# Patient Record
Sex: FEMALE | Race: WHITE | NOT HISPANIC OR LATINO | Employment: PART TIME | URBAN - METROPOLITAN AREA
[De-identification: names, ages, dates, MRNs, and addresses within clinical notes are randomized per-mention and may not be internally consistent; named-entity substitution may affect disease eponyms.]

---

## 2019-11-20 ENCOUNTER — OFFICE VISIT (OUTPATIENT)
Dept: FAMILY MEDICINE CLINIC | Facility: CLINIC | Age: 39
End: 2019-11-20

## 2019-11-20 VITALS
HEIGHT: 66 IN | WEIGHT: 145 LBS | DIASTOLIC BLOOD PRESSURE: 82 MMHG | SYSTOLIC BLOOD PRESSURE: 122 MMHG | BODY MASS INDEX: 23.3 KG/M2 | HEART RATE: 88 BPM | OXYGEN SATURATION: 98 % | TEMPERATURE: 98.1 F

## 2019-11-20 DIAGNOSIS — Z87.19 H/O DIVERTICULITIS OF COLON: ICD-10-CM

## 2019-11-20 DIAGNOSIS — Z79.899 HIGH RISK MEDICATION USE: ICD-10-CM

## 2019-11-20 DIAGNOSIS — F33.42 RECURRENT MAJOR DEPRESSIVE DISORDER, IN FULL REMISSION (HCC): Primary | ICD-10-CM

## 2019-11-20 DIAGNOSIS — S46.911A STRAIN OF RIGHT SHOULDER, INITIAL ENCOUNTER: ICD-10-CM

## 2019-11-20 DIAGNOSIS — K21.9 GASTROESOPHAGEAL REFLUX DISEASE WITHOUT ESOPHAGITIS: ICD-10-CM

## 2019-11-20 DIAGNOSIS — Z23 NEED FOR IMMUNIZATION AGAINST INFLUENZA: ICD-10-CM

## 2019-11-20 DIAGNOSIS — E78.5 DYSLIPIDEMIA: ICD-10-CM

## 2019-11-20 PROBLEM — F32.A DEPRESSION: Status: ACTIVE | Noted: 2019-11-20

## 2019-11-20 LAB
CHOLEST SERPL-MCNC: 216 MG/DL (ref 0–200)
HDLC SERPL-MCNC: 59 MG/DL (ref 40–60)
LDLC SERPL CALC-MCNC: 134 MG/DL (ref 0–100)
TRIGL SERPL-MCNC: 117 MG/DL (ref 0–150)
VIT B12 SERPL-MCNC: 567 PG/ML (ref 211–946)
VLDLC SERPL CALC-MCNC: 23.4 MG/DL

## 2019-11-20 PROCEDURE — 90471 IMMUNIZATION ADMIN: CPT | Performed by: FAMILY MEDICINE

## 2019-11-20 PROCEDURE — 99214 OFFICE O/P EST MOD 30 MIN: CPT | Performed by: FAMILY MEDICINE

## 2019-11-20 PROCEDURE — 90686 IIV4 VACC NO PRSV 0.5 ML IM: CPT | Performed by: FAMILY MEDICINE

## 2019-11-20 RX ORDER — CYCLOBENZAPRINE HCL 10 MG
10 TABLET ORAL 3 TIMES DAILY PRN
Qty: 90 TABLET | Refills: 1 | Status: SHIPPED | OUTPATIENT
Start: 2019-11-20 | End: 2020-03-23 | Stop reason: SDUPTHER

## 2019-11-20 RX ORDER — ESCITALOPRAM OXALATE 20 MG/1
20 TABLET ORAL DAILY
Qty: 90 TABLET | Refills: 3 | Status: SHIPPED | OUTPATIENT
Start: 2019-11-20 | End: 2021-01-08 | Stop reason: SDUPTHER

## 2019-11-20 RX ORDER — HYOSCYAMINE SULFATE 0.125 MG
TABLET,DISINTEGRATING ORAL 3 TIMES DAILY PRN
COMMUNITY
End: 2020-08-16

## 2019-11-20 RX ORDER — BUPROPION HYDROCHLORIDE 300 MG/1
300 TABLET ORAL EVERY MORNING
Qty: 90 TABLET | Refills: 3 | Status: SHIPPED | OUTPATIENT
Start: 2019-11-20 | End: 2020-11-09 | Stop reason: SDUPTHER

## 2019-11-20 RX ORDER — NAPROXEN 500 MG/1
500 TABLET ORAL
COMMUNITY
End: 2020-08-16

## 2019-11-20 NOTE — PROGRESS NOTES
Subjective   Marleny Palma is a 39 y.o. female.     Chief Complaint   Patient presents with   • Pain     back pain and right shoulder pain        Patient here to establish in the new office.  Her old records are not available today.  She is doing well on her Lexapro and Wellbutrin for her depression.  She was initially started on that during the postpartum period.  She tried to go off it at one time and did not do well.  She prefers to stay on it.  She denies any suicidal homicidal ideation.  She also has long-standing reflux that requires daily PPI usage.  She has had a couple episodes of diverticulitis that has not been further evaluated either.  We discussed her seeing a gastrologist for appropriate work-up.  She has a new complaint today of some right medial shoulder pain.  She does a lot of lifting at work and thinks she is overused it.  She has full range of motion.  The pain does wax and wane and naproxen helps a little bit.           The following portions of the patient's history were reviewed and updated as appropriate: allergies, current medications, past family history, past medical history, past social history, past surgical history and problem list.    Past Medical History:   Diagnosis Date   • Abnormal menstrual cycle    • Acid reflux    • Allergic rhinitis    • Depression    • Fatigue    • Headache    • Hiatal hernia    • Hidradenitis axillaris        Past Surgical History:   Procedure Laterality Date   • AXILLARY HIDRADENITIS EXCISION  11/16/2011   • HYSTERECTOMY     • INCISION AND DRAINAGE ABSCESS  09/04/2013    axilla   • TONSILLECTOMY         Family History   Problem Relation Age of Onset   • Colon polyps Father    • Hypertension Father    • Heart defect Other    • Stroke Other    • Diabetes Other    • Hypertension Other        Social History     Socioeconomic History   • Marital status:      Spouse name: Not on file   • Number of children: Not on file   • Years of education: Not on file   •  "Highest education level: Not on file   Tobacco Use   • Smoking status: Never Smoker   • Smokeless tobacco: Never Used   Substance and Sexual Activity   • Alcohol use: No         Current Outpatient Medications:   •  acetaminophen (TYLENOL) 325 MG tablet, Take 650 mg by mouth., Disp: , Rfl:   •  buPROPion XL (WELLBUTRIN XL) 300 MG 24 hr tablet, Take 1 tablet by mouth Every Morning., Disp: 90 tablet, Rfl: 3  •  cyclobenzaprine (FLEXERIL) 10 MG tablet, Take 1 tablet by mouth 3 (Three) Times a Day As Needed for Muscle Spasms., Disp: 90 tablet, Rfl: 1  •  escitalopram (LEXAPRO) 20 MG tablet, Take 1 tablet by mouth Daily., Disp: 90 tablet, Rfl: 3  •  hyoscyamine sulfate (ANASPAZ) 0.125 MG tablet dispersible disintegrating tablet, Take  by mouth 3 (Three) Times a Day As Needed., Disp: , Rfl:   •  naproxen (NAPROSYN) 500 MG tablet, Take 500 mg by mouth., Disp: , Rfl:   •  pantoprazole (PROTONIX) 40 MG EC tablet, Take 40 mg by mouth daily., Disp: , Rfl:     Review of Systems   Constitutional: Negative for chills, fatigue and fever.   HENT: Negative for congestion, rhinorrhea and sore throat.    Respiratory: Negative for cough and shortness of breath.    Cardiovascular: Negative for chest pain and leg swelling.   Gastrointestinal: Negative for abdominal pain.   Endocrine: Negative for polydipsia and polyuria.   Genitourinary: Negative for dysuria.   Musculoskeletal: Positive for arthralgias. Negative for myalgias.   Skin: Negative for rash.   Neurological: Negative for dizziness and numbness.   Hematological: Does not bruise/bleed easily.   Psychiatric/Behavioral: Negative for sleep disturbance.       Objective   Vitals:    11/20/19 0840   BP: 122/82   Pulse: 88   Temp: 98.1 °F (36.7 °C)   SpO2: 98%   Weight: 65.8 kg (145 lb)   Height: 167.6 cm (66\")     Body mass index is 23.4 kg/m².  Physical Exam   Constitutional: She is oriented to person, place, and time. She appears well-developed and well-nourished.   HENT:   Head: " Normocephalic and atraumatic.   Eyes: Conjunctivae and EOM are normal. Pupils are equal, round, and reactive to light.   Neck: Neck supple. No thyromegaly present.   Cardiovascular: Normal rate and regular rhythm.   No murmur heard.  Pulmonary/Chest: Effort normal and breath sounds normal. She has no wheezes.   Abdominal: Soft.   Musculoskeletal: Normal range of motion.   Tender to palpation medial right trapezius muscle down into the latissimus muscle.  There with good strength and normal range of motion.   Neurological: She is alert and oriented to person, place, and time. No cranial nerve deficit.   Skin: Skin is warm and dry.   Psychiatric: She has a normal mood and affect.         Assessment/Plan   Marleny was seen today for pain.    Diagnoses and all orders for this visit:    Recurrent major depressive disorder, in full remission (CMS/[facility])  -     buPROPion XL (WELLBUTRIN XL) 300 MG 24 hr tablet; Take 1 tablet by mouth Every Morning.  -     escitalopram (LEXAPRO) 20 MG tablet; Take 1 tablet by mouth Daily.    Gastroesophageal reflux disease without esophagitis  -     Ambulatory Referral to Gastroenterology    H/O diverticulitis of colon  -     Ambulatory Referral to Gastroenterology    Strain of right shoulder, initial encounter  -     cyclobenzaprine (FLEXERIL) 10 MG tablet; Take 1 tablet by mouth 3 (Three) Times a Day As Needed for Muscle Spasms.    Dyslipidemia  -     Lipid Panel    High risk medication use  -     Vitamin B12    Need for immunization against influenza  -     Fluarix/Fluzone/Afluria Quad>6 Months               Patient Instructions   Try some topicals to right shoulder.  Let me know if not better soon and can get you set up with physical therapy.       Shoulder Sprain    A shoulder sprain is a partial or complete tear in one of the tough, fiber-like tissues (ligaments) in the shoulder. The ligaments in the shoulder help to hold the shoulder in place.  What are the causes?  This condition may be  caused by:  · A fall.  · A hit to the shoulder.  · A twist of the arm.  What increases the risk?  You are more likely to develop this condition if you:  · Play sports.  · Have problems with balance or coordination.  What are the signs or symptoms?  Symptoms of this condition include:  · Pain when moving the shoulder.  · Limited ability to move the shoulder.  · Swelling and tenderness on top of the shoulder.  · Warmth in the shoulder.  · A change in the shape of the shoulder.  · Redness or bruising on the shoulder.  How is this diagnosed?  This condition is diagnosed with:  · A physical exam. During the exam, you may be asked to do simple exercises with your shoulder.  · Imaging tests such as X-rays, MRI, or a CT scan. These tests can show how severe the sprain is.  How is this treated?  This condition may be treated with:  · Rest.  · Pain medicine.  · Ice.  · A sling or brace. This is used to keep the arm still while the shoulder is healing.  · Physical therapy or rehabilitation exercises. These help to improve the range of motion and strength of the shoulder.  · Surgery (rare). Surgery may be needed if the sprain caused a joint to become unstable. Surgery may also be needed to reduce pain.  Some people may develop ongoing shoulder pain or lose some range of motion in the shoulder. However, most people do not develop long-term problems.  Follow these instructions at home:    If you have a sling or brace:  · Wear the sling or brace as told by your health care provider. Remove it only as told by your health care provider.  · Loosen the sling or brace if your fingers tingle, become numb, or turn cold and blue.  · Keep the sling or brace clean.  · If the sling or brace is not waterproof:  ? Do not let it get wet.  ? Cover it with a watertight covering when you take a bath or shower.  Activity  · Rest your shoulder.  · Move your arm only as much as told by your health care provider, but move your hand and fingers often  to prevent stiffness and swelling.  · Return to your normal activities as told by your health care provider. Ask your health care provider what activities are safe for you.  · Ask your health care provider when it is safe for you to drive if you have a sling or brace on your shoulder.  · If you were shown how to do any exercises, do them as told by your health care provider.  General instructions  · If directed, put ice on the affected area.  ? Put ice in a plastic bag.  ? Place a towel between your skin and the bag.  ? Leave the ice on for 20 minutes, 2-3 times a day.  · Take over-the-counter and prescription medicines only as told by your health care provider.  · Do not use any products that contain nicotine or tobacco, such as cigarettes, e-cigarettes, and chewing tobacco. These can delay healing. If you need help quitting, ask your health care provider.  · Keep all follow-up visits as told by your health care provider. This is important.  Contact a health care provider if:  · Your pain gets worse.  · Your pain is not relieved with medicines.  · You have increased redness or swelling.  Get help right away if:  · You have a fever.  · You cannot move your arm or shoulder.  · You develop severe numbness or tingling in your arm, hand, or fingers.  · Your arm, hand, or fingers feel cold and turn blue, white, or gray.  Summary  · A shoulder sprain is a partial or complete tear in one of the tough, fiber-like tissues (ligaments) in the shoulder.  · This condition may be caused by a fall, a hit to the shoulder, or a twist of the arm.  · Treatment usually includes rest, ice, and pain medicine as needed.  · If you have a sling or brace, wear it as told by your health care provider. Remove it only as told by your health care provider.  This information is not intended to replace advice given to you by your health care provider. Make sure you discuss any questions you have with your health care provider.  Document Released:  05/06/2010 Document Revised: 05/24/2019 Document Reviewed: 05/24/2019  [employer] Patient Education © 2019 [employer]      Shoulder Exercises  Ask your health care provider which exercises are safe for you. Do exercises exactly as told by your health care provider and adjust them as directed. It is normal to feel mild stretching, pulling, tightness, or discomfort as you do these exercises, but you should stop right away if you feel sudden pain or your pain gets worse. Do not begin these exercises until told by your health care provider.  Range of Motion Exercises              These exercises warm up your muscles and joints and improve the movement and flexibility of your shoulder. These exercises also help to relieve pain, numbness, and tingling. These exercises involve stretching your injured shoulder directly.  Exercise A: Pendulum  1. Stand near a wall or a surface that you can hold onto for balance.  2. Bend at the waist and let your left / right arm hang straight down. Use your other arm to support you. Keep your back straight and do not lock your knees.  3. Relax your left / right arm and shoulder muscles, and move your hips and your trunk so your left / right arm swings freely. Your arm should swing because of the motion of your body, not because you are using your arm or shoulder muscles.  4. Keep moving your body so your arm swings in the following directions, as told by your health care provider:  ? Side to side.  ? Forward and backward.  ? In clockwise and counterclockwise circles.  5. Continue each motion for __________ seconds, or for as long as told by your health care provider.  6. Slowly return to the starting position.  Repeat __________ times. Complete this exercise __________ times a day.  Exercise B: Flexion, Standing  1. Stand and hold a broomstick, a cane, or a similar object. Place your hands a little more than shoulder-width apart on the object. Your left / right hand should be  palm-up, and your other hand should be palm-down.  2. Keep your elbow straight and keep your shoulder muscles relaxed. Push the stick down with your healthy arm to raise your left / right arm in front of your body, and then over your head until you feel a stretch in your shoulder.  ? Avoid shrugging your shoulder while you raise your arm. Keep your shoulder blade tucked down toward the middle of your back.  3. Hold for __________ seconds.  4. Slowly return to the starting position.  Repeat __________ times. Complete this exercise __________ times a day.  Exercise C: Abduction, Standing  1. Stand and hold a broomstick, a cane, or a similar object. Place your hands a little more than shoulder-width apart on the object. Your left / right hand should be palm-up, and your other hand should be palm-down.  2. While keeping your elbow straight and your shoulder muscles relaxed, push the stick across your body toward your left / right side. Raise your left / right arm to the side of your body and then over your head until you feel a stretch in your shoulder.  ? Do not raise your arm above shoulder height, unless your health care provider tells you to do that.  ? Avoid shrugging your shoulder while you raise your arm. Keep your shoulder blade tucked down toward the middle of your back.  3. Hold for __________ seconds.  4. Slowly return to the starting position.  Repeat __________ times. Complete this exercise __________ times a day.  Exercise D: Internal Rotation  1. Place your left / right hand behind your back, palm-up.  2. Use your other hand to dangle an exercise band, a towel, or a similar object over your shoulder. Grasp the band with your left / right hand so you are holding onto both ends.  3. Gently pull up on the band until you feel a stretch in the front of your left / right shoulder.  ? Avoid shrugging your shoulder while you raise your arm. Keep your shoulder blade tucked down toward the middle of your  back.  4. Hold for __________ seconds.  5. Release the stretch by letting go of the band and lowering your hands.  Repeat __________ times. Complete this exercise __________ times a day.  Stretching Exercises    These exercises warm up your muscles and joints and improve the movement and flexibility of your shoulder. These exercises also help to relieve pain, numbness, and tingling. These exercises are done using your healthy shoulder to help stretch the muscles of your injured shoulder.  Exercise E: Corner Stretch (External Rotation and Abduction)  1.  a doorway with one of your feet slightly in front of the other. This is called a staggered stance. If you cannot reach your forearms to the door frame, stand facing a corner of a room.  2. Choose one of the following positions as told by your health care provider:  ? Place your hands and forearms on the door frame above your head.  ? Place your hands and forearms on the door frame at the height of your head.  ? Place your hands on the door frame at the height of your elbows.  3. Slowly move your weight onto your front foot until you feel a stretch across your chest and in the front of your shoulders. Keep your head and chest upright and keep your abdominal muscles tight.  4. Hold for __________ seconds.  5. To release the stretch, shift your weight to your back foot.  Repeat __________ times. Complete this stretch __________ times a day.  Exercise F: Extension, Standing  1. Stand and hold a broomstick, a cane, or a similar object behind your back.  ? Your hands should be a little wider than shoulder-width apart.  ? Your palms should face away from your back.  2. Keeping your elbows straight and keeping your shoulder muscles relaxed, move the stick away from your body until you feel a stretch in your shoulder.  ? Avoid shrugging your shoulders while you move the stick. Keep your shoulder blade tucked down toward the middle of your back.  3. Hold for __________  seconds.  4. Slowly return to the starting position.  Repeat __________ times. Complete this exercise __________ times a day.  Strengthening Exercises                   These exercises build strength and endurance in your shoulder. Endurance is the ability to use your muscles for a long time, even after they get tired.  Exercise G: External Rotation  1. Sit in a stable chair without armrests.  2. Secure an exercise band at elbow height on your left / right side.  3. Place a soft object, such as a folded towel or a small pillow, between your left / right upper arm and your body to move your elbow a few inches away (about 10 cm) from your side.  4. Hold the end of the band so it is tight and there is no slack.  5. Keeping your elbow pressed against the soft object, move your left / right forearm out, away from your abdomen. Keep your body steady so only your forearm moves.  6. Hold for __________ seconds.  7. Slowly return to the starting position.  Repeat __________ times. Complete this exercise __________ times a day.  Exercise H: Shoulder Abduction  1. Sit in a stable chair without armrests, or stand.  2. Hold a __________ weight in your left / right hand, or hold an exercise band with both hands.  3. Start with your arms straight down and your left / right palm facing in, toward your body.  4. Slowly lift your left / right hand out to your side. Do not lift your hand above shoulder height unless your health care provider tells you that this is safe.  ? Keep your arms straight.  ? Avoid shrugging your shoulder while you do this movement. Keep your shoulder blade tucked down toward the middle of your back.  5. Hold for __________ seconds.  6. Slowly lower your arm, and return to the starting position.  Repeat __________ times. Complete this exercise __________ times a day.  Exercise I: Shoulder Extension  1. Sit in a stable chair without armrests, or stand.  2. Secure an exercise band to a stable object in front of  "you where it is at shoulder height.  3. Hold one end of the exercise band in each hand. Your palms should face each other.  4. Straighten your elbows and lift your hands up to shoulder height.  5. Step back, away from the secured end of the exercise band, until the band is tight and there is no slack.  6. Squeeze your shoulder blades together as you pull your hands down to the sides of your thighs. Stop when your hands are straight down by your sides. Do not let your hands go behind your body.  7. Hold for __________ seconds.  8. Slowly return to the starting position.  Repeat __________ times. Complete this exercise __________ times a day.  Exercise J: Standing Shoulder Row  1. Sit in a stable chair without armrests, or stand.  2. Secure an exercise band to a stable object in front of you so it is at waist height.  3. Hold one end of the exercise band in each hand. Your palms should be in a thumbs-up position.  4. Bend each of your elbows to an \"L\" shape (about 90 degrees) and keep your upper arms at your sides.  5. Step back until the band is tight and there is no slack.  6. Slowly pull your elbows back behind you.  7. Hold for __________ seconds.  8. Slowly return to the starting position.  Repeat __________ times. Complete this exercise __________ times a day.  Exercise K: Shoulder Press-Ups  1. Sit in a stable chair that has armrests. Sit upright, with your feet flat on the floor.  2. Put your hands on the armrests so your elbows are bent and your fingers are pointing forward. Your hands should be about even with the sides of your body.  3. Push down on the armrests and use your arms to lift yourself off of the chair. Straighten your elbows and lift yourself up as much as you comfortably can.  ? Move your shoulder blades down, and avoid letting your shoulders move up toward your ears.  ? Keep your feet on the ground. As you get stronger, your feet should support less of your body weight as you lift yourself " up.  4. Hold for __________ seconds.  5. Slowly lower yourself back into the chair.  Repeat __________ times. Complete this exercise __________ times a day.  Exercise L: Wall Push-Ups  1. Stand so you are facing a stable wall. Your feet should be about one arm-length away from the wall.  2. Lean forward and place your palms on the wall at shoulder height.  3. Keep your feet flat on the floor as you bend your elbows and lean forward toward the wall.  4. Hold for __________ seconds.  5. Straighten your elbows to push yourself back to the starting position.  Repeat __________ times. Complete this exercise __________ times a day.  This information is not intended to replace advice given to you by your health care provider. Make sure you discuss any questions you have with your health care provider.  Document Released: 11/01/2006 Document Revised: 04/23/2019 Document Reviewed: 08/28/2016  [employer] Interactive Patient Education © 2019 [employer]

## 2019-11-20 NOTE — PATIENT INSTRUCTIONS
Try some topicals to right shoulder.  Let me know if not better soon and can get you set up with physical therapy.       Shoulder Sprain    A shoulder sprain is a partial or complete tear in one of the tough, fiber-like tissues (ligaments) in the shoulder. The ligaments in the shoulder help to hold the shoulder in place.  What are the causes?  This condition may be caused by:  · A fall.  · A hit to the shoulder.  · A twist of the arm.  What increases the risk?  You are more likely to develop this condition if you:  · Play sports.  · Have problems with balance or coordination.  What are the signs or symptoms?  Symptoms of this condition include:  · Pain when moving the shoulder.  · Limited ability to move the shoulder.  · Swelling and tenderness on top of the shoulder.  · Warmth in the shoulder.  · A change in the shape of the shoulder.  · Redness or bruising on the shoulder.  How is this diagnosed?  This condition is diagnosed with:  · A physical exam. During the exam, you may be asked to do simple exercises with your shoulder.  · Imaging tests such as X-rays, MRI, or a CT scan. These tests can show how severe the sprain is.  How is this treated?  This condition may be treated with:  · Rest.  · Pain medicine.  · Ice.  · A sling or brace. This is used to keep the arm still while the shoulder is healing.  · Physical therapy or rehabilitation exercises. These help to improve the range of motion and strength of the shoulder.  · Surgery (rare). Surgery may be needed if the sprain caused a joint to become unstable. Surgery may also be needed to reduce pain.  Some people may develop ongoing shoulder pain or lose some range of motion in the shoulder. However, most people do not develop long-term problems.  Follow these instructions at home:    If you have a sling or brace:  · Wear the sling or brace as told by your health care provider. Remove it only as told by your health care provider.  · Loosen the sling or brace if  your fingers tingle, become numb, or turn cold and blue.  · Keep the sling or brace clean.  · If the sling or brace is not waterproof:  ? Do not let it get wet.  ? Cover it with a watertight covering when you take a bath or shower.  Activity  · Rest your shoulder.  · Move your arm only as much as told by your health care provider, but move your hand and fingers often to prevent stiffness and swelling.  · Return to your normal activities as told by your health care provider. Ask your health care provider what activities are safe for you.  · Ask your health care provider when it is safe for you to drive if you have a sling or brace on your shoulder.  · If you were shown how to do any exercises, do them as told by your health care provider.  General instructions  · If directed, put ice on the affected area.  ? Put ice in a plastic bag.  ? Place a towel between your skin and the bag.  ? Leave the ice on for 20 minutes, 2-3 times a day.  · Take over-the-counter and prescription medicines only as told by your health care provider.  · Do not use any products that contain nicotine or tobacco, such as cigarettes, e-cigarettes, and chewing tobacco. These can delay healing. If you need help quitting, ask your health care provider.  · Keep all follow-up visits as told by your health care provider. This is important.  Contact a health care provider if:  · Your pain gets worse.  · Your pain is not relieved with medicines.  · You have increased redness or swelling.  Get help right away if:  · You have a fever.  · You cannot move your arm or shoulder.  · You develop severe numbness or tingling in your arm, hand, or fingers.  · Your arm, hand, or fingers feel cold and turn blue, white, or gray.  Summary  · A shoulder sprain is a partial or complete tear in one of the tough, fiber-like tissues (ligaments) in the shoulder.  · This condition may be caused by a fall, a hit to the shoulder, or a twist of the arm.  · Treatment usually  includes rest, ice, and pain medicine as needed.  · If you have a sling or brace, wear it as told by your health care provider. Remove it only as told by your health care provider.  This information is not intended to replace advice given to you by your health care provider. Make sure you discuss any questions you have with your health care provider.  Document Released: 05/06/2010 Document Revised: 05/24/2019 Document Reviewed: 05/24/2019  "[employer]" Interactive Patient Education © 2019 "[employer] Inc[employer]      Shoulder Exercises  Ask your health care provider which exercises are safe for you. Do exercises exactly as told by your health care provider and adjust them as directed. It is normal to feel mild stretching, pulling, tightness, or discomfort as you do these exercises, but you should stop right away if you feel sudden pain or your pain gets worse. Do not begin these exercises until told by your health care provider.  Range of Motion Exercises              These exercises warm up your muscles and joints and improve the movement and flexibility of your shoulder. These exercises also help to relieve pain, numbness, and tingling. These exercises involve stretching your injured shoulder directly.  Exercise A: Pendulum  1. Stand near a wall or a surface that you can hold onto for balance.  2. Bend at the waist and let your left / right arm hang straight down. Use your other arm to support you. Keep your back straight and do not lock your knees.  3. Relax your left / right arm and shoulder muscles, and move your hips and your trunk so your left / right arm swings freely. Your arm should swing because of the motion of your body, not because you are using your arm or shoulder muscles.  4. Keep moving your body so your arm swings in the following directions, as told by your health care provider:  ? Side to side.  ? Forward and backward.  ? In clockwise and counterclockwise circles.  5. Continue each motion for __________  seconds, or for as long as told by your health care provider.  6. Slowly return to the starting position.  Repeat __________ times. Complete this exercise __________ times a day.  Exercise B: Flexion, Standing  1. Stand and hold a broomstick, a cane, or a similar object. Place your hands a little more than shoulder-width apart on the object. Your left / right hand should be palm-up, and your other hand should be palm-down.  2. Keep your elbow straight and keep your shoulder muscles relaxed. Push the stick down with your healthy arm to raise your left / right arm in front of your body, and then over your head until you feel a stretch in your shoulder.  ? Avoid shrugging your shoulder while you raise your arm. Keep your shoulder blade tucked down toward the middle of your back.  3. Hold for __________ seconds.  4. Slowly return to the starting position.  Repeat __________ times. Complete this exercise __________ times a day.  Exercise C: Abduction, Standing  1. Stand and hold a broomstick, a cane, or a similar object. Place your hands a little more than shoulder-width apart on the object. Your left / right hand should be palm-up, and your other hand should be palm-down.  2. While keeping your elbow straight and your shoulder muscles relaxed, push the stick across your body toward your left / right side. Raise your left / right arm to the side of your body and then over your head until you feel a stretch in your shoulder.  ? Do not raise your arm above shoulder height, unless your health care provider tells you to do that.  ? Avoid shrugging your shoulder while you raise your arm. Keep your shoulder blade tucked down toward the middle of your back.  3. Hold for __________ seconds.  4. Slowly return to the starting position.  Repeat __________ times. Complete this exercise __________ times a day.  Exercise D: Internal Rotation  1. Place your left / right hand behind your back, palm-up.  2. Use your other hand to dangle  an exercise band, a towel, or a similar object over your shoulder. Grasp the band with your left / right hand so you are holding onto both ends.  3. Gently pull up on the band until you feel a stretch in the front of your left / right shoulder.  ? Avoid shrugging your shoulder while you raise your arm. Keep your shoulder blade tucked down toward the middle of your back.  4. Hold for __________ seconds.  5. Release the stretch by letting go of the band and lowering your hands.  Repeat __________ times. Complete this exercise __________ times a day.  Stretching Exercises    These exercises warm up your muscles and joints and improve the movement and flexibility of your shoulder. These exercises also help to relieve pain, numbness, and tingling. These exercises are done using your healthy shoulder to help stretch the muscles of your injured shoulder.  Exercise E: Corner Stretch (External Rotation and Abduction)  1.  a doorway with one of your feet slightly in front of the other. This is called a staggered stance. If you cannot reach your forearms to the door frame, stand facing a corner of a room.  2. Choose one of the following positions as told by your health care provider:  ? Place your hands and forearms on the door frame above your head.  ? Place your hands and forearms on the door frame at the height of your head.  ? Place your hands on the door frame at the height of your elbows.  3. Slowly move your weight onto your front foot until you feel a stretch across your chest and in the front of your shoulders. Keep your head and chest upright and keep your abdominal muscles tight.  4. Hold for __________ seconds.  5. To release the stretch, shift your weight to your back foot.  Repeat __________ times. Complete this stretch __________ times a day.  Exercise F: Extension, Standing  1. Stand and hold a broomstick, a cane, or a similar object behind your back.  ? Your hands should be a little wider than  shoulder-width apart.  ? Your palms should face away from your back.  2. Keeping your elbows straight and keeping your shoulder muscles relaxed, move the stick away from your body until you feel a stretch in your shoulder.  ? Avoid shrugging your shoulders while you move the stick. Keep your shoulder blade tucked down toward the middle of your back.  3. Hold for __________ seconds.  4. Slowly return to the starting position.  Repeat __________ times. Complete this exercise __________ times a day.  Strengthening Exercises                   These exercises build strength and endurance in your shoulder. Endurance is the ability to use your muscles for a long time, even after they get tired.  Exercise G: External Rotation  1. Sit in a stable chair without armrests.  2. Secure an exercise band at elbow height on your left / right side.  3. Place a soft object, such as a folded towel or a small pillow, between your left / right upper arm and your body to move your elbow a few inches away (about 10 cm) from your side.  4. Hold the end of the band so it is tight and there is no slack.  5. Keeping your elbow pressed against the soft object, move your left / right forearm out, away from your abdomen. Keep your body steady so only your forearm moves.  6. Hold for __________ seconds.  7. Slowly return to the starting position.  Repeat __________ times. Complete this exercise __________ times a day.  Exercise H: Shoulder Abduction  1. Sit in a stable chair without armrests, or stand.  2. Hold a __________ weight in your left / right hand, or hold an exercise band with both hands.  3. Start with your arms straight down and your left / right palm facing in, toward your body.  4. Slowly lift your left / right hand out to your side. Do not lift your hand above shoulder height unless your health care provider tells you that this is safe.  ? Keep your arms straight.  ? Avoid shrugging your shoulder while you do this movement. Keep  "your shoulder blade tucked down toward the middle of your back.  5. Hold for __________ seconds.  6. Slowly lower your arm, and return to the starting position.  Repeat __________ times. Complete this exercise __________ times a day.  Exercise I: Shoulder Extension  1. Sit in a stable chair without armrests, or stand.  2. Secure an exercise band to a stable object in front of you where it is at shoulder height.  3. Hold one end of the exercise band in each hand. Your palms should face each other.  4. Straighten your elbows and lift your hands up to shoulder height.  5. Step back, away from the secured end of the exercise band, until the band is tight and there is no slack.  6. Squeeze your shoulder blades together as you pull your hands down to the sides of your thighs. Stop when your hands are straight down by your sides. Do not let your hands go behind your body.  7. Hold for __________ seconds.  8. Slowly return to the starting position.  Repeat __________ times. Complete this exercise __________ times a day.  Exercise J: Standing Shoulder Row  1. Sit in a stable chair without armrests, or stand.  2. Secure an exercise band to a stable object in front of you so it is at waist height.  3. Hold one end of the exercise band in each hand. Your palms should be in a thumbs-up position.  4. Bend each of your elbows to an \"L\" shape (about 90 degrees) and keep your upper arms at your sides.  5. Step back until the band is tight and there is no slack.  6. Slowly pull your elbows back behind you.  7. Hold for __________ seconds.  8. Slowly return to the starting position.  Repeat __________ times. Complete this exercise __________ times a day.  Exercise K: Shoulder Press-Ups  1. Sit in a stable chair that has armrests. Sit upright, with your feet flat on the floor.  2. Put your hands on the armrests so your elbows are bent and your fingers are pointing forward. Your hands should be about even with the sides of your " body.  3. Push down on the armrests and use your arms to lift yourself off of the chair. Straighten your elbows and lift yourself up as much as you comfortably can.  ? Move your shoulder blades down, and avoid letting your shoulders move up toward your ears.  ? Keep your feet on the ground. As you get stronger, your feet should support less of your body weight as you lift yourself up.  4. Hold for __________ seconds.  5. Slowly lower yourself back into the chair.  Repeat __________ times. Complete this exercise __________ times a day.  Exercise L: Wall Push-Ups  1. Stand so you are facing a stable wall. Your feet should be about one arm-length away from the wall.  2. Lean forward and place your palms on the wall at shoulder height.  3. Keep your feet flat on the floor as you bend your elbows and lean forward toward the wall.  4. Hold for __________ seconds.  5. Straighten your elbows to push yourself back to the starting position.  Repeat __________ times. Complete this exercise __________ times a day.  This information is not intended to replace advice given to you by your health care provider. Make sure you discuss any questions you have with your health care provider.  Document Released: 11/01/2006 Document Revised: 04/23/2019 Document Reviewed: 08/28/2016  [employer] Interactive Patient Education © 2019 [employer]

## 2020-01-06 RX ORDER — PANTOPRAZOLE SODIUM 40 MG/1
40 TABLET, DELAYED RELEASE ORAL DAILY
Qty: 90 TABLET | Refills: 0 | Status: SHIPPED | OUTPATIENT
Start: 2020-01-06 | End: 2020-05-05

## 2020-03-23 DIAGNOSIS — S46.911A STRAIN OF RIGHT SHOULDER, INITIAL ENCOUNTER: ICD-10-CM

## 2020-03-23 RX ORDER — CYCLOBENZAPRINE HCL 10 MG
10 TABLET ORAL 3 TIMES DAILY PRN
Qty: 90 TABLET | Refills: 2 | Status: SHIPPED | OUTPATIENT
Start: 2020-03-23 | End: 2020-09-22 | Stop reason: SDUPTHER

## 2020-05-05 RX ORDER — PANTOPRAZOLE SODIUM 40 MG/1
40 TABLET, DELAYED RELEASE ORAL DAILY
Qty: 90 TABLET | Refills: 0 | Status: SHIPPED | OUTPATIENT
Start: 2020-05-05 | End: 2020-08-17

## 2020-08-17 RX ORDER — PANTOPRAZOLE SODIUM 40 MG/1
40 TABLET, DELAYED RELEASE ORAL DAILY
Qty: 90 TABLET | Refills: 0 | Status: SHIPPED | OUTPATIENT
Start: 2020-08-17 | End: 2020-11-09

## 2020-09-22 DIAGNOSIS — S46.911A STRAIN OF RIGHT SHOULDER, INITIAL ENCOUNTER: ICD-10-CM

## 2020-09-23 RX ORDER — CYCLOBENZAPRINE HCL 10 MG
TABLET ORAL
Qty: 90 TABLET | Refills: 2 | OUTPATIENT
Start: 2020-09-23

## 2020-09-23 RX ORDER — CYCLOBENZAPRINE HCL 10 MG
10 TABLET ORAL 3 TIMES DAILY PRN
Qty: 90 TABLET | Refills: 2 | Status: SHIPPED | OUTPATIENT
Start: 2020-09-23 | End: 2021-03-09

## 2020-11-08 DIAGNOSIS — F33.42 RECURRENT MAJOR DEPRESSIVE DISORDER, IN FULL REMISSION (HCC): ICD-10-CM

## 2020-11-09 DIAGNOSIS — F33.42 RECURRENT MAJOR DEPRESSIVE DISORDER, IN FULL REMISSION (HCC): ICD-10-CM

## 2020-11-09 RX ORDER — PANTOPRAZOLE SODIUM 40 MG/1
40 TABLET, DELAYED RELEASE ORAL DAILY
Qty: 90 TABLET | Refills: 0 | Status: SHIPPED | OUTPATIENT
Start: 2020-11-09 | End: 2021-02-03

## 2020-11-09 RX ORDER — BUPROPION HYDROCHLORIDE 300 MG/1
300 TABLET ORAL EVERY MORNING
Qty: 30 TABLET | Refills: 0 | Status: SHIPPED | OUTPATIENT
Start: 2020-11-09 | End: 2020-12-10

## 2020-11-09 RX ORDER — BUPROPION HYDROCHLORIDE 300 MG/1
300 TABLET ORAL EVERY MORNING
Qty: 90 TABLET | Refills: 3 | OUTPATIENT
Start: 2020-11-09

## 2020-11-09 RX ORDER — PANTOPRAZOLE SODIUM 40 MG/1
40 TABLET, DELAYED RELEASE ORAL DAILY
Qty: 90 TABLET | Refills: 0 | OUTPATIENT
Start: 2020-11-09

## 2020-12-10 DIAGNOSIS — F33.42 RECURRENT MAJOR DEPRESSIVE DISORDER, IN FULL REMISSION (HCC): ICD-10-CM

## 2020-12-10 RX ORDER — BUPROPION HYDROCHLORIDE 300 MG/1
300 TABLET ORAL EVERY MORNING
Qty: 90 TABLET | Refills: 0 | Status: SHIPPED | OUTPATIENT
Start: 2020-12-10 | End: 2021-01-08 | Stop reason: SDUPTHER

## 2021-01-07 DIAGNOSIS — F33.42 RECURRENT MAJOR DEPRESSIVE DISORDER, IN FULL REMISSION (HCC): ICD-10-CM

## 2021-01-07 RX ORDER — ESCITALOPRAM OXALATE 20 MG/1
20 TABLET ORAL DAILY
Qty: 90 TABLET | Refills: 3 | OUTPATIENT
Start: 2021-01-07

## 2021-01-08 DIAGNOSIS — F33.42 RECURRENT MAJOR DEPRESSIVE DISORDER, IN FULL REMISSION (HCC): ICD-10-CM

## 2021-01-08 RX ORDER — BUPROPION HYDROCHLORIDE 300 MG/1
300 TABLET ORAL EVERY MORNING
Qty: 90 TABLET | Refills: 0 | OUTPATIENT
Start: 2021-01-08

## 2021-01-08 RX ORDER — ESCITALOPRAM OXALATE 20 MG/1
20 TABLET ORAL DAILY
Qty: 90 TABLET | Refills: 3 | OUTPATIENT
Start: 2021-01-08

## 2021-01-08 RX ORDER — ESCITALOPRAM OXALATE 20 MG/1
20 TABLET ORAL DAILY
Qty: 30 TABLET | Refills: 0 | OUTPATIENT
Start: 2021-01-08

## 2021-01-08 RX ORDER — BUPROPION HYDROCHLORIDE 300 MG/1
300 TABLET ORAL EVERY MORNING
Qty: 30 TABLET | Refills: 0 | Status: SHIPPED | OUTPATIENT
Start: 2021-01-08 | End: 2021-02-03 | Stop reason: SDUPTHER

## 2021-01-08 RX ORDER — ESCITALOPRAM OXALATE 20 MG/1
20 TABLET ORAL DAILY
Qty: 30 TABLET | Refills: 0 | Status: SHIPPED | OUTPATIENT
Start: 2021-01-08 | End: 2021-02-03 | Stop reason: SDUPTHER

## 2021-01-08 NOTE — TELEPHONE ENCOUNTER
Caller: Louie Marleny    Relationship: Self    Best call back number:     Medication needed:   Requested Prescriptions     Pending Prescriptions Disp Refills   • buPROPion XL (WELLBUTRIN XL) 300 MG 24 hr tablet 90 tablet 0     Sig: Take 1 tablet by mouth Every Morning.   • escitalopram (LEXAPRO) 20 MG tablet 90 tablet 3     Sig: Take 1 tablet by mouth Daily.       What details did the patient provide when requesting the medication: PT IS SCHEDULED 02/03/2020, SHE IS REQUESTING FOR THESE REFILLS TO HOLD HER OVER TILL THEN. SHE WILL BE OUT SOON.   Does the patient have less than a 3 day supply:  [x] Yes  [] No    What is the patient's preferred pharmacy: [id number] - [facility] [address] - [phone]  - [phone] FX

## 2021-02-03 ENCOUNTER — OFFICE VISIT (OUTPATIENT)
Dept: FAMILY MEDICINE CLINIC | Facility: CLINIC | Age: 41
End: 2021-02-03

## 2021-02-03 VITALS
HEIGHT: 66 IN | BODY MASS INDEX: 23.88 KG/M2 | OXYGEN SATURATION: 99 % | DIASTOLIC BLOOD PRESSURE: 68 MMHG | HEART RATE: 98 BPM | WEIGHT: 148.6 LBS | SYSTOLIC BLOOD PRESSURE: 126 MMHG | TEMPERATURE: 97.7 F

## 2021-02-03 DIAGNOSIS — F33.42 RECURRENT MAJOR DEPRESSIVE DISORDER, IN FULL REMISSION (HCC): Primary | Chronic | ICD-10-CM

## 2021-02-03 DIAGNOSIS — K21.9 GASTROESOPHAGEAL REFLUX DISEASE WITHOUT ESOPHAGITIS: Chronic | ICD-10-CM

## 2021-02-03 DIAGNOSIS — K57.92 DIVERTICULITIS: Chronic | ICD-10-CM

## 2021-02-03 DIAGNOSIS — E78.5 HYPERLIPIDEMIA, UNSPECIFIED HYPERLIPIDEMIA TYPE: ICD-10-CM

## 2021-02-03 PROCEDURE — 99213 OFFICE O/P EST LOW 20 MIN: CPT | Performed by: FAMILY MEDICINE

## 2021-02-03 RX ORDER — ESCITALOPRAM OXALATE 20 MG/1
20 TABLET ORAL DAILY
Qty: 90 TABLET | Refills: 1 | Status: SHIPPED | OUTPATIENT
Start: 2021-02-03 | End: 2021-08-11

## 2021-02-03 RX ORDER — PANTOPRAZOLE SODIUM 40 MG/1
40 TABLET, DELAYED RELEASE ORAL DAILY
Qty: 90 TABLET | Refills: 0 | Status: SHIPPED | OUTPATIENT
Start: 2021-02-03 | End: 2021-11-11

## 2021-02-03 RX ORDER — SACCHAROMYCES BOULARDII 250 MG
250 CAPSULE ORAL 2 TIMES DAILY
COMMUNITY
End: 2021-11-19

## 2021-02-03 RX ORDER — BUPROPION HYDROCHLORIDE 300 MG/1
300 TABLET ORAL EVERY MORNING
Qty: 90 TABLET | Refills: 1 | Status: SHIPPED | OUTPATIENT
Start: 2021-02-03 | End: 2021-08-11 | Stop reason: DRUGHIGH

## 2021-02-03 NOTE — PROGRESS NOTES
"Chief Complaint  Med Refill    Subjective          Marleny Palma presents to [facility] for   Patient presents to follow-up on her depression.  Is been over a year since have seen her.  She still been taking her Lexapro and Wellbutrin every day and feels her depression is very well controlled.  She denies any hopelessness.  She denies any suicidal or homicidal ideation.  She has been compliant with her medication.  She has had another episode of diverticulitis since I last saw her with abscess.  She has a scope with Dr. Chau in March.  She was admitted to [facility] and we did not get the records.  A surgeon saw her while she was there.  She is currently on a PPI as well as MiraLAX.  They want to rule out inflammatory bowel disease because she has had recurrent diverticulitis.  Her father had a partial sigmoidectomy.  She feels pretty good right now without abdominal pain.      Objective   Vital Signs:   /68   Pulse 98   Temp 97.7 °F (36.5 °C)   Ht 167.6 cm (66\")   Wt 67.4 kg (148 lb 9.6 oz)   SpO2 99%   BMI 23.98 kg/m²     Physical Exam  Constitutional:       General: She is not in acute distress.     Appearance: She is well-developed.   HENT:      Head: Normocephalic and atraumatic.   Eyes:      Conjunctiva/sclera: Conjunctivae normal.      Pupils: Pupils are equal, round, and reactive to light.   Pulmonary:      Effort: Pulmonary effort is normal. No respiratory distress.   Neurological:      Mental Status: She is alert and oriented to person, place, and time.        Result Review :                 Assessment and Plan    Problem List Items Addressed This Visit        Gastrointestinal Abdominal     Acid reflux       Mental Health    Depression - Primary    Relevant Medications    escitalopram (LEXAPRO) 20 MG tablet    buPROPion XL (WELLBUTRIN XL) 300 MG 24 hr tablet      Other Visit Diagnoses     Diverticulitis  (Chronic)       Hyperlipidemia, " unspecified hyperlipidemia type              Follow Up   Return in about 6 months (around 8/3/2021) for Annual physical.  Patient was given instructions and counseling regarding her condition or for health maintenance advice. Please see specific information pulled into the AVS if appropriate.

## 2021-03-08 DIAGNOSIS — S46.911A STRAIN OF RIGHT SHOULDER, INITIAL ENCOUNTER: ICD-10-CM

## 2021-03-09 RX ORDER — CYCLOBENZAPRINE HCL 10 MG
TABLET ORAL
Qty: 90 TABLET | Refills: 2 | Status: SHIPPED | OUTPATIENT
Start: 2021-03-09 | End: 2021-08-25

## 2021-04-02 ENCOUNTER — BULK ORDERING (OUTPATIENT)
Dept: CASE MANAGEMENT | Facility: OTHER | Age: 41
End: 2021-04-02

## 2021-04-02 DIAGNOSIS — Z23 IMMUNIZATION DUE: ICD-10-CM

## 2021-04-12 ENCOUNTER — OFFICE VISIT (OUTPATIENT)
Dept: FAMILY MEDICINE CLINIC | Facility: CLINIC | Age: 41
End: 2021-04-12

## 2021-04-12 VITALS
HEART RATE: 105 BPM | BODY MASS INDEX: 23.18 KG/M2 | HEIGHT: 66 IN | OXYGEN SATURATION: 98 % | WEIGHT: 144.2 LBS | DIASTOLIC BLOOD PRESSURE: 72 MMHG | TEMPERATURE: 97.8 F | SYSTOLIC BLOOD PRESSURE: 110 MMHG

## 2021-04-12 DIAGNOSIS — R39.198 DIFFICULTY URINATING: Primary | ICD-10-CM

## 2021-04-12 DIAGNOSIS — N30.90 CYSTITIS: ICD-10-CM

## 2021-04-12 LAB
BILIRUB BLD-MCNC: ABNORMAL MG/DL
CLARITY, POC: CLEAR
COLOR UR: ABNORMAL
GLUCOSE UR STRIP-MCNC: NEGATIVE MG/DL
KETONES UR QL: NEGATIVE
LEUKOCYTE EST, POC: ABNORMAL
NITRITE UR-MCNC: POSITIVE MG/ML
PH UR: 6 [PH] (ref 5–8)
PROT UR STRIP-MCNC: NEGATIVE MG/DL
RBC # UR STRIP: ABNORMAL /UL
SP GR UR: 1.02 (ref 1–1.03)
UROBILINOGEN UR QL: ABNORMAL

## 2021-04-12 PROCEDURE — 99213 OFFICE O/P EST LOW 20 MIN: CPT | Performed by: FAMILY MEDICINE

## 2021-04-12 RX ORDER — SULFAMETHOXAZOLE AND TRIMETHOPRIM 800; 160 MG/1; MG/1
1 TABLET ORAL 2 TIMES DAILY
Qty: 10 TABLET | Refills: 0 | Status: SHIPPED | OUTPATIENT
Start: 2021-04-12 | End: 2021-04-17

## 2021-04-12 NOTE — PROGRESS NOTES
"Chief Complaint  Difficulty Urinating (burning when urinating)    Subjective          Marleny Palma presents to [facility]  Has been having bladder spasms over the weekend.  Has had them off and on since her hysterectomy o=in 2014.  Went ti urgent care in February for bladder spasms and had a negative culture.  Took pyridium untilt it was better.  Has been passing some small blood clots.  Pain is worse at the beginnig of micturation.  No pain in back or sides.  No nausea or vomiting.       Objective   Vital Signs:   /72   Pulse 105   Temp 97.8 °F (36.6 °C)   Ht 167.6 cm (66\")   Wt 65.4 kg (144 lb 3.2 oz)   SpO2 98%   BMI 23.27 kg/m²     Physical Exam  Vitals and nursing note reviewed.   Constitutional:       General: She is not in acute distress.  HENT:      Head: Normocephalic and atraumatic.      Right Ear: External ear normal.      Left Ear: External ear normal.   Eyes:      Conjunctiva/sclera: Conjunctivae normal.      Pupils: Pupils are equal, round, and reactive to light.   Pulmonary:      Effort: No respiratory distress.   Abdominal:      General: There is distension.      Tenderness: There is abdominal tenderness in the suprapubic area. There is no right CVA tenderness, left CVA tenderness, guarding or rebound.   Neurological:      Mental Status: She is alert and oriented to person, place, and time.   Psychiatric:         Mood and Affect: Mood normal.         Behavior: Behavior normal.        Result Review :   The following data was reviewed by: Meredith Lea Kehrer, MD on 04/12/2021:  UA    Urinalysis 4/12/21   Ketones, UA Negative   Leukocytes, UA Moderate (2+) (A)   (A) Abnormal value                      Assessment and Plan    Diagnoses and all orders for this visit:    1. Difficulty urinating (Primary)  -     POC Urinalysis Dipstick, Automated    2. Cystitis  -     sulfamethoxazole-trimethoprim (Bactrim DS) 800-160 MG per tablet; Take 1 tablet by mouth 2 (Two) " Times a Day for 5 days.  Dispense: 10 tablet; Refill: 0  -     Urine Culture - Urine, Urine, Clean Catch    Cystitis-we will do Bactrim await for urine culture since we are unable to totally see UA on dip.    Follow Up   No follow-ups on file.  Patient was given instructions and counseling regarding her condition or for health maintenance advice. Please see specific information pulled into the AVS if appropriate.

## 2021-04-14 LAB
BACTERIA UR CULT: NORMAL
BACTERIA UR CULT: NORMAL

## 2021-08-11 ENCOUNTER — OFFICE VISIT (OUTPATIENT)
Dept: FAMILY MEDICINE CLINIC | Facility: CLINIC | Age: 41
End: 2021-08-11

## 2021-08-11 VITALS
HEART RATE: 76 BPM | BODY MASS INDEX: 23.43 KG/M2 | SYSTOLIC BLOOD PRESSURE: 116 MMHG | HEIGHT: 66 IN | TEMPERATURE: 97.7 F | DIASTOLIC BLOOD PRESSURE: 76 MMHG | OXYGEN SATURATION: 98 % | WEIGHT: 145.8 LBS

## 2021-08-11 DIAGNOSIS — Z00.00 ROUTINE HEALTH MAINTENANCE: Primary | ICD-10-CM

## 2021-08-11 DIAGNOSIS — Z11.59 NEED FOR HEPATITIS C SCREENING TEST: ICD-10-CM

## 2021-08-11 DIAGNOSIS — F33.42 RECURRENT MAJOR DEPRESSIVE DISORDER, IN FULL REMISSION (HCC): ICD-10-CM

## 2021-08-11 DIAGNOSIS — R53.83 OTHER FATIGUE: ICD-10-CM

## 2021-08-11 PROCEDURE — 99396 PREV VISIT EST AGE 40-64: CPT | Performed by: FAMILY MEDICINE

## 2021-08-11 RX ORDER — DESVENLAFAXINE SUCCINATE 50 MG/1
50 TABLET, EXTENDED RELEASE ORAL DAILY
Qty: 30 TABLET | Refills: 1 | Status: SHIPPED | OUTPATIENT
Start: 2021-08-11 | End: 2021-10-01

## 2021-08-11 RX ORDER — POLYETHYLENE GLYCOL 3350 17 G/17G
POWDER, FOR SOLUTION ORAL
COMMUNITY
Start: 2021-07-07 | End: 2022-03-03 | Stop reason: HOSPADM

## 2021-08-11 RX ORDER — BUPROPION HYDROCHLORIDE 150 MG/1
150 TABLET ORAL DAILY
Qty: 30 TABLET | Refills: 1 | Status: SHIPPED | OUTPATIENT
Start: 2021-08-11 | End: 2021-10-20

## 2021-08-11 NOTE — PROGRESS NOTES
Subjective   Marleny Palma is a 41 y.o. female who presents for annual female wellness exam.  Chief Complaint   Patient presents with   • Constipation   • Anxiety   • Med Refill   • Depression   Has been very tired.  Working 13 days straight at the [employer].   Mood OK but is a little more anxious.  Has been on the same medication for 12-13 years.  She is not made any more depressed.  She denies any SI or HI.  She denies any hopelessness.       Menstrual History: s/p hyst  Pregnancy History: G#  Sexual History: with spouse   Contraception: na  Hormone Replacement Therapy: na  Diet: standard  Exercise: some    Mammogram: defers  Pap Smear: na  Bone Density: na  Colon Cancer Screening: na    Immunization History   Administered Date(s) Administered   • Flu Vaccine Intradermal Quad 18-64YR 10/11/2016   • Flulaval/Fluarix/Fluzone Quad 11/20/2019   • Tdap 10/11/2016       The following portions of the patient's history were reviewed and updated as appropriate: allergies, current medications, past family history, past medical history, past social history, past surgical history and problem list.    Past Medical History:   Diagnosis Date   • Abnormal menstrual cycle    • Acid reflux    • Allergic rhinitis    • Depression    • Diverticulosis    • Fatigue    • Headache    • Hiatal hernia    • Hidradenitis axillaris        Past Surgical History:   Procedure Laterality Date   • AXILLARY HIDRADENITIS EXCISION  11/16/2011   • HYSTERECTOMY     • INCISION AND DRAINAGE ABSCESS  09/04/2013    axilla   • TONSILLECTOMY         Family History   Problem Relation Age of Onset   • Colon polyps Father    • Hypertension Father    • Heart defect Other    • Stroke Other    • Diabetes Other    • Hypertension Other        Social History     Socioeconomic History   • Marital status:      Spouse name: Not on file   • Number of children: Not on file   • Years of education: Not on file   • Highest education level: Not on file   Tobacco Use   •  Smoking status: Never Smoker   • Smokeless tobacco: Never Used   Substance and Sexual Activity   • Alcohol use: No   • Drug use: Defer   • Sexual activity: Defer         Current Outpatient Medications:   •  acetaminophen (TYLENOL) 325 MG tablet, Take 650 mg by mouth., Disp: , Rfl:   •  cyclobenzaprine (FLEXERIL) 10 MG tablet, TAKE 1 TABLET BY MOUTH THREE TIMES DAILY AS NEEDED FOR MUSCLE SPASMS, Disp: 90 tablet, Rfl: 2  •  methylcellulose oral powder, Take  by mouth Daily., Disp: , Rfl:   •  pantoprazole (PROTONIX) 40 MG EC tablet, TAKE 1 TABLET BY MOUTH DAILY, Disp: 90 tablet, Rfl: 0  •  polyethylene glycol (MIRALAX) 17 GM/SCOOP powder, MIX 1 CAPFULL OF POWDER IN 8 OZ OF LIQUID AND DRINK 1 TO 2 TIMES DAILY, Disp: , Rfl:   •  buPROPion XL (Wellbutrin XL) 150 MG 24 hr tablet, Take 1 tablet by mouth Daily., Disp: 30 tablet, Rfl: 1  •  desvenlafaxine (Pristiq) 50 MG 24 hr tablet, Take 1 tablet by mouth Daily., Disp: 30 tablet, Rfl: 1  •  saccharomyces boulardii (FLORASTOR) 250 MG capsule, Take 250 mg by mouth 2 (Two) Times a Day., Disp: , Rfl:     Review of Systems   Constitutional: Negative for chills, fatigue and fever.   HENT: Negative for congestion, ear pain, rhinorrhea and sore throat.    Eyes: Negative for pain and redness.   Respiratory: Negative for cough, chest tightness and wheezing.    Cardiovascular: Negative for chest pain and leg swelling.   Gastrointestinal: Negative for abdominal pain, constipation, diarrhea, nausea and vomiting.   Endocrine: Negative for polydipsia and polyphagia.   Genitourinary: Negative for dysuria and hematuria.   Musculoskeletal: Negative for arthralgias and myalgias.   Skin: Negative for color change and rash.   Allergic/Immunologic: Negative.    Neurological: Negative for dizziness, weakness, light-headedness and headaches.   Hematological: Negative.    Psychiatric/Behavioral: Positive for dysphoric mood. Negative for confusion, self-injury, sleep disturbance and suicidal ideas.  "The patient is nervous/anxious.        Objective   Vitals:    08/11/21 1513   BP: 116/76   Pulse: 76   Temp: 97.7 °F (36.5 °C)   SpO2: 98%   Weight: 66.1 kg (145 lb 12.8 oz)   Height: 167.6 cm (66\")     Body mass index is 23.53 kg/m².  Physical Exam      Assessment/Plan   Diagnoses and all orders for this visit:    1. Routine health maintenance (Primary)  -     Lipid Panel    2. Recurrent major depressive disorder, in full remission (CMS/HCC)  -     desvenlafaxine (Pristiq) 50 MG 24 hr tablet; Take 1 tablet by mouth Daily.  Dispense: 30 tablet; Refill: 1  -     buPROPion XL (Wellbutrin XL) 150 MG 24 hr tablet; Take 1 tablet by mouth Daily.  Dispense: 30 tablet; Refill: 1    3. Other fatigue  -     CBC & Differential  -     Comprehensive Metabolic Panel  -     TSH    4. Need for hepatitis C screening test  -     Hepatitis C Antibody      Current depression-not in remission any longer, worsening anxiety, will do a trial of changing her medication with close follow-up, patient contracts for safety  Excessive fatigue-we will get screening labs today, follow-up pending results.         Discussed the importance of maintaining a healthy weight and getting regular exercise.  Educated patient on the benefits of healthy diet.  Advise follow-up annually for wellness exams.    Patient Instructions   Let me know if you have any side effects of new medication.        "

## 2021-08-12 DIAGNOSIS — F33.42 RECURRENT MAJOR DEPRESSIVE DISORDER, IN FULL REMISSION (HCC): Chronic | ICD-10-CM

## 2021-08-12 LAB
ALBUMIN SERPL-MCNC: 4.7 G/DL (ref 3.8–4.8)
ALBUMIN/GLOB SERPL: 2.1 {RATIO} (ref 1.2–2.2)
ALP SERPL-CCNC: 80 IU/L (ref 48–121)
ALT SERPL-CCNC: 8 IU/L (ref 0–32)
AST SERPL-CCNC: 14 IU/L (ref 0–40)
BASOPHILS # BLD AUTO: 0 X10E3/UL (ref 0–0.2)
BASOPHILS NFR BLD AUTO: 1 %
BILIRUB SERPL-MCNC: 0.3 MG/DL (ref 0–1.2)
BUN SERPL-MCNC: 12 MG/DL (ref 6–24)
BUN/CREAT SERPL: 16 (ref 9–23)
CALCIUM SERPL-MCNC: 10 MG/DL (ref 8.7–10.2)
CHLORIDE SERPL-SCNC: 103 MMOL/L (ref 96–106)
CHOLEST SERPL-MCNC: 243 MG/DL (ref 100–199)
CO2 SERPL-SCNC: 24 MMOL/L (ref 20–29)
CREAT SERPL-MCNC: 0.76 MG/DL (ref 0.57–1)
EOSINOPHIL # BLD AUTO: 0.1 X10E3/UL (ref 0–0.4)
EOSINOPHIL NFR BLD AUTO: 1 %
ERYTHROCYTE [DISTWIDTH] IN BLOOD BY AUTOMATED COUNT: 12.5 % (ref 11.7–15.4)
GLOBULIN SER CALC-MCNC: 2.2 G/DL (ref 1.5–4.5)
GLUCOSE SERPL-MCNC: 93 MG/DL (ref 65–99)
HCT VFR BLD AUTO: 37.4 % (ref 34–46.6)
HCV AB S/CO SERPL IA: <0.1 S/CO RATIO (ref 0–0.9)
HDLC SERPL-MCNC: 49 MG/DL
HGB BLD-MCNC: 12.2 G/DL (ref 11.1–15.9)
IMM GRANULOCYTES # BLD AUTO: 0 X10E3/UL (ref 0–0.1)
IMM GRANULOCYTES NFR BLD AUTO: 0 %
LDLC SERPL CALC-MCNC: 143 MG/DL (ref 0–99)
LYMPHOCYTES # BLD AUTO: 1.6 X10E3/UL (ref 0.7–3.1)
LYMPHOCYTES NFR BLD AUTO: 32 %
MCH RBC QN AUTO: 30.3 PG (ref 26.6–33)
MCHC RBC AUTO-ENTMCNC: 32.6 G/DL (ref 31.5–35.7)
MCV RBC AUTO: 93 FL (ref 79–97)
MONOCYTES # BLD AUTO: 0.4 X10E3/UL (ref 0.1–0.9)
MONOCYTES NFR BLD AUTO: 8 %
NEUTROPHILS # BLD AUTO: 2.9 X10E3/UL (ref 1.4–7)
NEUTROPHILS NFR BLD AUTO: 58 %
PLATELET # BLD AUTO: 401 X10E3/UL (ref 150–450)
POTASSIUM SERPL-SCNC: 4.6 MMOL/L (ref 3.5–5.2)
PROT SERPL-MCNC: 6.9 G/DL (ref 6–8.5)
RBC # BLD AUTO: 4.02 X10E6/UL (ref 3.77–5.28)
SODIUM SERPL-SCNC: 141 MMOL/L (ref 134–144)
TRIGL SERPL-MCNC: 280 MG/DL (ref 0–149)
TSH SERPL DL<=0.005 MIU/L-ACNC: 0.73 UIU/ML (ref 0.45–4.5)
VLDLC SERPL CALC-MCNC: 51 MG/DL (ref 5–40)
WBC # BLD AUTO: 5 X10E3/UL (ref 3.4–10.8)

## 2021-08-12 RX ORDER — BUPROPION HYDROCHLORIDE 150 MG/1
150 TABLET ORAL DAILY
Qty: 30 TABLET | Refills: 1 | OUTPATIENT
Start: 2021-08-12

## 2021-08-12 RX ORDER — ESCITALOPRAM OXALATE 20 MG/1
20 TABLET ORAL DAILY
Qty: 90 TABLET | Refills: 1 | OUTPATIENT
Start: 2021-08-12

## 2021-08-12 RX ORDER — BUPROPION HYDROCHLORIDE 300 MG/1
300 TABLET ORAL EVERY MORNING
Qty: 90 TABLET | Refills: 1 | OUTPATIENT
Start: 2021-08-12

## 2021-08-12 NOTE — TELEPHONE ENCOUNTER
Escitalopram was discontinued yesterday and her Wellbutrin was already refilled at the lower dose of 150.

## 2021-08-25 DIAGNOSIS — S46.911A STRAIN OF RIGHT SHOULDER, INITIAL ENCOUNTER: ICD-10-CM

## 2021-08-25 RX ORDER — CYCLOBENZAPRINE HCL 10 MG
TABLET ORAL
Qty: 90 TABLET | Refills: 2 | Status: SHIPPED | OUTPATIENT
Start: 2021-08-25 | End: 2022-01-30 | Stop reason: SDUPTHER

## 2021-10-01 DIAGNOSIS — F33.42 RECURRENT MAJOR DEPRESSIVE DISORDER, IN FULL REMISSION (HCC): ICD-10-CM

## 2021-10-01 RX ORDER — DESVENLAFAXINE SUCCINATE 50 MG/1
50 TABLET, EXTENDED RELEASE ORAL DAILY
Qty: 30 TABLET | Refills: 1 | Status: SHIPPED | OUTPATIENT
Start: 2021-10-01 | End: 2021-10-20

## 2021-10-20 ENCOUNTER — OFFICE VISIT (OUTPATIENT)
Dept: FAMILY MEDICINE CLINIC | Facility: CLINIC | Age: 41
End: 2021-10-20

## 2021-10-20 VITALS
BODY MASS INDEX: 23.33 KG/M2 | DIASTOLIC BLOOD PRESSURE: 76 MMHG | WEIGHT: 145.2 LBS | SYSTOLIC BLOOD PRESSURE: 122 MMHG | TEMPERATURE: 98.2 F | HEART RATE: 78 BPM | OXYGEN SATURATION: 98 % | HEIGHT: 66 IN

## 2021-10-20 DIAGNOSIS — J30.1 SEASONAL ALLERGIC RHINITIS DUE TO POLLEN: ICD-10-CM

## 2021-10-20 DIAGNOSIS — F33.41 RECURRENT MAJOR DEPRESSIVE DISORDER, IN PARTIAL REMISSION (HCC): Primary | ICD-10-CM

## 2021-10-20 PROCEDURE — 99213 OFFICE O/P EST LOW 20 MIN: CPT | Performed by: FAMILY MEDICINE

## 2021-10-20 RX ORDER — CHLORCYCLIZINE HYDROCHLORIDE AND PSEUDOEPHEDRINE HYDROCHLORIDE 25; 60 MG/1; MG/1
1 TABLET ORAL DAILY
Qty: 90 TABLET | Refills: 0 | Status: SHIPPED | OUTPATIENT
Start: 2021-10-20 | End: 2022-01-03 | Stop reason: SDUPTHER

## 2021-10-20 RX ORDER — DESVENLAFAXINE 100 MG/1
100 TABLET, EXTENDED RELEASE ORAL DAILY
Qty: 90 TABLET | Refills: 1 | Status: SHIPPED | OUTPATIENT
Start: 2021-10-20 | End: 2022-04-11

## 2021-10-20 NOTE — PROGRESS NOTES
"Answers for HPI/ROS submitted by the patient on 10/20/2021  Please describe your symptoms.: 2 month Follow up to evaluate depression/anxiety after med change  Have you had these symptoms before?: Yes  How long have you been having these symptoms?: Greater than 2 weeks  Please list any medications you are currently taking for this condition.: Pristiq 50mg daily  Please describe any probable cause for these symptoms. : History depression/anxiety  What is the primary reason for your visit?: Other    Chief Complaint  Depression    Subjective          Marleny Palma presents to [facility]  Patient presents to follow-up after changing her medication.  She thinks the Pristiq is really help with anxiety component but she has since stopped her Wellbutrin.  She does not think it added anything to the Pristiq.  She is doing much better than she was previously and would like to try the higher dose.  She denies any SI or HI.  She denies any hopelessness or panic attacks.        Objective   Vital Signs:   /76   Pulse 78   Temp 98.2 °F (36.8 °C)   Ht 167.6 cm (66\")   Wt 65.9 kg (145 lb 3.2 oz)   SpO2 98%   BMI 23.44 kg/m²     Physical Exam  Constitutional:       General: She is not in acute distress.     Appearance: Normal appearance. She is well-developed.   HENT:      Head: Normocephalic and atraumatic.      Right Ear: External ear normal.      Left Ear: External ear normal.   Eyes:      Conjunctiva/sclera: Conjunctivae normal.      Pupils: Pupils are equal, round, and reactive to light.   Neck:      Thyroid: No thyromegaly.   Pulmonary:      Effort: Pulmonary effort is normal.   Neurological:      Mental Status: She is alert and oriented to person, place, and time.   Psychiatric:         Mood and Affect: Mood normal.         Behavior: Behavior normal.        Result Review :                 Assessment and Plan    Diagnoses and all orders for this visit:    1. Recurrent major depressive " disorder, in partial remission (HCC) (Primary)    2. Seasonal allergic rhinitis due to pollen  -     Chlorcyclizine-Pseudoephed (Stahist AD) 25-60 MG tablet; Take 1 tablet by mouth Daily.  Dispense: 90 tablet; Refill: 0  -     desvenlafaxine (Pristiq) 100 MG 24 hr tablet; Take 1 tablet by mouth Daily.  Dispense: 90 tablet; Refill: 1    Recurrent depression-improved, will increase Pristiq and follow-up in a few months    Follow Up   Return in about 3 months (around 1/20/2022) for Recheck mood.  Patient was given instructions and counseling regarding her condition or for health maintenance advice. Please see specific information pulled into the AVS if appropriate.

## 2021-11-11 ENCOUNTER — OFFICE VISIT (OUTPATIENT)
Dept: FAMILY MEDICINE CLINIC | Facility: CLINIC | Age: 41
End: 2021-11-11

## 2021-11-11 VITALS
TEMPERATURE: 97.5 F | HEART RATE: 116 BPM | WEIGHT: 140.2 LBS | BODY MASS INDEX: 22.53 KG/M2 | DIASTOLIC BLOOD PRESSURE: 62 MMHG | SYSTOLIC BLOOD PRESSURE: 122 MMHG | OXYGEN SATURATION: 99 % | HEIGHT: 66 IN

## 2021-11-11 DIAGNOSIS — D64.9 ANEMIA, UNSPECIFIED TYPE: ICD-10-CM

## 2021-11-11 DIAGNOSIS — K57.92 DIVERTICULITIS: Primary | ICD-10-CM

## 2021-11-11 PROCEDURE — 99214 OFFICE O/P EST MOD 30 MIN: CPT | Performed by: FAMILY MEDICINE

## 2021-11-11 RX ORDER — HYDROCODONE BITARTRATE AND ACETAMINOPHEN 5; 325 MG/1; MG/1
1 TABLET ORAL EVERY 6 HOURS PRN
Qty: 15 TABLET | Refills: 0 | Status: SHIPPED | OUTPATIENT
Start: 2021-11-11 | End: 2021-11-19

## 2021-11-11 RX ORDER — ONDANSETRON 4 MG/1
4 TABLET, FILM COATED ORAL EVERY 6 HOURS PRN
COMMUNITY
Start: 2021-11-04 | End: 2022-02-12

## 2021-11-11 RX ORDER — FAMOTIDINE 20 MG/1
20 TABLET, FILM COATED ORAL 2 TIMES DAILY
COMMUNITY
End: 2021-11-19

## 2021-11-11 NOTE — PATIENT INSTRUCTIONS
Diverticulitis    Diverticulitis is infection or inflammation of small pouches (diverticula) in the colon that form due to a condition called diverticulosis. Diverticula can trap stool (feces) and bacteria, causing infection and inflammation.  Diverticulitis may cause severe stomach pain and diarrhea. It may lead to tissue damage in the colon that causes bleeding or blockage. The diverticula may also burst (rupture) and cause infected stool to enter other areas of the abdomen.  What are the causes?  This condition is caused by stool becoming trapped in the diverticula, which allows bacteria to grow in the diverticula. This leads to inflammation and infection.  What increases the risk?  You are more likely to develop this condition if you have diverticulosis. The risk increases if you:  · Are overweight or obese.  · Do not get enough exercise.  · Drink alcohol.  · Use tobacco products.  · Eat a diet that has a lot of red meat such as beef, pork, or lamb.  · Eat a diet that does not include enough fiber. High-fiber foods include fruits, vegetables, beans, nuts, and whole grains.  · Are over 40 years of age.  What are the signs or symptoms?  Symptoms of this condition may include:  · Pain and tenderness in the abdomen. The pain is normally located on the left side of the abdomen, but it may occur in other areas.  · Fever and chills.  · Nausea.  · Vomiting.  · Cramping.  · Bloating.  · Changes in bowel routines.  · Blood in your stool.  How is this diagnosed?  This condition is diagnosed based on:  · Your medical history.  · A physical exam.  · Tests to make sure there is nothing else causing your condition. These tests may include:  ? Blood tests.  ? Urine tests.  ? CT scan of the abdomen.  How is this treated?  Most cases of this condition are mild and can be treated at home. Treatment may include:  · Taking over-the-counter pain medicines.  · Following a clear liquid diet.  · Taking antibiotic medicines by  mouth.  · Resting.  More severe cases may need to be treated at a hospital. Treatment may include:  · Not eating or drinking.  · Taking prescription pain medicine.  · Receiving antibiotic medicines through an IV.  · Receiving fluids and nutrition through an IV.  · Surgery.  When your condition is under control, your health care provider may recommend that you have a colonoscopy. This is an exam to look at the entire large intestine. During the exam, a lubricated, bendable tube is inserted into the anus and then passed into the rectum, colon, and other parts of the large intestine. A colonoscopy can show how severe your diverticula are and whether something else may be causing your symptoms.  Follow these instructions at home:  Medicines  · Take over-the-counter and prescription medicines only as told by your health care provider. These include fiber supplements, probiotics, and stool softeners.  · If you were prescribed an antibiotic medicine, take it as told by your health care provider. Do not stop taking the antibiotic even if you start to feel better.  · Ask your health care provider if the medicine prescribed to you requires you to avoid driving or using machinery.  Eating and drinking    · Follow a full liquid diet or another diet as directed by your health care provider.  · After your symptoms improve, your health care provider may tell you to change your diet. He or she may recommend that you eat a diet that contains at least 25 grams (25 g) of fiber daily. Fiber makes it easier to pass stool. Healthy sources of fiber include:  ? Berries. One cup contains 4-8 grams of fiber.  ? Beans or lentils. One-half cup contains 5-8 grams of fiber.  ? Green vegetables. One cup contains 4 grams of fiber.  · Avoid eating red meat.    General instructions  · Do not use any products that contain nicotine or tobacco, such as cigarettes, e-cigarettes, and chewing tobacco. If you need help quitting, ask your health care  provider.  · Exercise for at least 30 minutes, 3 times each week. You should exercise hard enough to raise your heart rate and break a sweat.  · Keep all follow-up visits as told by your health care provider. This is important. You may need to have a colonoscopy.  Contact a health care provider if:  · Your pain does not improve.  · Your bowel movements do not return to normal.  Get help right away if:  · Your pain gets worse.  · Your symptoms do not get better with treatment.  · Your symptoms suddenly get worse.  · You have a fever.  · You vomit more than one time.  · You have stools that are bloody, black, or tarry.  Summary  · Diverticulitis is infection or inflammation of small pouches (diverticula) in the colon that form due to a condition called diverticulosis. Diverticula can trap stool (feces) and bacteria, causing infection and inflammation.  · You are at higher risk for this condition if you have diverticulosis and you eat a diet that does not include enough fiber.  · Most cases of this condition are mild and can be treated at home. More severe cases may need to be treated at a hospital.  · When your condition is under control, your health care provider may recommend that you have an exam called a colonoscopy. This exam can show how severe your diverticula are and whether something else may be causing your symptoms.  · Keep all follow-up visits as told by your health care provider. This is important.  This information is not intended to replace advice given to you by your health care provider. Make sure you discuss any questions you have with your health care provider.  Document Revised: 09/28/2020 Document Reviewed: 09/28/2020  [employer] Patient Education © 2021 [employer]

## 2021-11-11 NOTE — PROGRESS NOTES
"Chief Complaint  Hospital Follow Up Visit (diverticulits [facility], 11/1/21-11/4/21)    Subjective          Marleny Palma presents to [facility]  Started with LLQ pain on 10/22.  Three days later went to the ER in [place].  Most of large intestine was inflamed.  Was given Cipro and flagyl.  Sees Dr. Moreno for GI.  Went back to ER in 11/1 with severe abdominal pain.  WBC had gone up to 13K.  Admitted and put on IVF and IV zosyn.  Discharged on 11/4.  Still in some pain.  Discharged on augmentin and hydrocodone.   Has not been back to work.  Sitting around the house.  Worried about going through surgery.  Really wants another opinion.  Has been getting episodes every 3 to 4 months now.  Last Augmentin was last night.  No fevers.  Has been taking some ibuprofen.  Is out of hydrocodone.  Feels like she could use a few more.  She still feels like she might need them to get through the days.  She wants to try to go back to work next week.  Was also told she was anemic in the hospital but was not discharged on anything forward nor was told what it was caused by.  She is not had any black or tarry stools or blood in her stool.      Objective   Vital Signs:   /62   Pulse 116   Temp 97.5 °F (36.4 °C)   Ht 167.6 cm (66\")   Wt 63.6 kg (140 lb 3.2 oz)   SpO2 99%   BMI 22.63 kg/m²     Physical Exam  Constitutional:       General: She is not in acute distress.     Appearance: Normal appearance. She is well-developed.   HENT:      Head: Normocephalic and atraumatic.      Right Ear: Tympanic membrane, ear canal and external ear normal.      Left Ear: Tympanic membrane, ear canal and external ear normal.      Mouth/Throat:      Mouth: Mucous membranes are moist.      Pharynx: Oropharynx is clear.   Eyes:      Conjunctiva/sclera: Conjunctivae normal.      Pupils: Pupils are equal, round, and reactive to light.   Neck:      Thyroid: No thyromegaly.   Cardiovascular:      Rate and " Rhythm: Normal rate and regular rhythm.      Heart sounds: No murmur heard.      Pulmonary:      Effort: Pulmonary effort is normal.      Breath sounds: Normal breath sounds. No wheezing.   Abdominal:      General: Bowel sounds are normal.      Palpations: Abdomen is soft.      Tenderness: There is abdominal tenderness in the left lower quadrant. There is no guarding or rebound.   Musculoskeletal:         General: Normal range of motion.      Cervical back: Neck supple.   Lymphadenopathy:      Cervical: No cervical adenopathy.   Skin:     General: Skin is warm and dry.   Neurological:      Mental Status: She is alert and oriented to person, place, and time.   Psychiatric:         Mood and Affect: Mood normal.         Behavior: Behavior normal.        Result Review :                 Assessment and Plan    Diagnoses and all orders for this visit:    1. Diverticulitis (Primary)  -     Ambulatory Referral to Colorectal Surgery  -     CBC & Differential  -     Comprehensive Metabolic Panel  -     HYDROcodone-acetaminophen (NORCO) 5-325 MG per tablet; Take 1 tablet by mouth Every 6 (Six) Hours As Needed for Moderate Pain  or Severe Pain .  Dispense: 15 tablet; Refill: 0    2. Anemia, unspecified type  -     CBC & Differential  -     Vitamin B12  -     Folate  -     Iron and TIBC  -     Ferritin    Diverticulitis which has been recurrent-we will get her in to see colorectal surgery, recheck labs today and refill a few hydrocodone, given note to get back to work next week  Anemia-we will check labs for etiology today    Follow Up   No follow-ups on file.  Patient was given instructions and counseling regarding her condition or for health maintenance advice. Please see specific information pulled into the AVS if appropriate.

## 2021-11-12 LAB
ALBUMIN SERPL-MCNC: 5.1 G/DL (ref 3.8–4.8)
ALBUMIN/GLOB SERPL: 2 {RATIO} (ref 1.2–2.2)
ALP SERPL-CCNC: 84 IU/L (ref 44–121)
ALT SERPL-CCNC: 15 IU/L (ref 0–32)
AST SERPL-CCNC: 14 IU/L (ref 0–40)
BASOPHILS # BLD AUTO: 0.1 X10E3/UL (ref 0–0.2)
BASOPHILS NFR BLD AUTO: 1 %
BILIRUB SERPL-MCNC: 0.3 MG/DL (ref 0–1.2)
BUN SERPL-MCNC: 17 MG/DL (ref 6–24)
BUN/CREAT SERPL: 20 (ref 9–23)
CALCIUM SERPL-MCNC: 10.4 MG/DL (ref 8.7–10.2)
CHLORIDE SERPL-SCNC: 98 MMOL/L (ref 96–106)
CO2 SERPL-SCNC: 21 MMOL/L (ref 20–29)
CREAT SERPL-MCNC: 0.83 MG/DL (ref 0.57–1)
EOSINOPHIL # BLD AUTO: 0.1 X10E3/UL (ref 0–0.4)
EOSINOPHIL NFR BLD AUTO: 2 %
ERYTHROCYTE [DISTWIDTH] IN BLOOD BY AUTOMATED COUNT: 12.4 % (ref 11.7–15.4)
FERRITIN SERPL-MCNC: 45 NG/ML (ref 15–150)
FOLATE SERPL-MCNC: 10.3 NG/ML
GLOBULIN SER CALC-MCNC: 2.6 G/DL (ref 1.5–4.5)
GLUCOSE SERPL-MCNC: 107 MG/DL (ref 65–99)
HCT VFR BLD AUTO: 38.8 % (ref 34–46.6)
HGB BLD-MCNC: 13.2 G/DL (ref 11.1–15.9)
IMM GRANULOCYTES # BLD AUTO: 0 X10E3/UL (ref 0–0.1)
IMM GRANULOCYTES NFR BLD AUTO: 0 %
IRON SATN MFR SERPL: 27 % (ref 15–55)
IRON SERPL-MCNC: 103 UG/DL (ref 27–159)
LYMPHOCYTES # BLD AUTO: 1.4 X10E3/UL (ref 0.7–3.1)
LYMPHOCYTES NFR BLD AUTO: 30 %
MCH RBC QN AUTO: 30.7 PG (ref 26.6–33)
MCHC RBC AUTO-ENTMCNC: 34 G/DL (ref 31.5–35.7)
MCV RBC AUTO: 90 FL (ref 79–97)
MONOCYTES # BLD AUTO: 0.4 X10E3/UL (ref 0.1–0.9)
MONOCYTES NFR BLD AUTO: 8 %
NEUTROPHILS # BLD AUTO: 2.7 X10E3/UL (ref 1.4–7)
NEUTROPHILS NFR BLD AUTO: 59 %
PLATELET # BLD AUTO: 487 X10E3/UL (ref 150–450)
POTASSIUM SERPL-SCNC: 4.2 MMOL/L (ref 3.5–5.2)
PROT SERPL-MCNC: 7.7 G/DL (ref 6–8.5)
RBC # BLD AUTO: 4.3 X10E6/UL (ref 3.77–5.28)
SODIUM SERPL-SCNC: 144 MMOL/L (ref 134–144)
TIBC SERPL-MCNC: 380 UG/DL (ref 250–450)
UIBC SERPL-MCNC: 277 UG/DL (ref 131–425)
VIT B12 SERPL-MCNC: 679 PG/ML (ref 232–1245)
WBC # BLD AUTO: 4.6 X10E3/UL (ref 3.4–10.8)

## 2021-11-18 ENCOUNTER — TELEPHONE (OUTPATIENT)
Dept: SURGERY | Facility: CLINIC | Age: 41
End: 2021-11-18

## 2021-11-18 NOTE — TELEPHONE ENCOUNTER
KEISHAI-  Per LISA Gasca asked that I call patient and ask for the following per Dr. Alejandro Johnston's request the CT report & CD of CT scan ER in [place].  She said she is picking those up today.    Also wants CT & records from when she had an abscess.  Patient said she can request those today and will bring in tomorrow.    Patient asked if she should also bring in her recent scope?  I told her yes, anything that pertains to tomorrow's appt will help greatly.    Patient voiced understanding.    Thank you.

## 2021-11-19 ENCOUNTER — OFFICE VISIT (OUTPATIENT)
Dept: SURGERY | Facility: CLINIC | Age: 41
End: 2021-11-19

## 2021-11-19 VITALS
SYSTOLIC BLOOD PRESSURE: 120 MMHG | TEMPERATURE: 97.8 F | BODY MASS INDEX: 22.63 KG/M2 | HEART RATE: 100 BPM | WEIGHT: 140.8 LBS | OXYGEN SATURATION: 99 % | HEIGHT: 66 IN | DIASTOLIC BLOOD PRESSURE: 80 MMHG

## 2021-11-19 DIAGNOSIS — K57.20 DIVERTICULITIS OF LARGE INTESTINE WITH ABSCESS WITHOUT BLEEDING: Primary | ICD-10-CM

## 2021-11-19 PROBLEM — K57.92 DIVERTICULITIS: Status: ACTIVE | Noted: 2021-11-19

## 2021-11-19 PROCEDURE — 99244 OFF/OP CNSLTJ NEW/EST MOD 40: CPT | Performed by: COLON & RECTAL SURGERY

## 2021-11-19 RX ORDER — GABAPENTIN 300 MG/1
600 CAPSULE ORAL ONCE
Status: CANCELLED | OUTPATIENT
Start: 2022-01-18 | End: 2021-11-19

## 2021-11-19 RX ORDER — ALVIMOPAN 12 MG/1
12 CAPSULE ORAL ONCE
Status: CANCELLED | OUTPATIENT
Start: 2022-01-18 | End: 2021-11-19

## 2021-11-19 RX ORDER — CELECOXIB 100 MG/1
200 CAPSULE ORAL ONCE
Status: CANCELLED | OUTPATIENT
Start: 2022-01-18 | End: 2021-11-19

## 2021-11-19 RX ORDER — GABAPENTIN 100 MG/1
600 CAPSULE ORAL ONCE
Status: CANCELLED | OUTPATIENT
Start: 2022-01-18 | End: 2021-11-19

## 2021-11-19 RX ORDER — CLINDAMYCIN PHOSPHATE 900 MG/50ML
900 INJECTION INTRAVENOUS
Status: CANCELLED | OUTPATIENT
Start: 2022-01-18

## 2021-11-19 RX ORDER — SCOLOPAMINE TRANSDERMAL SYSTEM 1 MG/1
1 PATCH, EXTENDED RELEASE TRANSDERMAL CONTINUOUS
Status: CANCELLED | OUTPATIENT
Start: 2022-01-18 | End: 2022-01-21

## 2021-11-19 RX ORDER — ACETAMINOPHEN 500 MG
1000 TABLET ORAL ONCE
Status: CANCELLED | OUTPATIENT
Start: 2022-01-18 | End: 2021-11-19

## 2021-11-19 RX ORDER — CHLORHEXIDINE GLUCONATE 4 G/100ML
SOLUTION TOPICAL 2 TIMES DAILY
Qty: 236 ML | Refills: 0 | Status: SHIPPED | OUTPATIENT
Start: 2021-11-19 | End: 2022-02-17

## 2021-11-19 RX ORDER — PANTOPRAZOLE SODIUM 40 MG/1
40 TABLET, DELAYED RELEASE ORAL DAILY
COMMUNITY

## 2021-11-19 RX ORDER — NEOMYCIN SULFATE 500 MG/1
1000 TABLET ORAL TAKE AS DIRECTED
Qty: 3 TABLET | Refills: 0 | Status: ON HOLD | OUTPATIENT
Start: 2021-11-19 | End: 2022-03-01

## 2021-11-19 RX ORDER — METRONIDAZOLE 500 MG/1
500 TABLET ORAL SEE ADMIN INSTRUCTIONS
Qty: 3 TABLET | Refills: 0 | Status: ON HOLD | OUTPATIENT
Start: 2021-11-19 | End: 2022-03-01

## 2021-11-19 NOTE — PROGRESS NOTES
Marleny Palma is a 41 y.o. female who is seen as a consult at the request of Meredith Lea Kehrer, MD for Consult, Diverticulitis, Abdominal Pain, and Constipation.      HPI:  Complains of diverticulitis flares every 3 to 6 months since 2019  Has been hospitalized twice  Has had abscess once, in March 2020, underwent CT-guided drainage  Underwent colonoscopy in March 2021 which showed diverticulitis but no evidence of inflammatory bowel disease  During her last hospitalization, a surgeon saw her and offered to operate, but she is here for a second opinion  Her symptoms are unchanged at this time since the colonoscopy in March 2021  When she is having a flare, her abdominal pain is exacerbated with Valsalva  Reports reduced appetite over the last 6 months  Reports that her father had a ruptured diverticulum in his 30s or 40s  Complains of constipation since 2019 when these flares started, has worsened over time  takes miralax daily, which sometimes helps.  Often has to take correctol  in addition.  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   • Colitis 10/25/2021    SEEN AT [facility] ER   • Cystitis 04/2021   • Depression    • Diverticulitis 11/01/2021    ADMITTED TO [facility]   • Diverticulosis    • Dyspareunia in female 07/2014   • Dysuria 02/2021   • Elevated blood pressure reading without diagnosis of hypertension 06/2019   • Environmental allergies    • Fatigue 08/2021   • Geographic tongue    • Hemorrhagic cyst of ovary 10/2016   • Hiatal hernia 03/2021   • Hidradenitis axillaris    • Hyperlipidemia    •  Irritable bowel syndrome    • Levator spasm 10/2015   • LLQ pain 08/2014   • Mallet deformity of right middle finger 05/08/2016    SEEN AT    • Migraines    • MRSA infection 2011    BILATERAL AXILLA AND GROIN   • Muscle spasm of back 06/2019   • Nasal fracture 2009   • Nasal turbinate hypertrophy    • Outlet dysfunction constipation    • Panic attacks    • Perineocele 10/2015   • Rectocele 07/2014   • Scalp abscess 05/07/2016    SEEN AT    • Scoliosis    • Seasonal allergies    • Sigmoid diverticulitis 01/11/2021    SEEN AT [facility] ER   • Strain of shoulder, right 09/2020   • Stress incontinence of urine 07/2014   • Uterovaginal prolapse 08/2014       Past Surgical History:   Procedure Laterality Date   • ANTERIOR AND POSTERIOR VAGINAL REPAIR W/ SACROSPINOUS LIGAMENT SUSPENSION N/A 08/06/2014    DR. ROSELYN COLVIN AT [place]   • AXILLARY HIDRADENITIS EXCISION Bilateral 11/16/2011    DR. MARBIN CARY AT [facility]   • ENDOSCOPY AND COLONOSCOPY N/A 03/03/2021    3 CM HIATAL HERNIA, SCATTERED DIVERTICULA IN ENTIRE COLON, SMALL INTERNAL HEMORRHOIDS, DR. YASHIRA SHRESTHA AT [facility]   • HYSTERECTOMY N/A 08/06/2014    WITH RECTOCELE REPAIR, BSO, DR. ROSELYN COLVIN AT [place]   • INCISION AND DRAINAGE ABSCESS  09/04/2013    [facility]   • TONSILLECTOMY Bilateral 08/10/2015    DR. LIBRA MANZANO AT [facility]   • TRANSVAGINAL TAPING SUSPENSION N/A 08/06/2014    SLING, DR. ROSELYN COLVIN AT [place]   • VAGINAL DELIVERY N/A 02/03/2003    DR. ASHLEY PRICE AT [facility]   • VAGINAL DELIVERY N/A 05/13/2005    DR. AVRLI VOGEL AT [facility]   • VAGINAL DELIVERY N/A 09/28/2007    DR. ASHLEY PRICE AT [facility]   • WISDOM TOOTH EXTRACTION Bilateral        Social History:   reports that she has never smoked. She has never used smokeless tobacco. She reports current alcohol use. She reports that she does not use drugs.      Marriage status:     Family History   Problem Relation Age of Onset   • Colon polyps Father    • Hypertension Father    •  Arthritis Father    • Hyperlipidemia Father    • Arthritis Sister    • Depression Sister    • Hyperlipidemia Sister    • Hypertension Sister    • Depression Maternal Grandmother    • Hypertension Maternal Grandmother    • Kidney disease Maternal Grandmother    • Heart attack Maternal Grandmother    • Hypertension Maternal Grandfather    • Cancer Maternal Grandfather    • Skin cancer Maternal Grandfather    • Prostate cancer Maternal Grandfather    • Hypertension Paternal Grandmother    • Scoliosis Paternal Grandmother    • Thyroid disease Paternal Grandmother    • Hypertension Paternal Grandfather    • Stroke Paternal Grandfather    • Diabetes Paternal Grandfather    • Heart attack Paternal Grandfather          Current Outpatient Medications:   •  acetaminophen (TYLENOL) 325 MG tablet, Take 650 mg by mouth., Disp: , Rfl:   •  Chlorcyclizine-Pseudoephed (Stahist AD) 25-60 MG tablet, Take 1 tablet by mouth Daily., Disp: 90 tablet, Rfl: 0  •  chlorhexidine (HIBICLENS) 4 % external liquid, Apply  topically to the appropriate area as directed 2 (Two) Times a Day. Shower With Hibiclens Solution Twice The Day Before Surgery, Disp: 236 mL, Rfl: 0  •  cyclobenzaprine (FLEXERIL) 10 MG tablet, TAKE 1 TABLET BY MOUTH THREE TIMES DAILY AS NEEDED FOR MUSCLE SPASMS, Disp: 90 tablet, Rfl: 2  •  desvenlafaxine (Pristiq) 100 MG 24 hr tablet, Take 1 tablet by mouth Daily., Disp: 90 tablet, Rfl: 1  •  methylcellulose oral powder, Take  by mouth Daily., Disp: , Rfl:   •  ondansetron (ZOFRAN) 4 MG tablet, Take 4 mg by mouth Every 6 (Six) Hours As Needed., Disp: , Rfl:   •  pantoprazole (PROTONIX) 40 MG EC tablet, Take 40 mg by mouth Daily., Disp: , Rfl:   •  polyethylene glycol (MIRALAX) 17 GM/SCOOP powder, MIX 1 CAPFULL OF POWDER IN 8 OZ OF LIQUID AND DRINK 1 TO 2 TIMES DAILY, Disp: , Rfl:   •  metroNIDAZOLE (Flagyl) 500 MG tablet, Take 1 tablet by mouth See Admin Instructions for 3 doses. Take one tablet at 2 PM, one tablet at 3 PM,  and one tablet at 10 PM the day prior to surgery., Disp: 3 tablet, Rfl: 0  •  neomycin (MYCIFRADIN) 500 MG tablet, Take 2 tablets by mouth Take As Directed for 3 doses. Take two tablets at 2 PM, two tablets at 3 PM, and two tablets at 10 PM the day prior to surgery., Disp: 3 tablet, Rfl: 0    Allergy  Ceftin [cefuroxime axetil], Vancomycin, Oxycodone-acetaminophen, and Ultane [sevoflurane]    Review of Systems   Constitutional: Positive for decreased appetite. Negative for weight gain.   HENT: Negative for congestion, hearing loss and hoarse voice.    Eyes: Negative for blurred vision, discharge and visual disturbance.   Cardiovascular: Negative for chest pain, cyanosis and leg swelling.   Respiratory: Negative for cough, shortness of breath, sleep disturbances due to breathing and snoring.    Endocrine: Negative for cold intolerance and heat intolerance.   Hematologic/Lymphatic: Does not bruise/bleed easily.   Skin: Negative for itching, poor wound healing and skin cancer.   Musculoskeletal: Negative for arthritis, back pain, joint pain and joint swelling.   Gastrointestinal: Positive for abdominal pain and constipation. Negative for change in bowel habit and bowel incontinence.   Genitourinary: Negative for bladder incontinence, dysuria and hematuria.   Neurological: Negative for brief paralysis, excessive daytime sleepiness, dizziness, focal weakness, headaches, light-headedness and weakness.   Psychiatric/Behavioral: Negative for altered mental status and hallucinations. The patient does not have insomnia.    Allergic/Immunologic: Negative for HIV exposure and persistent infections.   All other systems reviewed and are negative.      Vitals:    11/19/21 0945   BP: 120/80   Pulse: 100   Temp: 97.8 °F (36.6 °C)   SpO2: 99%     Body mass index is 22.73 kg/m².    Physical Exam  Exam conducted with a chaperone present.   Constitutional:       General: She is not in acute distress.     Appearance: She is  well-developed.   HENT:      Head: Normocephalic and atraumatic.      Nose: Nose normal.   Eyes:      Conjunctiva/sclera: Conjunctivae normal.      Pupils: Pupils are equal, round, and reactive to light.   Neck:      Trachea: No tracheal deviation.   Pulmonary:      Effort: Pulmonary effort is normal. No respiratory distress.      Breath sounds: Normal breath sounds.   Abdominal:      General: Bowel sounds are normal. There is no distension.      Palpations: Abdomen is soft.   Musculoskeletal:         General: No deformity. Normal range of motion.      Cervical back: Normal range of motion.   Skin:     General: Skin is warm and dry.   Neurological:      Mental Status: She is alert and oriented to person, place, and time.      Cranial Nerves: No cranial nerve deficit.      Coordination: Coordination normal.      Gait: Gait normal.   Psychiatric:         Behavior: Behavior normal.         Judgment: Judgment normal.         Review of Medical Record:  I reviewed multiple CT abd/ pelvis reports and images from [facility], showing variations on inflammation of sigmoid and descending colon from November 1, 2021 July 27, 2021 April 19, 2020 April 3, 2020    Assessment:  1. Diverticulitis of large intestine with abscess without bleeding        Plan: Discussed with patient that she has had a complicated case and I would recommend a left and sigmoid colectomy.  Depending on blood supply it could end up being a total abdominal colectomy with ileorectal anastomosis.  I discussed this could be done laparoscopically and possible robot-assisted.  I described with patient typical surgical time, postop recovery including pain management, and restrictions. I discussed with patient risks, benefits, and alternatives.  The patient had opportunity to ask questions.  I answered all questions.  Patient understands and wishes to proceed with procedure.    Call if symptoms recur before scheduled procedure.  Recommend  high fiber diet  Schwab prep ordered      Scribed for Alejandro Johnston MD by Rosa Maria Samaniego PA-C. 11/28/2021  14:04 EST  This patient was evaluated by me, recommendations made, documentation reviewed, edited, and revised by me, Alejandro Johnston MD

## 2022-01-03 ENCOUNTER — TELEPHONE (OUTPATIENT)
Dept: SURGERY | Facility: CLINIC | Age: 42
End: 2022-01-03

## 2022-01-03 DIAGNOSIS — J30.1 SEASONAL ALLERGIC RHINITIS DUE TO POLLEN: ICD-10-CM

## 2022-01-03 NOTE — TELEPHONE ENCOUNTER
Patient called to reschedule her surgery from January 18th to March 1st.(Laparoscopic left and sigmoid colon resection).  She said she does not get paid while she is off and needs to save up some more money before she can take off to have it done.

## 2022-01-04 RX ORDER — CHLORCYCLIZINE HYDROCHLORIDE AND PSEUDOEPHEDRINE HYDROCHLORIDE 25; 60 MG/1; MG/1
1 TABLET ORAL DAILY
Qty: 90 TABLET | Refills: 0 | Status: SHIPPED | OUTPATIENT
Start: 2022-01-04 | End: 2022-03-23

## 2022-01-14 ENCOUNTER — APPOINTMENT (OUTPATIENT)
Dept: PREADMISSION TESTING | Facility: HOSPITAL | Age: 42
End: 2022-01-14

## 2022-01-30 DIAGNOSIS — S46.911A STRAIN OF RIGHT SHOULDER, INITIAL ENCOUNTER: ICD-10-CM

## 2022-01-31 RX ORDER — CYCLOBENZAPRINE HCL 10 MG
10 TABLET ORAL 3 TIMES DAILY PRN
Qty: 90 TABLET | Refills: 0 | Status: SHIPPED | OUTPATIENT
Start: 2022-01-31 | End: 2022-03-18 | Stop reason: SDUPTHER

## 2022-02-01 ENCOUNTER — OFFICE VISIT (OUTPATIENT)
Dept: FAMILY MEDICINE CLINIC | Facility: CLINIC | Age: 42
End: 2022-02-01

## 2022-02-01 VITALS
DIASTOLIC BLOOD PRESSURE: 80 MMHG | TEMPERATURE: 97.3 F | HEART RATE: 96 BPM | WEIGHT: 139.2 LBS | BODY MASS INDEX: 22.37 KG/M2 | SYSTOLIC BLOOD PRESSURE: 124 MMHG | HEIGHT: 66 IN | OXYGEN SATURATION: 100 %

## 2022-02-01 DIAGNOSIS — K52.9 GASTROENTERITIS: ICD-10-CM

## 2022-02-01 DIAGNOSIS — Z20.822 EXPOSURE TO COVID-19 VIRUS: Primary | ICD-10-CM

## 2022-02-01 PROCEDURE — 99213 OFFICE O/P EST LOW 20 MIN: CPT | Performed by: FAMILY MEDICINE

## 2022-02-01 RX ORDER — ONDANSETRON 4 MG/1
4 TABLET, ORALLY DISINTEGRATING ORAL EVERY 8 HOURS PRN
Qty: 15 TABLET | Refills: 0 | Status: SHIPPED | OUTPATIENT
Start: 2022-02-01 | End: 2022-02-12

## 2022-02-01 NOTE — PROGRESS NOTES
"Chief Complaint  exposed to covid, Fever, Cough, Diarrhea, and Vomiting    Subjective          Marleny Palma presents to [facility]  History of Present Illness     Patient verbalized consent to the visit recording.    The patient presents today after exposure to COVID. She developed symptoms of myalgias, headache, extreme fatigue, and vomiting since Sunday (1/30/2022), noting her son recent had COVID on 1/21/2022. He presented with a sore throat and headache, therefore she used a home COVID test, which was negative. This morning, she was able to tolerate 2 pieces of toast and has attempted to keep up with her fluid intake. Denies cough or decrease in urine output. Denies abdominal pain.     The patient was evaluated by Dr. Johnston, GI surgeon, and will undergo a lap sigmoidectomy.     Objective    Review of Systems  A review of systems was performed, and pertinent negatives are noted in the HPI.     Vital Signs:   /80   Pulse 96   Temp 97.3 °F (36.3 °C)   Ht 167.6 cm (66\")   Wt 63.1 kg (139 lb 3.2 oz)   SpO2 100%   BMI 22.47 kg/m²     Physical Exam  Constitutional:       General: She is not in acute distress.     Appearance: Normal appearance. She is well-developed.   HENT:      Head: Normocephalic and atraumatic.      Right Ear: Tympanic membrane, ear canal and external ear normal.      Left Ear: Tympanic membrane, ear canal and external ear normal.      Mouth/Throat:      Mouth: Mucous membranes are moist.      Pharynx: Oropharynx is clear.      Comments: Mild erythema of the posterior pharynx.  Eyes:      Conjunctiva/sclera: Conjunctivae normal.      Pupils: Pupils are equal, round, and reactive to light.   Neck:      Thyroid: No thyromegaly.   Cardiovascular:      Rate and Rhythm: Normal rate and regular rhythm.      Heart sounds: No murmur heard.      Pulmonary:      Effort: Pulmonary effort is normal.      Breath sounds: Normal breath sounds. No wheezing.   Abdominal:     "  General: Bowel sounds are normal.      Palpations: Abdomen is soft.      Tenderness: There is no abdominal tenderness.   Musculoskeletal:         General: Normal range of motion.      Cervical back: Neck supple.   Lymphadenopathy:      Cervical: No cervical adenopathy.   Skin:     General: Skin is warm and dry.      Comments: Capillary refill is brisk.   Neurological:      Mental Status: She is alert and oriented to person, place, and time.   Psychiatric:         Mood and Affect: Mood normal.         Behavior: Behavior normal.        Result Review :                 Assessment and Plan    Diagnoses and all orders for this visit:    1. Exposure to COVID-19 virus (Primary)        -     The patient was tested for COVID today and the results will be disclosed as soon as they are made available.   -     COVID-19,[facility] ROUTINE, NP/OP SWAB IN TRANSPORT MEDIA OR ESWAB 72 HR TAT - Swab, Nasopharynx; Future  -     COVID-19,[facility] ROUTINE, NP/OP SWAB IN TRANSPORT MEDIA OR ESWAB 72 HR TAT - Swab, Nasopharynx    2. Gastroenteritis        -     Recommend the patient push fluids and rest as much as possible.         -     Advised patient to take Ambien as needed to sleep.   -     ondansetron ODT (ZOFRAN-ODT) 4 MG disintegrating tablet; Place 1 tablet on the tongue Every 8 (Eight) Hours As Needed for Nausea or Vomiting.  Dispense: 15 tablet; Refill: 0        Follow Up   No follow-ups on file.  Patient was given instructions and counseling regarding her condition or for health maintenance advice. Please see specific information pulled into the AVS if appropriate.     Transcribed from ambient dictation for Meredith Lea Kehrer, MD by Jaime Jones.  02/01/22   19:28 EST

## 2022-02-02 LAB
LABCORP SARS-COV-2, NAA 2 DAY TAT: NORMAL
SARS-COV-2 RNA RESP QL NAA+PROBE: NOT DETECTED

## 2022-02-03 ENCOUNTER — DOCUMENTATION (OUTPATIENT)
Dept: FAMILY MEDICINE CLINIC | Facility: CLINIC | Age: 42
End: 2022-02-03

## 2022-02-12 ENCOUNTER — HOSPITAL ENCOUNTER (EMERGENCY)
Facility: HOSPITAL | Age: 42
Discharge: HOME | End: 2022-02-12
Attending: EMERGENCY MEDICINE | Admitting: EMERGENCY MEDICINE

## 2022-02-12 ENCOUNTER — APPOINTMENT (OUTPATIENT)
Dept: CT IMAGING | Facility: HOSPITAL | Age: 42
End: 2022-02-12

## 2022-02-12 VITALS
OXYGEN SATURATION: 97 % | HEART RATE: 86 BPM | DIASTOLIC BLOOD PRESSURE: 94 MMHG | RESPIRATION RATE: 16 BRPM | SYSTOLIC BLOOD PRESSURE: 141 MMHG | TEMPERATURE: 97.9 F

## 2022-02-12 DIAGNOSIS — K57.92 DIVERTICULITIS: Primary | ICD-10-CM

## 2022-02-12 LAB
ALBUMIN SERPL-MCNC: 4.6 G/DL (ref 3.5–5.2)
ALBUMIN/GLOB SERPL: 1.6 G/DL
ALP SERPL-CCNC: 93 U/L (ref 39–117)
ALT SERPL W P-5'-P-CCNC: 15 U/L (ref 1–33)
ANION GAP SERPL CALCULATED.3IONS-SCNC: 11 MMOL/L (ref 5–15)
AST SERPL-CCNC: 17 U/L (ref 1–32)
BACTERIA UR QL AUTO: NORMAL /HPF
BASOPHILS # BLD AUTO: 0.03 10*3/MM3 (ref 0–0.2)
BASOPHILS NFR BLD AUTO: 0.4 % (ref 0–1.5)
BILIRUB SERPL-MCNC: 0.2 MG/DL (ref 0–1.2)
BILIRUB UR QL STRIP: NEGATIVE
BUN SERPL-MCNC: 7 MG/DL (ref 6–20)
BUN/CREAT SERPL: 10.6 (ref 7–25)
CALCIUM SPEC-SCNC: 8.9 MG/DL (ref 8.6–10.5)
CHLORIDE SERPL-SCNC: 104 MMOL/L (ref 98–107)
CLARITY UR: CLEAR
CO2 SERPL-SCNC: 23 MMOL/L (ref 22–29)
COLOR UR: YELLOW
CREAT SERPL-MCNC: 0.66 MG/DL (ref 0.57–1)
DEPRECATED RDW RBC AUTO: 40.2 FL (ref 37–54)
EOSINOPHIL # BLD AUTO: 0.12 10*3/MM3 (ref 0–0.4)
EOSINOPHIL NFR BLD AUTO: 1.6 % (ref 0.3–6.2)
ERYTHROCYTE [DISTWIDTH] IN BLOOD BY AUTOMATED COUNT: 12.2 % (ref 12.3–15.4)
GFR SERPL CREATININE-BSD FRML MDRD: 99 ML/MIN/1.73
GLOBULIN UR ELPH-MCNC: 2.8 GM/DL
GLUCOSE SERPL-MCNC: 103 MG/DL (ref 65–99)
GLUCOSE UR STRIP-MCNC: NEGATIVE MG/DL
HCT VFR BLD AUTO: 35.2 % (ref 34–46.6)
HGB BLD-MCNC: 12 G/DL (ref 12–15.9)
HGB UR QL STRIP.AUTO: NEGATIVE
HYALINE CASTS UR QL AUTO: NORMAL /LPF
IMM GRANULOCYTES # BLD AUTO: 0.03 10*3/MM3 (ref 0–0.05)
IMM GRANULOCYTES NFR BLD AUTO: 0.4 % (ref 0–0.5)
KETONES UR QL STRIP: NEGATIVE
LEUKOCYTE ESTERASE UR QL STRIP.AUTO: ABNORMAL
LIPASE SERPL-CCNC: 29 U/L (ref 13–60)
LYMPHOCYTES # BLD AUTO: 1.28 10*3/MM3 (ref 0.7–3.1)
LYMPHOCYTES NFR BLD AUTO: 16.7 % (ref 19.6–45.3)
MCH RBC QN AUTO: 31 PG (ref 26.6–33)
MCHC RBC AUTO-ENTMCNC: 34.1 G/DL (ref 31.5–35.7)
MCV RBC AUTO: 91 FL (ref 79–97)
MONOCYTES # BLD AUTO: 0.53 10*3/MM3 (ref 0.1–0.9)
MONOCYTES NFR BLD AUTO: 6.9 % (ref 5–12)
NEUTROPHILS NFR BLD AUTO: 5.67 10*3/MM3 (ref 1.7–7)
NEUTROPHILS NFR BLD AUTO: 74 % (ref 42.7–76)
NITRITE UR QL STRIP: NEGATIVE
NRBC BLD AUTO-RTO: 0 /100 WBC (ref 0–0.2)
PH UR STRIP.AUTO: 6.5 [PH] (ref 5–8)
PLATELET # BLD AUTO: 351 10*3/MM3 (ref 140–450)
PMV BLD AUTO: 10.4 FL (ref 6–12)
POTASSIUM SERPL-SCNC: 3.2 MMOL/L (ref 3.5–5.2)
PROT SERPL-MCNC: 7.4 G/DL (ref 6–8.5)
PROT UR QL STRIP: NEGATIVE
RBC # BLD AUTO: 3.87 10*6/MM3 (ref 3.77–5.28)
RBC # UR STRIP: NORMAL /HPF
REF LAB TEST METHOD: NORMAL
SODIUM SERPL-SCNC: 138 MMOL/L (ref 136–145)
SP GR UR STRIP: <1.005 (ref 1–1.03)
SQUAMOUS #/AREA URNS HPF: NORMAL /HPF
UROBILINOGEN UR QL STRIP: ABNORMAL
WBC # UR STRIP: NORMAL /HPF
WBC NRBC COR # BLD: 7.66 10*3/MM3 (ref 3.4–10.8)

## 2022-02-12 PROCEDURE — 83690 ASSAY OF LIPASE: CPT | Performed by: EMERGENCY MEDICINE

## 2022-02-12 PROCEDURE — 25010000002 HYDROMORPHONE PER 4 MG: Performed by: EMERGENCY MEDICINE

## 2022-02-12 PROCEDURE — 25010000002 ONDANSETRON PER 1 MG: Performed by: EMERGENCY MEDICINE

## 2022-02-12 PROCEDURE — 81001 URINALYSIS AUTO W/SCOPE: CPT | Performed by: EMERGENCY MEDICINE

## 2022-02-12 PROCEDURE — 96375 TX/PRO/DX INJ NEW DRUG ADDON: CPT

## 2022-02-12 PROCEDURE — 96374 THER/PROPH/DIAG INJ IV PUSH: CPT

## 2022-02-12 PROCEDURE — 99284 EMERGENCY DEPT VISIT MOD MDM: CPT

## 2022-02-12 PROCEDURE — 80053 COMPREHEN METABOLIC PANEL: CPT | Performed by: EMERGENCY MEDICINE

## 2022-02-12 PROCEDURE — 74177 CT ABD & PELVIS W/CONTRAST: CPT

## 2022-02-12 PROCEDURE — 25010000002 IOPAMIDOL 61 % SOLUTION: Performed by: EMERGENCY MEDICINE

## 2022-02-12 PROCEDURE — 85025 COMPLETE CBC W/AUTO DIFF WBC: CPT | Performed by: EMERGENCY MEDICINE

## 2022-02-12 RX ORDER — HYDROCODONE BITARTRATE AND ACETAMINOPHEN 5; 325 MG/1; MG/1
1 TABLET ORAL EVERY 6 HOURS PRN
Qty: 12 TABLET | Refills: 0 | Status: SHIPPED | OUTPATIENT
Start: 2022-02-12 | End: 2022-02-17 | Stop reason: SDUPTHER

## 2022-02-12 RX ORDER — SODIUM CHLORIDE 0.9 % (FLUSH) 0.9 %
10 SYRINGE (ML) INJECTION AS NEEDED
Status: DISCONTINUED | OUTPATIENT
Start: 2022-02-12 | End: 2022-02-12 | Stop reason: HOSPADM

## 2022-02-12 RX ORDER — HYDROCODONE BITARTRATE AND ACETAMINOPHEN 5; 325 MG/1; MG/1
1 TABLET ORAL ONCE
Status: COMPLETED | OUTPATIENT
Start: 2022-02-12 | End: 2022-02-12

## 2022-02-12 RX ORDER — AMOXICILLIN AND CLAVULANATE POTASSIUM 875; 125 MG/1; MG/1
1 TABLET, FILM COATED ORAL ONCE
Status: COMPLETED | OUTPATIENT
Start: 2022-02-12 | End: 2022-02-12

## 2022-02-12 RX ORDER — ONDANSETRON 8 MG/1
8 TABLET, ORALLY DISINTEGRATING ORAL EVERY 8 HOURS PRN
Qty: 15 TABLET | Refills: 0 | Status: SHIPPED | OUTPATIENT
Start: 2022-02-12 | End: 2022-03-21

## 2022-02-12 RX ORDER — HYDROMORPHONE HYDROCHLORIDE 1 MG/ML
0.5 INJECTION, SOLUTION INTRAMUSCULAR; INTRAVENOUS; SUBCUTANEOUS ONCE
Status: COMPLETED | OUTPATIENT
Start: 2022-02-12 | End: 2022-02-12

## 2022-02-12 RX ORDER — AMOXICILLIN AND CLAVULANATE POTASSIUM 875; 125 MG/1; MG/1
1 TABLET, FILM COATED ORAL 2 TIMES DAILY
Qty: 20 TABLET | Refills: 0 | Status: SHIPPED | OUTPATIENT
Start: 2022-02-12 | End: 2022-02-22

## 2022-02-12 RX ORDER — ONDANSETRON 2 MG/ML
4 INJECTION INTRAMUSCULAR; INTRAVENOUS ONCE
Status: COMPLETED | OUTPATIENT
Start: 2022-02-12 | End: 2022-02-12

## 2022-02-12 RX ADMIN — ONDANSETRON HYDROCHLORIDE 4 MG: 2 SOLUTION INTRAMUSCULAR; INTRAVENOUS at 17:53

## 2022-02-12 RX ADMIN — IOPAMIDOL 85 ML: 612 INJECTION, SOLUTION INTRAVENOUS at 19:33

## 2022-02-12 RX ADMIN — AMOXICILLIN AND CLAVULANATE POTASSIUM 1 TABLET: 875; 125 TABLET, FILM COATED ORAL at 21:06

## 2022-02-12 RX ADMIN — SODIUM CHLORIDE 1000 ML: 9 INJECTION, SOLUTION INTRAVENOUS at 19:10

## 2022-02-12 RX ADMIN — HYDROMORPHONE HYDROCHLORIDE 0.5 MG: 1 INJECTION, SOLUTION INTRAMUSCULAR; INTRAVENOUS; SUBCUTANEOUS at 17:54

## 2022-02-12 RX ADMIN — HYDROCODONE BITARTRATE AND ACETAMINOPHEN 1 TABLET: 5; 325 TABLET ORAL at 21:06

## 2022-02-12 NOTE — ED NOTES
Pt to ED from home with c/o LLQ pain x 3 days.  Pt reports having surgery scheduled 3/1 for colon resection r/t diverticulitis and advised to come to ED by MD Johnston. Pt also c/o nausea, denies v/d.    Pt wearing mask, staff wearing appropriate PPE.     Yuridia Rucker, RN  02/12/22 [id number]

## 2022-02-12 NOTE — ED PROVIDER NOTES
EMERGENCY DEPARTMENT ENCOUNTER  I wore full protective equipment throughout this patient encounter including a N95 mask, eye shield, gown and gloves. Hand hygiene was performed before donning protective equipment and after removal when leaving the room.    Room Number:  19/19  Date of encounter:  2/13/2022  PCP: Kehrer, Meredith Lea, MD    HPI:  Context: Marleny Palma is a 41 y.o. female who presents to the ED c/o chief complaint of left lower quadrant abdominal pain since Wednesday.  Patient states she has a history of recurrent diverticulitis and this feels similar.  Over the past 1 to 2 days, it has become worse with movement and walking.  She admits to history of tenesmus during this episode.  Her bowel movements have changed in which she is now starting to pass some mucus and had some blood-tinged stool yesterday.  She states she has been seen by Dr. Johnston and is scheduled for a left hemicolectomy on March 1, 2022.  She denies gas with urination or stool in her urine or vagina.  She states she is status post hysterectomy.    MEDICAL HISTORY REVIEW  Reviewed in [facility].  Patient was seen by Dr. Johnston on November 19, 2021.  She has a history of recurrent diverticulitis every 3 to 6 months since 2019.  She is also had a history of a diverticular abscess, status post CT-guided drainage in March 2020.    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   • Colitis 10/25/2021   •  Cystitis 04/2021   • Diverticulosis    • Dyspareunia in female 07/2014   • Dysuria 02/2021   • Elevated blood pressure reading without diagnosis of hypertension 06/2019   • Environmental allergies    • Fatigue 08/2021   • Geographic tongue    • Hemorrhagic cyst of ovary 10/2016   • Hiatal hernia 03/2021   • Hidradenitis axillaris    • Hyperlipidemia    • Irritable bowel syndrome    • Levator spasm 10/2015   • LLQ pain 08/2014   • Mallet deformity of right middle finger 05/08/2016   • Migraines    • MRSA infection 2011   • Muscle spasm of back 06/2019   • Nasal fracture 2009   • Nasal turbinate hypertrophy    • Outlet dysfunction constipation    • Panic attacks    • Perineocele 10/2015   • Rectocele 07/2014   • Scalp abscess 05/07/2016   • Scoliosis    • Seasonal allergies    • Sigmoid diverticulitis 01/11/2021   • Strain of shoulder, right 09/2020   • Stress incontinence of urine 07/2014   • Uterovaginal prolapse 08/2014       PAST SURGICAL HISTORY  Past Surgical History:   Procedure Laterality Date   • ANTERIOR AND POSTERIOR VAGINAL REPAIR W/ SACROSPINOUS LIGAMENT SUSPENSION N/A 08/06/2014    DR. ROSELYN COLVIN AT [place]   • AXILLARY HIDRADENITIS EXCISION Bilateral 11/16/2011    DR. MARBIN CARY AT [facility]   • ENDOSCOPY AND COLONOSCOPY N/A 03/03/2021    3 CM HIATAL HERNIA, SCATTERED DIVERTICULA IN ENTIRE COLON, SMALL INTERNAL HEMORRHOIDS, DR. YASHIRA SHRESTHA AT [facility]   • HYSTERECTOMY N/A 08/06/2014    WITH RECTOCELE REPAIR, BSO, DR. ROSELYN COLVIN AT [place]   • INCISION AND DRAINAGE ABSCESS  09/04/2013    [facility]   • TONSILLECTOMY Bilateral 08/10/2015    DR. LIBRA MANZANO AT [facility]   • TRANSVAGINAL TAPING SUSPENSION N/A 08/06/2014    SLING, DR. ROSELYN COLVIN AT [place]   • VAGINAL DELIVERY N/A 02/03/2003    DR. ASHLEY PRICE AT [facility]   • VAGINAL DELIVERY N/A 05/13/2005    DR. AVRIL VOGEL AT [facility]   • VAGINAL DELIVERY N/A 09/28/2007    DR. ASHLEY PRICE AT [facility]   • WISDOM TOOTH EXTRACTION Bilateral        FAMILY  HISTORY  Family History   Problem Relation Age of Onset   • Colon polyps Father    • Hypertension Father    • Arthritis Father    • Hyperlipidemia Father    • Arthritis Sister    • Depression Sister    • Hyperlipidemia Sister    • Hypertension Sister    • Depression Maternal Grandmother    • Hypertension Maternal Grandmother    • Kidney disease Maternal Grandmother    • Heart attack Maternal Grandmother    • Hypertension Maternal Grandfather    • Cancer Maternal Grandfather    • Skin cancer Maternal Grandfather    • Prostate cancer Maternal Grandfather    • Hypertension Paternal Grandmother    • Scoliosis Paternal Grandmother    • Thyroid disease Paternal Grandmother    • Hypertension Paternal Grandfather    • Stroke Paternal Grandfather    • Diabetes Paternal Grandfather    • Heart attack Paternal Grandfather        SOCIAL HISTORY  Social History     Socioeconomic History   • Marital status:    Tobacco Use   • Smoking status: Never Smoker   • Smokeless tobacco: Never Used   Vaping Use   • Vaping Use: Never used   Substance and Sexual Activity   • Alcohol use: Yes     Comment: Rarely.   • Drug use: Never   • Sexual activity: Yes     Partners: Male     Birth control/protection: Surgical     Comment: .       ALLERGIES  Ceftin [cefuroxime axetil], Vancomycin, Oxycodone-acetaminophen, and Ultane [sevoflurane]    The patient's allergies have been reviewed    REVIEW OF SYSTEMS  All systems reviewed and negative except for those discussed in HPI.     PHYSICAL EXAM  I have reviewed the triage vital signs and nursing notes.  ED Triage Vitals [02/12/22 1722]   Temp Heart Rate Resp BP SpO2   97.9 °F (36.6 °C) 102 16 -- 96 %      Temp src Heart Rate Source Patient Position BP Location FiO2 (%)   Tympanic -- -- -- --       General: Mild distress.  HENT: NCAT, PERRL, Nares patent.  Eyes: no scleral icterus.  Neck: trachea midline, no ROM limitations.  CV: regular rhythm, regular rate.  Respiratory: normal effort,  CTAB.  Abdomen: NABS, soft, mild left lower quadrant abdominal tenderness without rebound or CVA tenderness.  Musculoskeletal: no deformity.  Neuro: alert, moves all extremities, follows commands.  Skin: warm, dry.    LAB RESULTS  Recent Results (from the past 24 hour(s))   Urinalysis With Microscopic If Indicated (No Culture) - Urine, Clean Catch    Collection Time: 02/12/22  5:44 PM    Specimen: Urine, Clean Catch   Result Value Ref Range    Color, UA Yellow Yellow, Straw    Appearance, UA Clear Clear    pH, UA 6.5 5.0 - 8.0    Specific Gravity, UA <1.005 (L) 1.005 - 1.030    Glucose, UA Negative Negative    Ketones, UA Negative Negative    Bilirubin, UA Negative Negative    Blood, UA Negative Negative    Protein, UA Negative Negative    Leuk Esterase, UA Trace (A) Negative    Nitrite, UA Negative Negative    Urobilinogen, UA 0.2 E.U./dL 0.2 - 1.0 E.U./dL   Urinalysis, Microscopic Only - Urine, Clean Catch    Collection Time: 02/12/22  5:44 PM    Specimen: Urine, Clean Catch   Result Value Ref Range    RBC, UA None Seen None Seen, 0-2 /HPF    WBC, UA 0-2 None Seen, 0-2 /HPF    Bacteria, UA None Seen None Seen /HPF    Squamous Epithelial Cells, UA 0-2 None Seen, 0-2 /HPF    Hyaline Casts, UA None Seen None Seen /LPF    Methodology Manual Light Microscopy    Comprehensive Metabolic Panel    Collection Time: 02/12/22  5:45 PM    Specimen: Blood   Result Value Ref Range    Glucose 103 (H) 65 - 99 mg/dL    BUN 7 6 - 20 mg/dL    Creatinine 0.66 0.57 - 1.00 mg/dL    Sodium 138 136 - 145 mmol/L    Potassium 3.2 (L) 3.5 - 5.2 mmol/L    Chloride 104 98 - 107 mmol/L    CO2 23.0 22.0 - 29.0 mmol/L    Calcium 8.9 8.6 - 10.5 mg/dL    Total Protein 7.4 6.0 - 8.5 g/dL    Albumin 4.60 3.50 - 5.20 g/dL    ALT (SGPT) 15 1 - 33 U/L    AST (SGOT) 17 1 - 32 U/L    Alkaline Phosphatase 93 39 - 117 U/L    Total Bilirubin 0.2 0.0 - 1.2 mg/dL    eGFR Non African Amer 99 >60 mL/min/1.73    Globulin 2.8 gm/dL    A/G Ratio 1.6 g/dL     BUN/Creatinine Ratio 10.6 7.0 - 25.0    Anion Gap 11.0 5.0 - 15.0 mmol/L   Lipase    Collection Time: 02/12/22  5:45 PM    Specimen: Blood   Result Value Ref Range    Lipase 29 13 - 60 U/L   CBC Auto Differential    Collection Time: 02/12/22  5:45 PM    Specimen: Blood   Result Value Ref Range    WBC 7.66 3.40 - 10.80 10*3/mm3    RBC 3.87 3.77 - 5.28 10*6/mm3    Hemoglobin 12.0 12.0 - 15.9 g/dL    Hematocrit 35.2 34.0 - 46.6 %    MCV 91.0 79.0 - 97.0 fL    MCH 31.0 26.6 - 33.0 pg    MCHC 34.1 31.5 - 35.7 g/dL    RDW 12.2 (L) 12.3 - 15.4 %    RDW-SD 40.2 37.0 - 54.0 fl    MPV 10.4 6.0 - 12.0 fL    Platelets 351 140 - 450 10*3/mm3    Neutrophil % 74.0 42.7 - 76.0 %    Lymphocyte % 16.7 (L) 19.6 - 45.3 %    Monocyte % 6.9 5.0 - 12.0 %    Eosinophil % 1.6 0.3 - 6.2 %    Basophil % 0.4 0.0 - 1.5 %    Immature Grans % 0.4 0.0 - 0.5 %    Neutrophils, Absolute 5.67 1.70 - 7.00 10*3/mm3    Lymphocytes, Absolute 1.28 0.70 - 3.10 10*3/mm3    Monocytes, Absolute 0.53 0.10 - 0.90 10*3/mm3    Eosinophils, Absolute 0.12 0.00 - 0.40 10*3/mm3    Basophils, Absolute 0.03 0.00 - 0.20 10*3/mm3    Immature Grans, Absolute 0.03 0.00 - 0.05 10*3/mm3    nRBC 0.0 0.0 - 0.2 /100 WBC       I ordered the above labs and reviewed the results.    RADIOLOGY  CT Abdomen Pelvis With Contrast    Result Date: 2/12/2022  CT ABDOMEN AND PELVIS WITH IV CONTRAST  HISTORY: Diverticulitis  TECHNIQUE: Radiation dose reduction techniques were utilized, including automated exposure control and exposure modulation based on body size. 3 mm images were obtained through the abdomen and pelvis after the administration of IV contrast.  COMPARISON: None  FINDINGS:  There are no findings of small bowel obstruction. No abdominopelvic adenopathy by size criteria is present. Colonic diverticulosis present. There is a short segment of wall thickening with adjacent fat stranding involving the distal descending and proximal sigmoid colon. The appendix is unremarkable. Small  amount of free fluid is present in the pelvis. No free intraperitoneal air is seen. No loculated pericolonic fluid collection is seen. The uterus is surgically absent.  No suspicious lytic or blastic osseous lesions present. The liver, gallbladder, pancreas, spleen and adrenal glands have an unremarkable postcontrast CT appearance. Subcentimeter renal lesions are too small to characterize. There is no hydronephrosis.      1.  Moderate diverticulitis involving the distal descending and proximal sigmoid colon without findings of free air or loculated pericolonic fluid collection at this time. 2.  Other findings as above.  This report was finalized on 2/12/2022 8:01 PM by Dr. Walker Ochoa M.D.        I ordered the above noted radiological studies. I reviewed the images and results. I agree with the radiologist interpretation.    PROCEDURES  Procedures    MEDICATIONS GIVEN IN ER  Medications   sodium chloride 0.9 % bolus 1,000 mL (0 mL Intravenous Stopped 2/12/22 2107)   HYDROmorphone (DILAUDID) injection 0.5 mg (0.5 mg Intravenous Given 2/12/22 1754)   ondansetron (ZOFRAN) injection 4 mg (4 mg Intravenous Given 2/12/22 1753)   iopamidol (ISOVUE-300) 61 % injection 100 mL (85 mL Intravenous Given 2/12/22 1933)   amoxicillin-clavulanate (AUGMENTIN) 875-125 MG per tablet 1 tablet (1 tablet Oral Given 2/12/22 2106)   HYDROcodone-acetaminophen (NORCO) 5-325 MG per tablet 1 tablet (1 tablet Oral Given 2/12/22 2106)       PROGRESS, DATA ANALYSIS, CONSULTS, AND MEDICAL DECISION MAKING  A complete history and physical exam have been performed.  All available laboratory and imaging results have been reviewed by myself prior to disposition.    MDM  After the initial H&P, I discussed pertinent information from history and physical exam with patient/family.  Discussed differential diagnosis.  Discussed plan for ED evaluation/workup/treatment.  All questions answered.  Patient/family is agreeable with plan.  ED Course as of  02/13/22 1037   Sat Feb 12, 2022 1738 Patient presents with increasing left lower quadrant abdominal pain with a history of recurrent diverticulitis and is scheduled for a left hemicolectomy for the diverticulitis.  We will check basic blood work and a CT abdomen pelvis.  Patient states she would like something for the pain.  We will offer pain control and IV fluids. [DE]   1807 WBC: 7.66 [DE]   1807 Hemoglobin: 12.0 [DE]   1916 Potassium(!): 3.2 [DE]   2026 CT imaging significant for mild section of acute diverticulitis, no abscess or free air seen.  Updating patient on imaging results, consulting with patient's surgeon. [JG]   2029 Patient reassessed, reports pain is improved.  Discussed imaging results, discussed plans for consult with patient surgeon.  Patient reports that she feels better and would prefer to go home if cleared to by surgeon.  Patient is agreeable for surgery consult, no questions or concerns at present. [JG]   2045 Phone call with Dr. Marielle Cotto, general surgery covering for Dr. Johnston.  Discussed the patient, relevant history, exam, diagnostics, ED findings/progress, and concerns.  She recommends discharge on Augmentin with close outpatient follow-up.  [JG]   2045 COLE query complete. Treatment plan to include limited course of prescribed  controlled substance. Risks including addiction, benefits, and alternatives presented to patient.    [JG]   2047 Patient has listed allergy to cephalosporins but reports that she has taken Augmentin in the past without difficulty, proceeding with Augmentin. [JG]      ED Course User Index  [DE] Geremias Sepulveda MD  [JG] Vishal Farias MD       AS OF 10:37 EST VITALS:    BP - 141/94  HR - 86  TEMP - 97.9 °F (36.6 °C) (Tympanic)  O2 SATS - 97%    DIAGNOSIS  Final diagnoses:   Diverticulitis         DISPOSITION    DISCHARGE    Patient discharged in stable condition.    Reviewed implications of results, diagnosis, meds, responsibility to follow up,  warning signs and symptoms of possible worsening, potential complications and reasons to return to ER.    Patient/Family voiced understanding of above instructions.    Discussed plan for discharge, as there is no emergent indication for admission. Patient referred to primary care provider for BP management due to today's BP. Pt/family is agreeable and understands need for follow up and repeat testing.  Pt is aware that discharge does not mean that nothing is wrong but it indicates no emergency is present that requires admission and they must continue care with follow-up as given below or physician of their choice.     FOLLOW-UP  Kehrer, Meredith Lea, MD  [address] [facility]  [address]  [facility] [id number] [phone]    Schedule an appointment as soon as possible for a visit in 2 days      Alejandro Johnston MD  [address]  [place] [id number] [phone]    Schedule an appointment as soon as possible for a visit in 2 days           Medication List      New Prescriptions    amoxicillin-clavulanate 875-125 MG per tablet  Commonly known as: AUGMENTIN  Take 1 tablet by mouth 2 (Two) Times a Day for 10 days.     HYDROcodone-acetaminophen 5-325 MG per tablet  Commonly known as: NORCO  Take 1 tablet by mouth Every 6 (Six) Hours As Needed for Moderate Pain .     ondansetron ODT 8 MG disintegrating tablet  Commonly known as: ZOFRAN-ODT  Place 1 tablet on the tongue Every 8 (Eight) Hours As Needed for Nausea or Vomiting.           Where to Get Your Medications      These medications were sent to [id number] - [place], [place]  [address] [place] [address] AT [address] OF [place] [address] - [phone] [facility] [phone]   [address] [facility] [address]    Phone: [phone]   · amoxicillin-clavulanate 875-125 MG per tablet  · HYDROcodone-acetaminophen 5-325 MG per tablet  · ondansetron ODT 8 MG disintegrating tablet          Geremias Sepulveda MD  02/13/22 1037

## 2022-02-13 NOTE — ED PROVIDER NOTES
EMERGENCY DEPARTMENT ENCOUNTER    Transfer of Care Note    HPI:  41-year-old female with history of recurrent diverticulitis followed by Dr. Johnston currently scheduled for outpatient left hemicolectomy presents with low for lower quadrant pain with concern for diverticulitis.  Lab work unremarkable, patient currently pending CT imaging.    LAB RESULTS  Recent Results (from the past 24 hour(s))   Urinalysis With Microscopic If Indicated (No Culture) - Urine, Clean Catch    Collection Time: 02/12/22  5:44 PM    Specimen: Urine, Clean Catch   Result Value Ref Range    Color, UA Yellow Yellow, Straw    Appearance, UA Clear Clear    pH, UA 6.5 5.0 - 8.0    Specific Gravity, UA <1.005 (L) 1.005 - 1.030    Glucose, UA Negative Negative    Ketones, UA Negative Negative    Bilirubin, UA Negative Negative    Blood, UA Negative Negative    Protein, UA Negative Negative    Leuk Esterase, UA Trace (A) Negative    Nitrite, UA Negative Negative    Urobilinogen, UA 0.2 E.U./dL 0.2 - 1.0 E.U./dL   Urinalysis, Microscopic Only - Urine, Clean Catch    Collection Time: 02/12/22  5:44 PM    Specimen: Urine, Clean Catch   Result Value Ref Range    RBC, UA None Seen None Seen, 0-2 /HPF    WBC, UA 0-2 None Seen, 0-2 /HPF    Bacteria, UA None Seen None Seen /HPF    Squamous Epithelial Cells, UA 0-2 None Seen, 0-2 /HPF    Hyaline Casts, UA None Seen None Seen /LPF    Methodology Manual Light Microscopy    Comprehensive Metabolic Panel    Collection Time: 02/12/22  5:45 PM    Specimen: Blood   Result Value Ref Range    Glucose 103 (H) 65 - 99 mg/dL    BUN 7 6 - 20 mg/dL    Creatinine 0.66 0.57 - 1.00 mg/dL    Sodium 138 136 - 145 mmol/L    Potassium 3.2 (L) 3.5 - 5.2 mmol/L    Chloride 104 98 - 107 mmol/L    CO2 23.0 22.0 - 29.0 mmol/L    Calcium 8.9 8.6 - 10.5 mg/dL    Total Protein 7.4 6.0 - 8.5 g/dL    Albumin 4.60 3.50 - 5.20 g/dL    ALT (SGPT) 15 1 - 33 U/L    AST (SGOT) 17 1 - 32 U/L    Alkaline Phosphatase 93 39 - 117 U/L    Total  Bilirubin 0.2 0.0 - 1.2 mg/dL    eGFR Non African Amer 99 >60 mL/min/1.73    Globulin 2.8 gm/dL    A/G Ratio 1.6 g/dL    BUN/Creatinine Ratio 10.6 7.0 - 25.0    Anion Gap 11.0 5.0 - 15.0 mmol/L   Lipase    Collection Time: 02/12/22  5:45 PM    Specimen: Blood   Result Value Ref Range    Lipase 29 13 - 60 U/L   CBC Auto Differential    Collection Time: 02/12/22  5:45 PM    Specimen: Blood   Result Value Ref Range    WBC 7.66 3.40 - 10.80 10*3/mm3    RBC 3.87 3.77 - 5.28 10*6/mm3    Hemoglobin 12.0 12.0 - 15.9 g/dL    Hematocrit 35.2 34.0 - 46.6 %    MCV 91.0 79.0 - 97.0 fL    MCH 31.0 26.6 - 33.0 pg    MCHC 34.1 31.5 - 35.7 g/dL    RDW 12.2 (L) 12.3 - 15.4 %    RDW-SD 40.2 37.0 - 54.0 fl    MPV 10.4 6.0 - 12.0 fL    Platelets 351 140 - 450 10*3/mm3    Neutrophil % 74.0 42.7 - 76.0 %    Lymphocyte % 16.7 (L) 19.6 - 45.3 %    Monocyte % 6.9 5.0 - 12.0 %    Eosinophil % 1.6 0.3 - 6.2 %    Basophil % 0.4 0.0 - 1.5 %    Immature Grans % 0.4 0.0 - 0.5 %    Neutrophils, Absolute 5.67 1.70 - 7.00 10*3/mm3    Lymphocytes, Absolute 1.28 0.70 - 3.10 10*3/mm3    Monocytes, Absolute 0.53 0.10 - 0.90 10*3/mm3    Eosinophils, Absolute 0.12 0.00 - 0.40 10*3/mm3    Basophils, Absolute 0.03 0.00 - 0.20 10*3/mm3    Immature Grans, Absolute 0.03 0.00 - 0.05 10*3/mm3    nRBC 0.0 0.0 - 0.2 /100 WBC       I ordered the above labs and reviewed the results.    RADIOLOGY  CT Abdomen Pelvis With Contrast    Result Date: 2/12/2022  CT ABDOMEN AND PELVIS WITH IV CONTRAST  HISTORY: Diverticulitis  TECHNIQUE: Radiation dose reduction techniques were utilized, including automated exposure control and exposure modulation based on body size. 3 mm images were obtained through the abdomen and pelvis after the administration of IV contrast.  COMPARISON: None  FINDINGS:  There are no findings of small bowel obstruction. No abdominopelvic adenopathy by size criteria is present. Colonic diverticulosis present. There is a short segment of wall thickening  with adjacent fat stranding involving the distal descending and proximal sigmoid colon. The appendix is unremarkable. Small amount of free fluid is present in the pelvis. No free intraperitoneal air is seen. No loculated pericolonic fluid collection is seen. The uterus is surgically absent.  No suspicious lytic or blastic osseous lesions present. The liver, gallbladder, pancreas, spleen and adrenal glands have an unremarkable postcontrast CT appearance. Subcentimeter renal lesions are too small to characterize. There is no hydronephrosis.      1.  Moderate diverticulitis involving the distal descending and proximal sigmoid colon without findings of free air or loculated pericolonic fluid collection at this time. 2.  Other findings as above.  This report was finalized on 2/12/2022 8:01 PM by Dr. Walker Ochoa M.D.        I ordered the above noted radiological studies. I reviewed the images and results. I agree with the radiologist interpretation.    PROCEDURES  Procedures    MEDICATIONS GIVEN IN ER  Medications   sodium chloride 0.9 % flush 10 mL (has no administration in time range)   amoxicillin-clavulanate (AUGMENTIN) 875-125 MG per tablet 1 tablet (has no administration in time range)   HYDROcodone-acetaminophen (NORCO) 5-325 MG per tablet 1 tablet (has no administration in time range)   sodium chloride 0.9 % bolus 1,000 mL (1,000 mL Intravenous New Bag 2/12/22 1910)   HYDROmorphone (DILAUDID) injection 0.5 mg (0.5 mg Intravenous Given 2/12/22 1754)   ondansetron (ZOFRAN) injection 4 mg (4 mg Intravenous Given 2/12/22 1753)   iopamidol (ISOVUE-300) 61 % injection 100 mL (85 mL Intravenous Given 2/12/22 1933)       PROGRESS, DATA ANALYSIS, CONSULTS, AND MEDICAL DECISION MAKING  A complete history and physical exam have been performed.  All available laboratory and imaging results have been reviewed by myself prior to disposition.  Face mask and gloves were worn throughout the patient encounter, unless additional  PPE was worn and specified below. Hand hygiene was performed before entering and after leaving the patient room.  [facility]    ED Course as of 02/12/22 2052   Sat Feb 12, 2022 1738 Patient presents with increasing left lower quadrant abdominal pain with a history of recurrent diverticulitis and is scheduled for a left hemicolectomy for the diverticulitis.  We will check basic blood work and a CT abdomen pelvis.  Patient states she would like something for the pain.  We will offer pain control and IV fluids. [DE]   1807 WBC: 7.66 [DE]   1807 Hemoglobin: 12.0 [DE]   1916 Potassium(!): 3.2 [DE]   2026 CT imaging significant for mild section of acute diverticulitis, no abscess or free air seen.  Updating patient on imaging results, consulting with patient's surgeon. [JG]   2029 Patient reassessed, reports pain is improved.  Discussed imaging results, discussed plans for consult with patient surgeon.  Patient reports that she feels better and would prefer to go home if cleared to by surgeon.  Patient is agreeable for surgery consult, no questions or concerns at present. [JG]   2045 Phone call with Dr. Marielle Cotto, general surgery covering for Dr. Johnston.  Discussed the patient, relevant history, exam, diagnostics, ED findings/progress, and concerns.  She recommends discharge on Augmentin with close outpatient follow-up.  [JG]   2045 COLE query complete. Treatment plan to include limited course of prescribed  controlled substance. Risks including addiction, benefits, and alternatives presented to patient.    [JG]   2047 Patient has listed allergy to cephalosporins but reports that she has taken Augmentin in the past without difficulty, proceeding with Augmentin. [JG]      ED Course User Index  [DE] Geremias Sepulveda MD  [JG] Vishal Farias MD       AS OF 20:52 EST VITALS:    BP - 141/94  HR - 86  TEMP - 97.9 °F (36.6 °C) (Tympanic)  O2 SATS - 97%    DIAGNOSIS  Final diagnoses:   Diverticulitis          DISPOSITION  DISCHARGE    Patient discharged in stable condition.    Reviewed implications of results, diagnosis, meds, responsibility to follow up, warning signs and symptoms of possible worsening, potential complications and reasons to return to ER.    Patient/Family voiced understanding of above instructions.    Discussed plan for discharge, as there is no emergent indication for admission. Patient referred to primary care provider for BP management due to today's BP. Pt/family is agreeable and understands need for follow up and repeat testing.  Pt is aware that discharge does not mean that nothing is wrong but it indicates no emergency is present that requires admission and they must continue care with follow-up as given below or physician of their choice.     FOLLOW-UP  Kehrer, Meredith Lea, MD  [address]  [address]  [facility] [id number] [phone]    Schedule an appointment as soon as possible for a visit in 2 days      Alejandro Johnston MD  [address]  [facility] [address]  [phone]    Schedule an appointment as soon as possible for a visit in 2 days           Medication List      New Prescriptions    amoxicillin-clavulanate 875-125 MG per tablet  Commonly known as: AUGMENTIN  Take 1 tablet by mouth 2 (Two) Times a Day for 10 days.     HYDROcodone-acetaminophen 5-325 MG per tablet  Commonly known as: NORCO  Take 1 tablet by mouth Every 6 (Six) Hours As Needed for Moderate Pain .     ondansetron ODT 8 MG disintegrating tablet  Commonly known as: ZOFRAN-ODT  Place 1 tablet on the tongue Every 8 (Eight) Hours As Needed for Nausea or Vomiting.           Where to Get Your Medications      These medications were sent to [id number] - [place], [place] - [address] - [phone]  - [phone] FX  [address] [place], [facility] [address]    Phone: [phone]   · amoxicillin-clavulanate 875-125 MG per  tablet  · HYDROcodone-acetaminophen 5-325 MG per tablet  · ondansetron ODT 8 MG disintegrating tablet          Vishal Farias MD  02/12/22 2052

## 2022-02-13 NOTE — ED NOTES
Patient was wearing a face mask throughout our encounter.  This RN wore appropriate PPE throughout the encounter.  Hand hygiene was performed before and after patient encounter.        Carmen Dhillon RN  02/12/22 [id number]

## 2022-02-14 ENCOUNTER — TELEPHONE (OUTPATIENT)
Dept: FAMILY MEDICINE CLINIC | Facility: CLINIC | Age: 42
End: 2022-02-14

## 2022-02-14 NOTE — TELEPHONE ENCOUNTER
----- Message from Karyn Gabriel RN sent at 2/14/2022  8:08 AM EST -----  Regarding: Ed  Pt seen in ED 2/12.    Thank you,  Karyn Gabriel RN, BSN, [facility]  Ambulatory Care Manager  [phone]

## 2022-02-17 ENCOUNTER — OFFICE VISIT (OUTPATIENT)
Dept: FAMILY MEDICINE CLINIC | Facility: CLINIC | Age: 42
End: 2022-02-17

## 2022-02-17 VITALS
SYSTOLIC BLOOD PRESSURE: 140 MMHG | HEART RATE: 105 BPM | WEIGHT: 144.6 LBS | DIASTOLIC BLOOD PRESSURE: 82 MMHG | BODY MASS INDEX: 23.24 KG/M2 | OXYGEN SATURATION: 98 % | HEIGHT: 66 IN | TEMPERATURE: 98.4 F

## 2022-02-17 DIAGNOSIS — K57.92 DIVERTICULITIS: Primary | ICD-10-CM

## 2022-02-17 PROCEDURE — 99214 OFFICE O/P EST MOD 30 MIN: CPT | Performed by: FAMILY MEDICINE

## 2022-02-17 RX ORDER — HYDROCODONE BITARTRATE AND ACETAMINOPHEN 5; 325 MG/1; MG/1
1 TABLET ORAL EVERY 6 HOURS PRN
Qty: 12 TABLET | Refills: 0 | Status: SHIPPED | OUTPATIENT
Start: 2022-02-17 | End: 2022-03-03 | Stop reason: HOSPADM

## 2022-02-17 RX ORDER — IBUPROFEN 800 MG/1
800 TABLET ORAL EVERY 6 HOURS PRN
COMMUNITY

## 2022-02-17 NOTE — PROGRESS NOTES
"Chief Complaint  Hospital Follow Up Visit (er e 02/13)    Subjective          Marleny Palma presents to [facility]  Patient of Dr. Kehrer is here today for follow up from a recent ER visit for an acute flare of diverticulitis.  The patient has struggled with diverticulosis and flares of diverticulitis for the past 3 years.  She has seen a colorectal surgeon who suggested she undergo surgery and she has a date scheduled in March for the surgery but had a flare which took her to the ER last week.  Patient has an intolerance to Cipro and Flagyl so she was started on Augmentin 875 twice daily for 10 days.  She is doing well on the antibiotic and her symptoms are improving.  She denies any dark or bloody stools.  Her stools are becoming less loose and her pain is improved.  Patient is keeping down fluids and bland foods.  Overall her symptoms have improved.      Objective   Vital Signs:   /82   Pulse 105   Temp 98.4 °F (36.9 °C)   Ht 167.6 cm (66\")   Wt 65.6 kg (144 lb 9.6 oz)   SpO2 98%   BMI 23.34 kg/m²     Physical Exam  Vitals and nursing note reviewed.   Constitutional:       Appearance: Normal appearance. She is well-developed and normal weight.   HENT:      Head: Normocephalic and atraumatic.      Right Ear: External ear normal.      Left Ear: External ear normal.      Nose: Nose normal.   Eyes:      General: No scleral icterus.     Conjunctiva/sclera: Conjunctivae normal.   Cardiovascular:      Rate and Rhythm: Normal rate and regular rhythm.      Heart sounds: Normal heart sounds.   Pulmonary:      Effort: Pulmonary effort is normal.      Breath sounds: Normal breath sounds.   Abdominal:      General: Bowel sounds are normal. There is no distension.      Palpations: Abdomen is soft. There is no mass.      Tenderness: There is abdominal tenderness (slight TTP LLQ). There is no right CVA tenderness, left CVA tenderness, guarding or rebound.      Hernia: No hernia is " present.   Musculoskeletal:      Cervical back: Normal range of motion and neck supple.      Right lower leg: No edema.      Left lower leg: No edema.   Lymphadenopathy:      Cervical: No cervical adenopathy.   Skin:     General: Skin is warm and dry.      Findings: No rash.   Neurological:      Mental Status: She is alert and oriented to person, place, and time.   Psychiatric:         Mood and Affect: Mood normal.         Behavior: Behavior normal.         Thought Content: Thought content normal.         Judgment: Judgment normal.        Result Review :   The following data was reviewed by: Karyn Preston DO on 02/17/2022:  Common labs    Common Labsle 8/11/21 8/11/21 8/11/21 11/11/21 11/11/21 2/12/22 2/12/22    1556 1556 1556 1115 1115 1745 1745   Glucose   93  107 (A)  103 (A)   BUN   12  17  7   Creatinine   0.76  0.83  0.66   eGFR Non  Am   98  88  99   eGFR  Am   113  102     Sodium   141  144  138   Potassium   4.6  4.2  3.2 (A)   Chloride   103  98  104   Calcium   10.0  10.4 (A)  8.9   Total Protein   6.9  7.7     Albumin   4.7  5.1 (A)  4.60   Total Bilirubin   0.3  0.3  0.2   Alkaline Phosphatase   80  84  93   AST (SGOT)   14  14  17   ALT (SGPT)   8  15  15   WBC  5.0  4.6  7.66    Hemoglobin  12.2  13.2  12.0    Hematocrit  37.4  38.8  35.2    Platelets  401  487 (A)  351    Total Cholesterol 243 (A)         Triglycerides 280 (A)         HDL Cholesterol 49         LDL Cholesterol  143 (A)         (A) Abnormal value       Comments are available for some flowsheets but are not being displayed.           TSH    TSH 8/11/21   TSH 0.726                    ED with Geremias Sepulveda MD (02/12/2022)  Lipase (02/12/2022 17:45)  Comprehensive Metabolic Panel (02/12/2022 17:45)  CBC & Differential (02/12/2022 17:45)  Urinalysis, Microscopic Only - Urine, Clean Catch (02/12/2022 17:44)  Urinalysis With Microscopic If Indicated (No Culture) - Urine, Clean Catch (02/12/2022 17:44)  CT Abdomen Pelvis  With Contrast (02/12/2022 19:28)    Assessment and Plan    Diagnoses and all orders for this visit:    1. Diverticulitis (Primary)  -     Basic Metabolic Panel  -     HYDROcodone-acetaminophen (NORCO) 5-325 MG per tablet; Take 1 tablet by mouth Every 6 (Six) Hours As Needed for Moderate Pain .  Dispense: 12 tablet; Refill: 0    Patient of Dr. Kehrer's is here to follow-up from an ER visit for acute diverticulitis.  Reviewed ER note and labs and CT as above.  Her symptoms are improving on Augmentin.  Advised her to finish all of the antibiotic and monitor her symptoms closely.  If she has worsening symptoms she should seek more urgent medical care.  Keep follow-up with Dr. Johnston for planned hemicolectomy for long-term treatment.      Follow Up   No follow-ups on file.  Patient was given instructions and counseling regarding her condition or for health maintenance advice. Please see specific information pulled into the AVS if appropriate.

## 2022-02-18 LAB
BUN SERPL-MCNC: 12 MG/DL (ref 6–24)
BUN/CREAT SERPL: 16 (ref 9–23)
CALCIUM SERPL-MCNC: 10 MG/DL (ref 8.7–10.2)
CHLORIDE SERPL-SCNC: 104 MMOL/L (ref 96–106)
CO2 SERPL-SCNC: 18 MMOL/L (ref 20–29)
CREAT SERPL-MCNC: 0.75 MG/DL (ref 0.57–1)
GLUCOSE SERPL-MCNC: 84 MG/DL (ref 65–99)
POTASSIUM SERPL-SCNC: 4.4 MMOL/L (ref 3.5–5.2)
SODIUM SERPL-SCNC: 143 MMOL/L (ref 134–144)

## 2022-02-25 ENCOUNTER — PRE-ADMISSION TESTING (OUTPATIENT)
Dept: PREADMISSION TESTING | Facility: HOSPITAL | Age: 42
End: 2022-02-25

## 2022-02-25 VITALS
RESPIRATION RATE: 18 BRPM | TEMPERATURE: 98 F | HEART RATE: 89 BPM | BODY MASS INDEX: 22.82 KG/M2 | SYSTOLIC BLOOD PRESSURE: 126 MMHG | DIASTOLIC BLOOD PRESSURE: 85 MMHG | WEIGHT: 142 LBS | HEIGHT: 66 IN | OXYGEN SATURATION: 100 %

## 2022-02-25 DIAGNOSIS — K57.20 DIVERTICULITIS OF LARGE INTESTINE WITH ABSCESS WITHOUT BLEEDING: ICD-10-CM

## 2022-02-25 LAB
ABO GROUP BLD: NORMAL
BLD GP AB SCN SERPL QL: NEGATIVE
RH BLD: POSITIVE
T&S EXPIRATION DATE: NORMAL

## 2022-02-25 PROCEDURE — 36415 COLL VENOUS BLD VENIPUNCTURE: CPT

## 2022-02-25 PROCEDURE — 86900 BLOOD TYPING SEROLOGIC ABO: CPT

## 2022-02-25 PROCEDURE — 86901 BLOOD TYPING SEROLOGIC RH(D): CPT

## 2022-02-25 PROCEDURE — 86850 RBC ANTIBODY SCREEN: CPT

## 2022-02-26 ENCOUNTER — LAB (OUTPATIENT)
Dept: LAB | Facility: HOSPITAL | Age: 42
End: 2022-02-26

## 2022-02-26 DIAGNOSIS — Z01.818 OTHER SPECIFIED PRE-OPERATIVE EXAMINATION: Primary | ICD-10-CM

## 2022-02-26 LAB — SARS-COV-2 ORF1AB RESP QL NAA+PROBE: NOT DETECTED

## 2022-02-26 PROCEDURE — C9803 HOPD COVID-19 SPEC COLLECT: HCPCS

## 2022-02-26 PROCEDURE — U0004 COV-19 TEST NON-CDC HGH THRU: HCPCS

## 2022-03-01 ENCOUNTER — ANESTHESIA EVENT (OUTPATIENT)
Dept: PERIOP | Facility: HOSPITAL | Age: 42
End: 2022-03-01

## 2022-03-01 ENCOUNTER — HOSPITAL ENCOUNTER (INPATIENT)
Facility: HOSPITAL | Age: 42
LOS: 2 days | Discharge: HOME | End: 2022-03-03
Attending: COLON & RECTAL SURGERY | Admitting: COLON & RECTAL SURGERY

## 2022-03-01 ENCOUNTER — ANESTHESIA (OUTPATIENT)
Dept: PERIOP | Facility: HOSPITAL | Age: 42
End: 2022-03-01

## 2022-03-01 DIAGNOSIS — K57.92 DIVERTICULITIS: ICD-10-CM

## 2022-03-01 DIAGNOSIS — K57.20 DIVERTICULITIS OF LARGE INTESTINE WITH ABSCESS WITHOUT BLEEDING: ICD-10-CM

## 2022-03-01 PROCEDURE — 25010000002 HYDROMORPHONE PER 4 MG: Performed by: NURSE ANESTHETIST, CERTIFIED REGISTERED

## 2022-03-01 PROCEDURE — 25010000002 PROPOFOL 10 MG/ML EMULSION: Performed by: NURSE ANESTHETIST, CERTIFIED REGISTERED

## 2022-03-01 PROCEDURE — 0DTN4ZZ RESECTION OF SIGMOID COLON, PERCUTANEOUS ENDOSCOPIC APPROACH: ICD-10-PCS | Performed by: COLON & RECTAL SURGERY

## 2022-03-01 PROCEDURE — 8E0W4CZ ROBOTIC ASSISTED PROCEDURE OF TRUNK REGION, PERCUTANEOUS ENDOSCOPIC APPROACH: ICD-10-PCS | Performed by: COLON & RECTAL SURGERY

## 2022-03-01 PROCEDURE — 25010000002 DEXAMETHASONE PER 1 MG: Performed by: NURSE ANESTHETIST, CERTIFIED REGISTERED

## 2022-03-01 PROCEDURE — 44208 L COLECTOMY/COLOPROCTOSTOMY: CPT | Performed by: COLON & RECTAL SURGERY

## 2022-03-01 PROCEDURE — 25010000002 MIDAZOLAM PER 1 MG: Performed by: ANESTHESIOLOGY

## 2022-03-01 PROCEDURE — 88305 TISSUE EXAM BY PATHOLOGIST: CPT | Performed by: COLON & RECTAL SURGERY

## 2022-03-01 PROCEDURE — C1889 IMPLANT/INSERT DEVICE, NOC: HCPCS | Performed by: COLON & RECTAL SURGERY

## 2022-03-01 PROCEDURE — 25010000002 PHENYLEPHRINE 10 MG/ML SOLUTION: Performed by: NURSE ANESTHETIST, CERTIFIED REGISTERED

## 2022-03-01 PROCEDURE — 0 BUPIVACAINE LIPOSOME 1.3 % SUSPENSION: Performed by: ANESTHESIOLOGY

## 2022-03-01 PROCEDURE — 25010000002 ONDANSETRON PER 1 MG: Performed by: NURSE ANESTHETIST, CERTIFIED REGISTERED

## 2022-03-01 PROCEDURE — 25010000002 NEOSTIGMINE 5 MG/10ML SOLUTION: Performed by: NURSE ANESTHETIST, CERTIFIED REGISTERED

## 2022-03-01 PROCEDURE — 25010000002 LIDOCAINE PER 10 MG: Performed by: NURSE ANESTHETIST, CERTIFIED REGISTERED

## 2022-03-01 PROCEDURE — 44208 L COLECTOMY/COLOPROCTOSTOMY: CPT | Performed by: PHYSICIAN ASSISTANT

## 2022-03-01 PROCEDURE — S2900 ROBOTIC SURGICAL SYSTEM: HCPCS | Performed by: COLON & RECTAL SURGERY

## 2022-03-01 PROCEDURE — 25010000002 MAGNESIUM SULFATE PER 500 MG OF MAGNESIUM: Performed by: NURSE ANESTHETIST, CERTIFIED REGISTERED

## 2022-03-01 PROCEDURE — 88307 TISSUE EXAM BY PATHOLOGIST: CPT | Performed by: COLON & RECTAL SURGERY

## 2022-03-01 PROCEDURE — 25010000002 HYDROMORPHONE PER 4 MG: Performed by: COLON & RECTAL SURGERY

## 2022-03-01 PROCEDURE — C9290 INJ, BUPIVACAINE LIPOSOME: HCPCS | Performed by: ANESTHESIOLOGY

## 2022-03-01 PROCEDURE — 94799 UNLISTED PULMONARY SVC/PX: CPT

## 2022-03-01 DEVICE — STAPLER 60 RELOAD GREEN
Type: IMPLANTABLE DEVICE | Site: ABDOMEN | Status: FUNCTIONAL
Brand: SUREFORM

## 2022-03-01 DEVICE — SEALANT WND FIBRIN TISSEEL PREFIL/SYR/PRIMAFZ 10ML: Type: IMPLANTABLE DEVICE | Site: ABDOMEN | Status: FUNCTIONAL

## 2022-03-01 DEVICE — STAPLER 60 RELOAD BLUE
Type: IMPLANTABLE DEVICE | Site: ABDOMEN | Status: FUNCTIONAL
Brand: SUREFORM

## 2022-03-01 DEVICE — CELLULAR STAPLER WITH TRI-STAPLE TECHNOLOGY
Type: IMPLANTABLE DEVICE | Site: ABDOMEN | Status: FUNCTIONAL
Brand: EEA

## 2022-03-01 RX ORDER — PANTOPRAZOLE SODIUM 40 MG/1
40 TABLET, DELAYED RELEASE ORAL
Status: DISCONTINUED | OUTPATIENT
Start: 2022-03-02 | End: 2022-03-03 | Stop reason: HOSPADM

## 2022-03-01 RX ORDER — SODIUM CHLORIDE, SODIUM LACTATE, POTASSIUM CHLORIDE, CALCIUM CHLORIDE 600; 310; 30; 20 MG/100ML; MG/100ML; MG/100ML; MG/100ML
9 INJECTION, SOLUTION INTRAVENOUS CONTINUOUS
Status: DISCONTINUED | OUTPATIENT
Start: 2022-03-01 | End: 2022-03-01

## 2022-03-01 RX ORDER — GABAPENTIN 300 MG/1
600 CAPSULE ORAL 2 TIMES DAILY
Status: DISCONTINUED | OUTPATIENT
Start: 2022-03-01 | End: 2022-03-03 | Stop reason: HOSPADM

## 2022-03-01 RX ORDER — ONDANSETRON 2 MG/ML
4 INJECTION INTRAMUSCULAR; INTRAVENOUS EVERY 6 HOURS PRN
Status: DISCONTINUED | OUTPATIENT
Start: 2022-03-01 | End: 2022-03-03 | Stop reason: HOSPADM

## 2022-03-01 RX ORDER — ROCURONIUM BROMIDE 10 MG/ML
INJECTION, SOLUTION INTRAVENOUS AS NEEDED
Status: DISCONTINUED | OUTPATIENT
Start: 2022-03-01 | End: 2022-03-01 | Stop reason: SURG

## 2022-03-01 RX ORDER — LIDOCAINE HYDROCHLORIDE 10 MG/ML
0.5 INJECTION, SOLUTION EPIDURAL; INFILTRATION; INTRACAUDAL; PERINEURAL ONCE AS NEEDED
Status: DISCONTINUED | OUTPATIENT
Start: 2022-03-01 | End: 2022-03-01 | Stop reason: HOSPADM

## 2022-03-01 RX ORDER — SODIUM CHLORIDE 0.9 % (FLUSH) 0.9 %
3-10 SYRINGE (ML) INJECTION AS NEEDED
Status: DISCONTINUED | OUTPATIENT
Start: 2022-03-01 | End: 2022-03-01 | Stop reason: HOSPADM

## 2022-03-01 RX ORDER — NEOSTIGMINE METHYLSULFATE 0.5 MG/ML
INJECTION, SOLUTION INTRAVENOUS AS NEEDED
Status: DISCONTINUED | OUTPATIENT
Start: 2022-03-01 | End: 2022-03-01 | Stop reason: SURG

## 2022-03-01 RX ORDER — ALVIMOPAN 12 MG/1
12 CAPSULE ORAL ONCE
Status: COMPLETED | OUTPATIENT
Start: 2022-03-01 | End: 2022-03-01

## 2022-03-01 RX ORDER — HYDROMORPHONE HYDROCHLORIDE 1 MG/ML
0.25 INJECTION, SOLUTION INTRAMUSCULAR; INTRAVENOUS; SUBCUTANEOUS
Status: DISCONTINUED | OUTPATIENT
Start: 2022-03-01 | End: 2022-03-01 | Stop reason: HOSPADM

## 2022-03-01 RX ORDER — SODIUM CHLORIDE, SODIUM LACTATE, POTASSIUM CHLORIDE, CALCIUM CHLORIDE 600; 310; 30; 20 MG/100ML; MG/100ML; MG/100ML; MG/100ML
50 INJECTION, SOLUTION INTRAVENOUS CONTINUOUS
Status: DISCONTINUED | OUTPATIENT
Start: 2022-03-01 | End: 2022-03-03 | Stop reason: HOSPADM

## 2022-03-01 RX ORDER — GABAPENTIN 300 MG/1
600 CAPSULE ORAL ONCE
Status: DISCONTINUED | OUTPATIENT
Start: 2022-03-01 | End: 2022-03-01

## 2022-03-01 RX ORDER — KETAMINE HYDROCHLORIDE 10 MG/ML
INJECTION INTRAMUSCULAR; INTRAVENOUS AS NEEDED
Status: DISCONTINUED | OUTPATIENT
Start: 2022-03-01 | End: 2022-03-01 | Stop reason: SURG

## 2022-03-01 RX ORDER — CELECOXIB 100 MG/1
200 CAPSULE ORAL ONCE
Status: COMPLETED | OUTPATIENT
Start: 2022-03-01 | End: 2022-03-01

## 2022-03-01 RX ORDER — FENTANYL CITRATE 50 UG/ML
50 INJECTION, SOLUTION INTRAMUSCULAR; INTRAVENOUS
Status: DISCONTINUED | OUTPATIENT
Start: 2022-03-01 | End: 2022-03-01 | Stop reason: HOSPADM

## 2022-03-01 RX ORDER — DEXAMETHASONE SODIUM PHOSPHATE 10 MG/ML
INJECTION INTRAMUSCULAR; INTRAVENOUS AS NEEDED
Status: DISCONTINUED | OUTPATIENT
Start: 2022-03-01 | End: 2022-03-01 | Stop reason: SURG

## 2022-03-01 RX ORDER — MAGNESIUM SULFATE HEPTAHYDRATE 500 MG/ML
INJECTION, SOLUTION INTRAMUSCULAR; INTRAVENOUS AS NEEDED
Status: DISCONTINUED | OUTPATIENT
Start: 2022-03-01 | End: 2022-03-01 | Stop reason: SURG

## 2022-03-01 RX ORDER — CLINDAMYCIN PHOSPHATE 900 MG/50ML
900 INJECTION INTRAVENOUS
Status: COMPLETED | OUTPATIENT
Start: 2022-03-01 | End: 2022-03-01

## 2022-03-01 RX ORDER — ACETAMINOPHEN 500 MG
1000 TABLET ORAL ONCE
Status: DISCONTINUED | OUTPATIENT
Start: 2022-03-01 | End: 2022-03-01

## 2022-03-01 RX ORDER — MAGNESIUM HYDROXIDE 1200 MG/15ML
LIQUID ORAL AS NEEDED
Status: DISCONTINUED | OUTPATIENT
Start: 2022-03-01 | End: 2022-03-01 | Stop reason: HOSPADM

## 2022-03-01 RX ORDER — BUPIVACAINE HYDROCHLORIDE 2.5 MG/ML
INJECTION, SOLUTION EPIDURAL; INFILTRATION; INTRACAUDAL
Status: COMPLETED | OUTPATIENT
Start: 2022-03-01 | End: 2022-03-01

## 2022-03-01 RX ORDER — ALVIMOPAN 12 MG/1
12 CAPSULE ORAL 2 TIMES DAILY
Status: DISCONTINUED | OUTPATIENT
Start: 2022-03-02 | End: 2022-03-02

## 2022-03-01 RX ORDER — NITROGLYCERIN 0.4 MG/1
0.4 TABLET SUBLINGUAL
Status: DISCONTINUED | OUTPATIENT
Start: 2022-03-01 | End: 2022-03-03 | Stop reason: HOSPADM

## 2022-03-01 RX ORDER — LIDOCAINE HYDROCHLORIDE 20 MG/ML
INJECTION, SOLUTION INFILTRATION; PERINEURAL AS NEEDED
Status: DISCONTINUED | OUTPATIENT
Start: 2022-03-01 | End: 2022-03-01 | Stop reason: SURG

## 2022-03-01 RX ORDER — ONDANSETRON 2 MG/ML
4 INJECTION INTRAMUSCULAR; INTRAVENOUS ONCE AS NEEDED
Status: DISCONTINUED | OUTPATIENT
Start: 2022-03-01 | End: 2022-03-01 | Stop reason: HOSPADM

## 2022-03-01 RX ORDER — ACETAMINOPHEN 500 MG
1000 TABLET ORAL ONCE
Status: COMPLETED | OUTPATIENT
Start: 2022-03-01 | End: 2022-03-01

## 2022-03-01 RX ORDER — SCOLOPAMINE TRANSDERMAL SYSTEM 1 MG/1
1 PATCH, EXTENDED RELEASE TRANSDERMAL CONTINUOUS
Status: DISCONTINUED | OUTPATIENT
Start: 2022-03-01 | End: 2022-03-02

## 2022-03-01 RX ORDER — NALOXONE HCL 0.4 MG/ML
0.4 VIAL (ML) INJECTION
Status: DISCONTINUED | OUTPATIENT
Start: 2022-03-01 | End: 2022-03-03 | Stop reason: HOSPADM

## 2022-03-01 RX ORDER — ACETAMINOPHEN 10 MG/ML
1000 INJECTION, SOLUTION INTRAVENOUS ONCE
Status: COMPLETED | OUTPATIENT
Start: 2022-03-01 | End: 2022-03-01

## 2022-03-01 RX ORDER — BUPIVACAINE HYDROCHLORIDE AND EPINEPHRINE 2.5; 5 MG/ML; UG/ML
INJECTION, SOLUTION EPIDURAL; INFILTRATION; INTRACAUDAL; PERINEURAL AS NEEDED
Status: DISCONTINUED | OUTPATIENT
Start: 2022-03-01 | End: 2022-03-01 | Stop reason: HOSPADM

## 2022-03-01 RX ORDER — ESMOLOL HYDROCHLORIDE 10 MG/ML
INJECTION INTRAVENOUS AS NEEDED
Status: DISCONTINUED | OUTPATIENT
Start: 2022-03-01 | End: 2022-03-01 | Stop reason: SURG

## 2022-03-01 RX ORDER — HYDROMORPHONE HYDROCHLORIDE 1 MG/ML
0.25 INJECTION, SOLUTION INTRAMUSCULAR; INTRAVENOUS; SUBCUTANEOUS
Status: DISCONTINUED | OUTPATIENT
Start: 2022-03-01 | End: 2022-03-03 | Stop reason: HOSPADM

## 2022-03-01 RX ORDER — GLYCOPYRROLATE 0.2 MG/ML
INJECTION INTRAMUSCULAR; INTRAVENOUS AS NEEDED
Status: DISCONTINUED | OUTPATIENT
Start: 2022-03-01 | End: 2022-03-01 | Stop reason: SURG

## 2022-03-01 RX ORDER — ULTRASOUND COUPLING MEDIUM
GEL (GRAM) TOPICAL AS NEEDED
Status: DISCONTINUED | OUTPATIENT
Start: 2022-03-01 | End: 2022-03-01 | Stop reason: HOSPADM

## 2022-03-01 RX ORDER — PROPOFOL 10 MG/ML
VIAL (ML) INTRAVENOUS AS NEEDED
Status: DISCONTINUED | OUTPATIENT
Start: 2022-03-01 | End: 2022-03-01 | Stop reason: SURG

## 2022-03-01 RX ORDER — INDOCYANINE GREEN AND WATER 25 MG
KIT INJECTION AS NEEDED
Status: DISCONTINUED | OUTPATIENT
Start: 2022-03-01 | End: 2022-03-01 | Stop reason: SURG

## 2022-03-01 RX ORDER — ONDANSETRON 2 MG/ML
INJECTION INTRAMUSCULAR; INTRAVENOUS AS NEEDED
Status: DISCONTINUED | OUTPATIENT
Start: 2022-03-01 | End: 2022-03-01 | Stop reason: SURG

## 2022-03-01 RX ORDER — CLINDAMYCIN PHOSPHATE 900 MG/50ML
900 INJECTION INTRAVENOUS
Status: DISCONTINUED | OUTPATIENT
Start: 2022-03-01 | End: 2022-03-01

## 2022-03-01 RX ORDER — EPHEDRINE SULFATE 50 MG/ML
INJECTION, SOLUTION INTRAVENOUS AS NEEDED
Status: DISCONTINUED | OUTPATIENT
Start: 2022-03-01 | End: 2022-03-01 | Stop reason: SURG

## 2022-03-01 RX ORDER — ACETAMINOPHEN 500 MG
1000 TABLET ORAL EVERY 6 HOURS
Status: DISCONTINUED | OUTPATIENT
Start: 2022-03-01 | End: 2022-03-03 | Stop reason: HOSPADM

## 2022-03-01 RX ORDER — NALOXONE HCL 0.4 MG/ML
0.4 VIAL (ML) INJECTION
Status: DISCONTINUED | OUTPATIENT
Start: 2022-03-01 | End: 2022-03-01 | Stop reason: HOSPADM

## 2022-03-01 RX ORDER — SODIUM CHLORIDE, SODIUM LACTATE, POTASSIUM CHLORIDE, CALCIUM CHLORIDE 600; 310; 30; 20 MG/100ML; MG/100ML; MG/100ML; MG/100ML
INJECTION, SOLUTION INTRAVENOUS CONTINUOUS PRN
Status: DISCONTINUED | OUTPATIENT
Start: 2022-03-01 | End: 2022-03-01 | Stop reason: SURG

## 2022-03-01 RX ORDER — GABAPENTIN 300 MG/1
600 CAPSULE ORAL ONCE
Status: COMPLETED | OUTPATIENT
Start: 2022-03-01 | End: 2022-03-01

## 2022-03-01 RX ORDER — HYDROMORPHONE HCL 110MG/55ML
PATIENT CONTROLLED ANALGESIA SYRINGE INTRAVENOUS AS NEEDED
Status: DISCONTINUED | OUTPATIENT
Start: 2022-03-01 | End: 2022-03-01 | Stop reason: SURG

## 2022-03-01 RX ORDER — ALVIMOPAN 12 MG/1
12 CAPSULE ORAL ONCE
Status: DISCONTINUED | OUTPATIENT
Start: 2022-03-01 | End: 2022-03-01

## 2022-03-01 RX ORDER — MIDAZOLAM HYDROCHLORIDE 1 MG/ML
1 INJECTION INTRAMUSCULAR; INTRAVENOUS
Status: DISCONTINUED | OUTPATIENT
Start: 2022-03-01 | End: 2022-03-01 | Stop reason: HOSPADM

## 2022-03-01 RX ORDER — PHENYLEPHRINE HYDROCHLORIDE 10 MG/ML
INJECTION INTRAVENOUS AS NEEDED
Status: DISCONTINUED | OUTPATIENT
Start: 2022-03-01 | End: 2022-03-01 | Stop reason: SURG

## 2022-03-01 RX ORDER — SODIUM CHLORIDE 0.9 % (FLUSH) 0.9 %
3 SYRINGE (ML) INJECTION EVERY 12 HOURS SCHEDULED
Status: DISCONTINUED | OUTPATIENT
Start: 2022-03-01 | End: 2022-03-01 | Stop reason: HOSPADM

## 2022-03-01 RX ORDER — CELECOXIB 100 MG/1
200 CAPSULE ORAL ONCE
Status: DISCONTINUED | OUTPATIENT
Start: 2022-03-01 | End: 2022-03-01

## 2022-03-01 RX ORDER — SODIUM CHLORIDE 9 MG/ML
INJECTION, SOLUTION INTRAVENOUS AS NEEDED
Status: DISCONTINUED | OUTPATIENT
Start: 2022-03-01 | End: 2022-03-01 | Stop reason: HOSPADM

## 2022-03-01 RX ORDER — METOCLOPRAMIDE HYDROCHLORIDE 5 MG/ML
10 INJECTION INTRAMUSCULAR; INTRAVENOUS ONCE AS NEEDED
Status: DISCONTINUED | OUTPATIENT
Start: 2022-03-01 | End: 2022-03-01 | Stop reason: HOSPADM

## 2022-03-01 RX ORDER — CELECOXIB 200 MG/1
200 CAPSULE ORAL EVERY 12 HOURS SCHEDULED
Status: DISCONTINUED | OUTPATIENT
Start: 2022-03-01 | End: 2022-03-03 | Stop reason: HOSPADM

## 2022-03-01 RX ORDER — DESVENLAFAXINE SUCCINATE 50 MG/1
100 TABLET, EXTENDED RELEASE ORAL DAILY
Status: DISCONTINUED | OUTPATIENT
Start: 2022-03-02 | End: 2022-03-03 | Stop reason: HOSPADM

## 2022-03-01 RX ORDER — LIDOCAINE HYDROCHLORIDE ANHYDROUS AND DEXTROSE MONOHYDRATE 5; 400 G/100ML; MG/100ML
INJECTION, SOLUTION INTRAVENOUS CONTINUOUS PRN
Status: DISCONTINUED | OUTPATIENT
Start: 2022-03-01 | End: 2022-03-01 | Stop reason: SURG

## 2022-03-01 RX ADMIN — KETAMINE HYDROCHLORIDE 10 MG: 10 INJECTION INTRAMUSCULAR; INTRAVENOUS at 10:02

## 2022-03-01 RX ADMIN — SCOPALAMINE 1 PATCH: 1 PATCH, EXTENDED RELEASE TRANSDERMAL at 06:11

## 2022-03-01 RX ADMIN — INDOCYANINE GREEN AND WATER 7.5 MG: KIT at 09:36

## 2022-03-01 RX ADMIN — EPHEDRINE SULFATE 10 MG: 50 INJECTION INTRAVENOUS at 08:09

## 2022-03-01 RX ADMIN — BUPIVACAINE HYDROCHLORIDE 36 ML: 2.5 INJECTION, SOLUTION EPIDURAL; INFILTRATION; INTRACAUDAL; PERINEURAL at 07:43

## 2022-03-01 RX ADMIN — PHENYLEPHRINE HYDROCHLORIDE 100 MCG: 10 INJECTION, SOLUTION INTRAVENOUS at 08:09

## 2022-03-01 RX ADMIN — CELECOXIB 200 MG: 100 CAPSULE ORAL at 06:12

## 2022-03-01 RX ADMIN — MIDAZOLAM 1 MG: 1 INJECTION INTRAMUSCULAR; INTRAVENOUS at 06:57

## 2022-03-01 RX ADMIN — EPHEDRINE SULFATE 10 MG: 50 INJECTION INTRAVENOUS at 08:06

## 2022-03-01 RX ADMIN — ALVIMOPAN 12 MG: 12 CAPSULE ORAL at 06:12

## 2022-03-01 RX ADMIN — GABAPENTIN 600 MG: 300 CAPSULE ORAL at 06:23

## 2022-03-01 RX ADMIN — HYDROMORPHONE HYDROCHLORIDE 0.25 MG: 1 INJECTION, SOLUTION INTRAMUSCULAR; INTRAVENOUS; SUBCUTANEOUS at 14:05

## 2022-03-01 RX ADMIN — PHENYLEPHRINE HYDROCHLORIDE 100 MCG: 10 INJECTION, SOLUTION INTRAVENOUS at 07:46

## 2022-03-01 RX ADMIN — INDOCYANINE GREEN AND WATER 7.5 MG: KIT at 10:25

## 2022-03-01 RX ADMIN — SODIUM CHLORIDE, POTASSIUM CHLORIDE, SODIUM LACTATE AND CALCIUM CHLORIDE: 600; 310; 30; 20 INJECTION, SOLUTION INTRAVENOUS at 10:40

## 2022-03-01 RX ADMIN — PHENYLEPHRINE HYDROCHLORIDE 100 MCG: 10 INJECTION, SOLUTION INTRAVENOUS at 11:11

## 2022-03-01 RX ADMIN — HYDROMORPHONE HYDROCHLORIDE 0.5 MG: 2 INJECTION, SOLUTION INTRAMUSCULAR; INTRAVENOUS; SUBCUTANEOUS at 11:36

## 2022-03-01 RX ADMIN — HYDROMORPHONE HYDROCHLORIDE 0.25 MG: 1 INJECTION, SOLUTION INTRAMUSCULAR; INTRAVENOUS; SUBCUTANEOUS at 17:36

## 2022-03-01 RX ADMIN — DEXAMETHASONE SODIUM PHOSPHATE 10 MG: 10 INJECTION INTRAMUSCULAR; INTRAVENOUS at 07:39

## 2022-03-01 RX ADMIN — ROCURONIUM BROMIDE 10 MG: 50 INJECTION INTRAVENOUS at 09:07

## 2022-03-01 RX ADMIN — PHENYLEPHRINE HYDROCHLORIDE 100 MCG: 10 INJECTION, SOLUTION INTRAVENOUS at 10:06

## 2022-03-01 RX ADMIN — LIDOCAINE HYDROCHLORIDE 60 MG: 20 INJECTION, SOLUTION INFILTRATION; PERINEURAL at 07:32

## 2022-03-01 RX ADMIN — ACETAMINOPHEN 1000 MG: 500 TABLET ORAL at 17:36

## 2022-03-01 RX ADMIN — CLINDAMYCIN PHOSPHATE 900 MG: 900 INJECTION, SOLUTION INTRAVENOUS at 07:22

## 2022-03-01 RX ADMIN — SODIUM CHLORIDE, POTASSIUM CHLORIDE, SODIUM LACTATE AND CALCIUM CHLORIDE 50 ML/HR: 600; 310; 30; 20 INJECTION, SOLUTION INTRAVENOUS at 17:49

## 2022-03-01 RX ADMIN — LIDOCAINE HYDROCHLORIDE 2 MG/MIN: 4 INJECTION, SOLUTION INTRAVENOUS at 07:43

## 2022-03-01 RX ADMIN — MAGNESIUM SULFATE HEPTAHYDRATE 2 G: 500 INJECTION, SOLUTION INTRAMUSCULAR; INTRAVENOUS at 07:50

## 2022-03-01 RX ADMIN — ESMOLOL HYDROCHLORIDE 10 MG: 100 INJECTION, SOLUTION INTRAVENOUS at 07:41

## 2022-03-01 RX ADMIN — PHENYLEPHRINE HYDROCHLORIDE 100 MCG: 10 INJECTION, SOLUTION INTRAVENOUS at 08:12

## 2022-03-01 RX ADMIN — KETAMINE HYDROCHLORIDE 10 MG: 10 INJECTION INTRAMUSCULAR; INTRAVENOUS at 08:59

## 2022-03-01 RX ADMIN — CELECOXIB 200 MG: 200 CAPSULE ORAL at 21:49

## 2022-03-01 RX ADMIN — ACETAMINOPHEN 1000 MG: 500 TABLET ORAL at 23:30

## 2022-03-01 RX ADMIN — NEOSTIGMINE METHYLSULFATE 2 MG: 0.5 INJECTION INTRAVENOUS at 11:24

## 2022-03-01 RX ADMIN — KETAMINE HYDROCHLORIDE 20 MG: 10 INJECTION INTRAMUSCULAR; INTRAVENOUS at 08:02

## 2022-03-01 RX ADMIN — SODIUM CHLORIDE, POTASSIUM CHLORIDE, SODIUM LACTATE AND CALCIUM CHLORIDE: 600; 310; 30; 20 INJECTION, SOLUTION INTRAVENOUS at 07:26

## 2022-03-01 RX ADMIN — ACETAMINOPHEN 1000 MG: 10 INJECTION, SOLUTION INTRAVENOUS at 12:31

## 2022-03-01 RX ADMIN — PROPOFOL 200 MG: 10 INJECTION, EMULSION INTRAVENOUS at 07:32

## 2022-03-01 RX ADMIN — HYDROMORPHONE HYDROCHLORIDE 0.5 MG: 2 INJECTION, SOLUTION INTRAMUSCULAR; INTRAVENOUS; SUBCUTANEOUS at 11:25

## 2022-03-01 RX ADMIN — GLYCOPYRROLATE 0.4 MG: 0.2 INJECTION INTRAMUSCULAR; INTRAVENOUS at 11:24

## 2022-03-01 RX ADMIN — PHENYLEPHRINE HYDROCHLORIDE 100 MCG: 10 INJECTION, SOLUTION INTRAVENOUS at 10:56

## 2022-03-01 RX ADMIN — SODIUM CHLORIDE, POTASSIUM CHLORIDE, SODIUM LACTATE AND CALCIUM CHLORIDE 9 ML/HR: 600; 310; 30; 20 INJECTION, SOLUTION INTRAVENOUS at 06:57

## 2022-03-01 RX ADMIN — GABAPENTIN 600 MG: 300 CAPSULE ORAL at 19:46

## 2022-03-01 RX ADMIN — ONDANSETRON 4 MG: 2 INJECTION INTRAMUSCULAR; INTRAVENOUS at 11:24

## 2022-03-01 RX ADMIN — ROCURONIUM BROMIDE 50 MG: 50 INJECTION INTRAVENOUS at 07:32

## 2022-03-01 RX ADMIN — BUPIVACAINE 20 ML: 13.3 INJECTION, SUSPENSION, LIPOSOMAL INFILTRATION at 07:43

## 2022-03-01 RX ADMIN — HYDROMORPHONE HYDROCHLORIDE 0.25 MG: 1 INJECTION, SOLUTION INTRAMUSCULAR; INTRAVENOUS; SUBCUTANEOUS at 23:35

## 2022-03-01 RX ADMIN — KETAMINE HYDROCHLORIDE 10 MG: 10 INJECTION INTRAMUSCULAR; INTRAVENOUS at 10:59

## 2022-03-01 RX ADMIN — ACETAMINOPHEN 1000 MG: 500 TABLET ORAL at 06:12

## 2022-03-01 NOTE — OP NOTE
Surgeon: Alejandro Johnston MD    Surgical  Assistant: Alejandra Dodd PA-C     Preoperative diagnosis: Diverticulitis [K57.92]    Post-Op Diagnosis Codes:     * Diverticulitis [K57.92]    Procedure: MOBILIZATION OF SPLENIC FLEXURE AND SIGMOID COLON RESECTION LAPAROSCOPIC WITH DAVINCI ROBOT, * Panel 2 does not exist *    Estimated Blood Loss: 100ml    Specimens:    Order Name Source Comment Collection Info Order Time   TISSUE PATHOLOGY EXAM Large Intestine, Rectum  Collected By: Alejandro Johnston MD 3/1/2022 10:27 AM     Release to patient   Immediate            Indication:  Marleny Palma is a 42 y.o. female who comes in with Diverticulitis of large intestine with abscess without bleeding    Diverticulitis  Patient understands risks, benefits,and alternatives wishes to proceed.      Procedure Details:  Patient was brought to the operating room, SCD's in place, antibiotics infused. After general anesthesia was achieved the patient had a TAP block done by anesthesia.  The patient was then placed in Yellofins lithotomy and prepped and draped in sterile fashion. Marcaine 0.25% with epinephrine was used as a local infiltration at the trocar sites. First incision was made in the left upper quadrant midclavicular line right below the costal margin and Veress needle was used to gain access into the abdomen and insufflated up to 15 mmHg. A 8 mm trocar was placed in the abdomen, I inspected the abdomen and there did not appear to be any peritoneal disease. I then went ahead and placed 3 other trocars in a diagonal down to a few centimeters away from the right ASIS.  I made theright lower quadrant incision larger to fit the small gel point so that we could put the anvil in the abdomen later and this will be at the extraction site.  I then placed an assistant port in the right midquadrant.  I put the patient in steep Trendelenburg 6 degrees tilted to the right and docked the robot. I placed all instruments under direct  visualization and then I went to the console.  I lifted up the sigmoid colon and found the RICHARD and started dissecting distal to this and found the left ureter and got into the retrorectal space.  The small intestine was adherent to the sigmoid colon.  I used scissors to take these adhesions down sharply.  There was a lot of inflammation to the left side of the pelvis.  It was attached to the suture from her previous hysterectomy. I took all of that adhesions down sharply with scissors.      Once I got as much of the dissection done with the scissors, I then exchanged them out for the Synch or seal.  I took the RICHARD with multiple burns and then started taking the lateral stalk with the vessel sealer.  I got the posterior mesentery of the rectum distal to the inflamed area and used the vessel sealer to take that.  Once I got it cleaned off I used 1 fires of green load 60 mm stapler  I then tried cleaning off the mesentery of the distal descending as my proximal margin.  I gave ICG and just distal to the transition point made a colotomy.  I did a small burn in the anterior colon to bring the anvil out.  I then closed the colotomy with 2-0 Vicryl and stapled just proximal to this and then put the colon specimen in the right lower quadrant.  I then brought the anvil and colon down to the rectum.  The assistant went below and brought up the EEA sizers.  We could not get the largest sizer up because of the smaller size of her rectum.  I could not get it all the way up to my staple line either.  So I took another 5 cm of upper rectum with another green load.  I took the mesentery with the sink or seal.  And then the 28 stapler, brought the pin out anterior and on the left side to the staple line.  The staple line of the rectum ended up being more on the right side of the anastomosis. I brought the anvil down to the pin.  The assistant started to close the stapler and made sure the orientation of the conduit was correct and  then once the stapler was in the green we held for several seconds and then fired the stapler.  We tested the anastomosis with a flexible sigmoidoscope insufflation and the anastomosis under water and there was no evidence of leak.  I then placed Tisseel around the anastomosis.   I undocked the robot and went back to the patient and brought the specimen out through the gel port.  I then closed the 12 trocar fascia site with 2-0 Vicryl.  All skin was closed with 4-0 Monocryl.    All instrument, lap counts and needle counts were correct. The patient was stable throughout the entire case and was taken to recovery.    Assistant: Alejandra Dodd PA-C was responsible for performing the following activities: Retraction, Suction, Irrigation, Suturing, Closing and Placing Dressing and their skilled assistance was necessary for the success of this case

## 2022-03-01 NOTE — H&P
Marleny Palma is a 41 y.o. female who is seen as a consult at the request of Meredith Lea Kehrer, MD for Consult, Diverticulitis, Abdominal Pain, and Constipation.       HPI:  Complains of diverticulitis flares every 3 to 6 months since 2019  Has been hospitalized twice  Has had abscess once, in March 2020, underwent CT-guided drainage  Underwent colonoscopy in March 2021 which showed diverticulitis but no evidence of inflammatory bowel disease  During her last hospitalization, a surgeon saw her and offered to operate, but she is here for a second opinion  Her symptoms are unchanged at this time since the colonoscopy in March 2021  When she is having a flare, her abdominal pain is exacerbated with Valsalva  Reports reduced appetite over the last 6 months  Reports that her father had a ruptured diverticulum in his 30s or 40s  Complains of constipation since 2019 when these flares started, has worsened over time  takes miralax daily, which sometimes helps.  Often has to take correctol  in addition.  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   • Colitis 10/25/2021     SEEN AT [facility] ER   • Cystitis 04/2021   • Depression     • Diverticulitis 11/01/2021     ADMITTED TO [facility]   • Diverticulosis     • Dyspareunia in female 07/2014   • Dysuria 02/2021   • Elevated blood pressure reading without diagnosis of hypertension 06/2019   • Environmental allergies     • Fatigue 08/2021   • Geographic tongue     • Hemorrhagic cyst of ovary 10/2016   • Hiatal hernia 03/2021   • Hidradenitis  axillaris     • Hyperlipidemia     • Irritable bowel syndrome     • Levator spasm 10/2015   • LLQ pain 08/2014   • Mallet deformity of right middle finger 05/08/2016     SEEN AT    • Migraines     • MRSA infection 2011     BILATERAL AXILLA AND GROIN   • Muscle spasm of back 06/2019   • Nasal fracture 2009   • Nasal turbinate hypertrophy     • Outlet dysfunction constipation     • Panic attacks     • Perineocele 10/2015   • Rectocele 07/2014   • Scalp abscess 05/07/2016     SEEN AT    • Scoliosis     • Seasonal allergies     • Sigmoid diverticulitis 01/11/2021     SEEN AT [facility] ER   • Strain of shoulder, right 09/2020   • Stress incontinence of urine 07/2014   • Uterovaginal prolapse 08/2014            Surgical History         Past Surgical History:   Procedure Laterality Date   • ANTERIOR AND POSTERIOR VAGINAL REPAIR W/ SACROSPINOUS LIGAMENT SUSPENSION N/A 08/06/2014     DR. ROSELYN COLVIN AT [facility]   • AXILLARY HIDRADENITIS EXCISION Bilateral 11/16/2011     DR. MARBIN CARY AT [facility]   • ENDOSCOPY AND COLONOSCOPY N/A 03/03/2021     3 CM HIATAL HERNIA, SCATTERED DIVERTICULA IN ENTIRE COLON, SMALL INTERNAL HEMORRHOIDS, DR. YASHIRA SHRESTHA AT [facility]   • HYSTERECTOMY N/A 08/06/2014     WITH RECTOCELE REPAIR, BSO, DR. ROSELYN COLVIN AT [facility]   • INCISION AND DRAINAGE ABSCESS   09/04/2013     axilla   • TONSILLECTOMY Bilateral 08/10/2015     DR. LIBRA MANZANO AT [facility]   • TRANSVAGINAL TAPING SUSPENSION N/A 08/06/2014     SLING, DR. ROSELYN COLVIN AT [facility]   • VAGINAL DELIVERY N/A 02/03/2003     DR. ASHLEY PRICE AT [facility]   • VAGINAL DELIVERY N/A 05/13/2005     DR. AVRIL VOGEL AT [facility]   • VAGINAL DELIVERY N/A 09/28/2007     DR. ASHLEY PRICE AT [facility]   • WISDOM TOOTH EXTRACTION Bilateral              Social History:   reports that she has never smoked. She has never used smokeless tobacco. She reports current alcohol use. She reports that she does not use drugs.        Marriage status:             Family History   Problem Relation Age of Onset   • Colon polyps Father     • Hypertension Father     • Arthritis Father     • Hyperlipidemia Father     • Arthritis Sister     • Depression Sister     • Hyperlipidemia Sister     • Hypertension Sister     • Depression Maternal Grandmother     • Hypertension Maternal Grandmother     • Kidney disease Maternal Grandmother     • Heart attack Maternal Grandmother     • Hypertension Maternal Grandfather     • Cancer Maternal Grandfather     • Skin cancer Maternal Grandfather     • Prostate cancer Maternal Grandfather     • Hypertension Paternal Grandmother     • Scoliosis Paternal Grandmother     • Thyroid disease Paternal Grandmother     • Hypertension Paternal Grandfather     • Stroke Paternal Grandfather     • Diabetes Paternal Grandfather     • Heart attack Paternal Grandfather              Current Outpatient Medications:   •  acetaminophen (TYLENOL) 325 MG tablet, Take 650 mg by mouth., Disp: , Rfl:   •  Chlorcyclizine-Pseudoephed (Stahist AD) 25-60 MG tablet, Take 1 tablet by mouth Daily., Disp: 90 tablet, Rfl: 0  •  chlorhexidine (HIBICLENS) 4 % external liquid, Apply  topically to the appropriate area as directed 2 (Two) Times a Day. Shower With Hibiclens Solution Twice The Day Before Surgery, Disp: 236 mL, Rfl: 0  •  cyclobenzaprine (FLEXERIL) 10 MG tablet, TAKE 1 TABLET BY MOUTH THREE TIMES DAILY AS NEEDED FOR MUSCLE SPASMS, Disp: 90 tablet, Rfl: 2  •  desvenlafaxine (Pristiq) 100 MG 24 hr tablet, Take 1 tablet by mouth Daily., Disp: 90 tablet, Rfl: 1  •  methylcellulose oral powder, Take  by mouth Daily., Disp: , Rfl:   •  ondansetron (ZOFRAN) 4 MG tablet, Take 4 mg by mouth Every 6 (Six) Hours As Needed., Disp: , Rfl:   •  pantoprazole (PROTONIX) 40 MG EC tablet, Take 40 mg by mouth Daily., Disp: , Rfl:   •  polyethylene glycol (MIRALAX) 17 GM/SCOOP powder, MIX 1 CAPFULL OF POWDER IN 8 OZ OF LIQUID AND DRINK 1 TO 2 TIMES DAILY, Disp: , Rfl:   •  metroNIDAZOLE  (Flagyl) 500 MG tablet, Take 1 tablet by mouth See Admin Instructions for 3 doses. Take one tablet at 2 PM, one tablet at 3 PM, and one tablet at 10 PM the day prior to surgery., Disp: 3 tablet, Rfl: 0  •  neomycin (MYCIFRADIN) 500 MG tablet, Take 2 tablets by mouth Take As Directed for 3 doses. Take two tablets at 2 PM, two tablets at 3 PM, and two tablets at 10 PM the day prior to surgery., Disp: 3 tablet, Rfl: 0     Allergy  Ceftin [cefuroxime axetil], Vancomycin, Oxycodone-acetaminophen, and Ultane [sevoflurane]     Review of Systems   Constitutional: Positive for decreased appetite. Negative for weight gain.   HENT: Negative for congestion, hearing loss and hoarse voice.    Eyes: Negative for blurred vision, discharge and visual disturbance.   Cardiovascular: Negative for chest pain, cyanosis and leg swelling.   Respiratory: Negative for cough, shortness of breath, sleep disturbances due to breathing and snoring.    Endocrine: Negative for cold intolerance and heat intolerance.   Hematologic/Lymphatic: Does not bruise/bleed easily.   Skin: Negative for itching, poor wound healing and skin cancer.   Musculoskeletal: Negative for arthritis, back pain, joint pain and joint swelling.   Gastrointestinal: Positive for abdominal pain and constipation. Negative for change in bowel habit and bowel incontinence.   Genitourinary: Negative for bladder incontinence, dysuria and hematuria.   Neurological: Negative for brief paralysis, excessive daytime sleepiness, dizziness, focal weakness, headaches, light-headedness and weakness.   Psychiatric/Behavioral: Negative for altered mental status and hallucinations. The patient does not have insomnia.    Allergic/Immunologic: Negative for HIV exposure and persistent infections.   All other systems reviewed and are negative.            Vitals:     11/19/21 0945   BP: 120/80   Pulse: 100   Temp: 97.8 °F (36.6 °C)   SpO2: 99%      Body mass index is 22.73 kg/m².     Physical  Exam  Exam conducted with a chaperone present.   Constitutional:       General: She is not in acute distress.     Appearance: She is well-developed.   HENT:      Head: Normocephalic and atraumatic.      Nose: Nose normal.   Eyes:      Conjunctiva/sclera: Conjunctivae normal.      Pupils: Pupils are equal, round, and reactive to light.   Neck:      Trachea: No tracheal deviation.   Pulmonary:      Effort: Pulmonary effort is normal. No respiratory distress.      Breath sounds: Normal breath sounds.   Abdominal:      General: Bowel sounds are normal. There is no distension.      Palpations: Abdomen is soft.   Musculoskeletal:         General: No deformity. Normal range of motion.      Cervical back: Normal range of motion.   Skin:     General: Skin is warm and dry.   Neurological:      Mental Status: She is alert and oriented to person, place, and time.      Cranial Nerves: No cranial nerve deficit.      Coordination: Coordination normal.      Gait: Gait normal.   Psychiatric:         Behavior: Behavior normal.         Judgment: Judgment normal.            Review of Medical Record:  I reviewed multiple CT abd/ pelvis reports and images from [facility], showing variations on inflammation of sigmoid and descending colon from November 1, 2021 July 27, 2021 April 19, 2020 April 3, 2020     Assessment:  1. Diverticulitis of large intestine with abscess without bleeding          Plan: Discussed with patient that she has had a complicated case and I would recommend a left and sigmoid colectomy.  Depending on blood supply it could end up being a total abdominal colectomy with ileorectal anastomosis.  I discussed this could be done laparoscopically and possible robot-assisted.  I described with patient typical surgical time, postop recovery including pain management, and restrictions. I discussed with patient risks, benefits, and alternatives.  The patient had opportunity to ask questions.  I answered  all questions.  Patient understands and wishes to proceed with procedure.

## 2022-03-01 NOTE — ANESTHESIA PREPROCEDURE EVALUATION
Anesthesia Evaluation     Patient summary reviewed and Nursing notes reviewed   history of anesthetic complications:  NPO Solid Status: > 8 hours  NPO Liquid Status: > 2 hours           Airway   Mallampati: II  Dental - normal exam     Pulmonary - normal exam   Cardiovascular - normal exam    (+) hyperlipidemia,       Neuro/Psych  (+) psychiatric history Anxiety and Depression,    GI/Hepatic/Renal/Endo    (+)  hiatal hernia, GERD,      Musculoskeletal     Abdominal    Substance History      OB/GYN          Other                        Anesthesia Plan    ASA 2     general with block and ERAS Protocol   (Plan TAP blocks for post-operative pain control)    Anesthetic plan, all risks, benefits, and alternatives have been provided, discussed and informed consent has been obtained with: patient.        CODE STATUS:

## 2022-03-01 NOTE — PLAN OF CARE
Goal Outcome Evaluation:  Plan of Care Reviewed With: patient, spouse        Progress: improving  Outcome Summary: ERAS pt. HR sl tachy at times, but VSS. abd lap sites x 5 cdi with dermabond. ice pack to abd. f/c with adeq urine output. IVF infusing. yuan sips clear liquid. instructed on importance of ambulation and oob to chair. scds on. IS to 2250. good cough effort, splinting with pillow.

## 2022-03-01 NOTE — ANESTHESIA POSTPROCEDURE EVALUATION
Patient: Marleny Palma    Procedure Summary     Date: 03/01/22 Room / Location: [facility] OR [address] [place] [address] MAIN OR    Anesthesia Start: 0726 Anesthesia Stop: 1146    Procedure: MOBILIZATION OF SPLENIC FLEXURE AND SIGMOID COLON RESECTION LAPAROSCOPIC WITH DAVINCI ROBOT (N/A Abdomen) Diagnosis:       Diverticulitis      (Diverticulitis [K57.92])    Surgeons: Alejandro Johnston MD Provider: Tracie Martinez MD    Anesthesia Type: general with block, ERAS Protocol ASA Status: 2          Anesthesia Type: general with block, ERAS Protocol    Vitals  Vitals Value Taken Time   /67 03/01/22 1316   Temp 36.8 °C (98.3 °F) 03/01/22 1140   Pulse 90 03/01/22 1321   Resp 16 03/01/22 1300   SpO2 98 % 03/01/22 1321   Vitals shown include unvalidated device data.        Post Anesthesia Care and Evaluation    Patient location during evaluation: PACU  Patient participation: complete - patient participated  Level of consciousness: awake and alert  Pain management: adequate  Airway patency: patent  Anesthetic complications: No anesthetic complications    Cardiovascular status: acceptable  Respiratory status: acceptable  Hydration status: acceptable    Comments: /73   Pulse 96   Temp 36.8 °C (98.3 °F) (Oral)   Resp 16   SpO2 98%

## 2022-03-01 NOTE — ANESTHESIA PROCEDURE NOTES
Airway  Urgency: elective    Date/Time: 3/1/2022 7:35 AM  Airway not difficult    General Information and Staff    Patient location during procedure: OR  Anesthesiologist: Tracie Martinez MD  CRNA: Cole Richardson CRNA    Indications and Patient Condition  Indications for airway management: airway protection    Preoxygenated: yes  MILS maintained throughout  Mask difficulty assessment: 2 - vent by mask + OA or adjuvant +/- NMBA    Final Airway Details  Final airway type: endotracheal airway      Successful airway: ETT  Cuffed: yes   Successful intubation technique: direct laryngoscopy  Facilitating devices/methods: intubating stylet  Endotracheal tube insertion site: oral  Blade: Salter  Blade size: 2  ETT size (mm): 7.0  Cormack-Lehane Classification: grade I - full view of glottis  Placement verified by: chest auscultation and capnometry   Cuff volume (mL): 6  Measured from: lips  ETT/EBT  to lips (cm): 21  Number of attempts at approach: 1  Assessment: lips, teeth, and gum same as pre-op and atraumatic intubation

## 2022-03-01 NOTE — ANESTHESIA PROCEDURE NOTES
Peripheral Block      Patient reassessed immediately prior to procedure    Patient location during procedure: OR  Start time: 3/1/2022 7:31 AM  Stop time: 3/1/2022 7:48 AM  Reason for block: at surgeon's request and post-op pain management  Performed by  Anesthesiologist: Tracie Martinez MD  Preanesthetic Checklist  Completed: patient identified, IV checked, site marked, risks and benefits discussed, surgical consent, monitors and equipment checked, pre-op evaluation and timeout performed  Prep:  Sterile barriers:cap and gloves  Prep: ChloraPrep  Patient monitoring: blood pressure monitoring, continuous pulse oximetry and EKG  Procedure    Sedation: yes    Guidance:ultrasound guided    ULTRASOUND INTERPRETATION. Using ultrasound guidance a 22 G and 21 G gauge needle was placed in close proximity to the nerve, at which point, under ultrasound guidance anesthetic was injected in the area of the nerve and spread of the anesthesia was seen on ultrasound in close proximity thereto.  There were no abnormalities seen on ultrasound; a digital image was taken; and the patient tolerated the procedure with no complications. Images:still images obtained, printed/placed on chart    Laterality:Bilateral  Block Type:TAP  Injection Technique:single-shot  Needle Type:echogenic  Needle Gauge:22 G      Medications Used: bupivacaine liposome (EXPAREL) 1.3 % injection, 20 mL  bupivacaine PF (MARCAINE) 0.25 % injection, 36 mL  Med administered at 3/1/2022 7:43 AM      Post Assessment  Injection Assessment: negative aspiration for heme, no paresthesia on injection and incremental injection  Patient Tolerance:comfortable throughout block  Complications:no  Additional Notes  Ultrasound guidance was used to confirm needle placement and to verify medication disbursement

## 2022-03-02 LAB
ANION GAP SERPL CALCULATED.3IONS-SCNC: 8.6 MMOL/L (ref 5–15)
APTT PPP: 27.9 SECONDS (ref 22.7–35.4)
BASOPHILS # BLD AUTO: 0.03 10*3/MM3 (ref 0–0.2)
BASOPHILS NFR BLD AUTO: 0.4 % (ref 0–1.5)
BUN SERPL-MCNC: 6 MG/DL (ref 6–20)
BUN/CREAT SERPL: 10.2 (ref 7–25)
CALCIUM SPEC-SCNC: 9 MG/DL (ref 8.6–10.5)
CHLORIDE SERPL-SCNC: 105 MMOL/L (ref 98–107)
CO2 SERPL-SCNC: 26.4 MMOL/L (ref 22–29)
CREAT SERPL-MCNC: 0.59 MG/DL (ref 0.57–1)
DEPRECATED RDW RBC AUTO: 40.7 FL (ref 37–54)
EGFRCR SERPLBLD CKD-EPI 2021: 116.3 ML/MIN/1.73
EOSINOPHIL # BLD AUTO: 0.04 10*3/MM3 (ref 0–0.4)
EOSINOPHIL NFR BLD AUTO: 0.5 % (ref 0.3–6.2)
ERYTHROCYTE [DISTWIDTH] IN BLOOD BY AUTOMATED COUNT: 12.5 % (ref 12.3–15.4)
GLUCOSE SERPL-MCNC: 98 MG/DL (ref 65–99)
HCT VFR BLD AUTO: 33 % (ref 34–46.6)
HGB BLD-MCNC: 11 G/DL (ref 12–15.9)
IMM GRANULOCYTES # BLD AUTO: 0.04 10*3/MM3 (ref 0–0.05)
IMM GRANULOCYTES NFR BLD AUTO: 0.5 % (ref 0–0.5)
INR PPP: 1.03 (ref 0.9–1.1)
LAB AP CASE REPORT: NORMAL
LYMPHOCYTES # BLD AUTO: 1.58 10*3/MM3 (ref 0.7–3.1)
LYMPHOCYTES NFR BLD AUTO: 19.7 % (ref 19.6–45.3)
MAGNESIUM SERPL-MCNC: 2.2 MG/DL (ref 1.6–2.6)
MCH RBC QN AUTO: 30 PG (ref 26.6–33)
MCHC RBC AUTO-ENTMCNC: 33.3 G/DL (ref 31.5–35.7)
MCV RBC AUTO: 89.9 FL (ref 79–97)
MONOCYTES # BLD AUTO: 0.78 10*3/MM3 (ref 0.1–0.9)
MONOCYTES NFR BLD AUTO: 9.7 % (ref 5–12)
NEUTROPHILS NFR BLD AUTO: 5.56 10*3/MM3 (ref 1.7–7)
NEUTROPHILS NFR BLD AUTO: 69.2 % (ref 42.7–76)
NRBC BLD AUTO-RTO: 0 /100 WBC (ref 0–0.2)
PATH REPORT.FINAL DX SPEC: NORMAL
PATH REPORT.GROSS SPEC: NORMAL
PLATELET # BLD AUTO: 346 10*3/MM3 (ref 140–450)
PMV BLD AUTO: 10.5 FL (ref 6–12)
POTASSIUM SERPL-SCNC: 4.2 MMOL/L (ref 3.5–5.2)
PROTHROMBIN TIME: 13.4 SECONDS (ref 11.7–14.2)
RBC # BLD AUTO: 3.67 10*6/MM3 (ref 3.77–5.28)
SODIUM SERPL-SCNC: 140 MMOL/L (ref 136–145)
WBC NRBC COR # BLD: 8.03 10*3/MM3 (ref 3.4–10.8)

## 2022-03-02 PROCEDURE — 25010000002 HYDROMORPHONE PER 4 MG: Performed by: COLON & RECTAL SURGERY

## 2022-03-02 PROCEDURE — 85730 THROMBOPLASTIN TIME PARTIAL: CPT | Performed by: COLON & RECTAL SURGERY

## 2022-03-02 PROCEDURE — 25010000002 ENOXAPARIN PER 10 MG: Performed by: COLON & RECTAL SURGERY

## 2022-03-02 PROCEDURE — 85610 PROTHROMBIN TIME: CPT | Performed by: COLON & RECTAL SURGERY

## 2022-03-02 PROCEDURE — 83735 ASSAY OF MAGNESIUM: CPT | Performed by: COLON & RECTAL SURGERY

## 2022-03-02 PROCEDURE — 85025 COMPLETE CBC W/AUTO DIFF WBC: CPT | Performed by: COLON & RECTAL SURGERY

## 2022-03-02 PROCEDURE — 80048 BASIC METABOLIC PNL TOTAL CA: CPT | Performed by: COLON & RECTAL SURGERY

## 2022-03-02 RX ORDER — CYCLOBENZAPRINE HCL 10 MG
10 TABLET ORAL 3 TIMES DAILY PRN
Status: DISCONTINUED | OUTPATIENT
Start: 2022-03-02 | End: 2022-03-03 | Stop reason: HOSPADM

## 2022-03-02 RX ORDER — METHOCARBAMOL 750 MG/1
750 TABLET, FILM COATED ORAL EVERY 8 HOURS PRN
Status: DISCONTINUED | OUTPATIENT
Start: 2022-03-02 | End: 2022-03-02

## 2022-03-02 RX ADMIN — ACETAMINOPHEN 1000 MG: 500 TABLET ORAL at 12:26

## 2022-03-02 RX ADMIN — HYDROMORPHONE HYDROCHLORIDE 0.25 MG: 1 INJECTION, SOLUTION INTRAMUSCULAR; INTRAVENOUS; SUBCUTANEOUS at 14:59

## 2022-03-02 RX ADMIN — HYDROMORPHONE HYDROCHLORIDE 0.25 MG: 1 INJECTION, SOLUTION INTRAMUSCULAR; INTRAVENOUS; SUBCUTANEOUS at 05:17

## 2022-03-02 RX ADMIN — ACETAMINOPHEN 1000 MG: 500 TABLET ORAL at 23:54

## 2022-03-02 RX ADMIN — CELECOXIB 200 MG: 200 CAPSULE ORAL at 20:05

## 2022-03-02 RX ADMIN — HYDROMORPHONE HYDROCHLORIDE 0.25 MG: 1 INJECTION, SOLUTION INTRAMUSCULAR; INTRAVENOUS; SUBCUTANEOUS at 20:05

## 2022-03-02 RX ADMIN — CYCLOBENZAPRINE 10 MG: 10 TABLET, FILM COATED ORAL at 16:19

## 2022-03-02 RX ADMIN — METHOCARBAMOL TABLETS 750 MG: 750 TABLET, COATED ORAL at 12:26

## 2022-03-02 RX ADMIN — ACETAMINOPHEN 1000 MG: 500 TABLET ORAL at 05:17

## 2022-03-02 RX ADMIN — GABAPENTIN 600 MG: 300 CAPSULE ORAL at 20:05

## 2022-03-02 RX ADMIN — GABAPENTIN 600 MG: 300 CAPSULE ORAL at 08:15

## 2022-03-02 RX ADMIN — PANTOPRAZOLE SODIUM 40 MG: 40 TABLET, DELAYED RELEASE ORAL at 05:22

## 2022-03-02 RX ADMIN — ENOXAPARIN SODIUM 40 MG: 100 INJECTION SUBCUTANEOUS at 08:15

## 2022-03-02 RX ADMIN — CELECOXIB 200 MG: 200 CAPSULE ORAL at 08:15

## 2022-03-02 RX ADMIN — DESVENLAFAXINE SUCCINATE 100 MG: 50 TABLET, EXTENDED RELEASE ORAL at 08:15

## 2022-03-02 RX ADMIN — ACETAMINOPHEN 1000 MG: 500 TABLET ORAL at 18:59

## 2022-03-02 RX ADMIN — HYDROMORPHONE HYDROCHLORIDE 0.25 MG: 1 INJECTION, SOLUTION INTRAMUSCULAR; INTRAVENOUS; SUBCUTANEOUS at 09:54

## 2022-03-02 NOTE — PLAN OF CARE
Goal Outcome Evaluation:  Plan of Care Reviewed With: patient        Progress: improving  Outcome Summary: lap sies x 5 with dermabond CDI.  Scheduled tylenol and gabapentin given as well as prn IV Dilaudid.  rubio with good output.  ambulated in the dong.  labs ordered this am

## 2022-03-02 NOTE — PROGRESS NOTES
Progress Note    Pod 1      S: positive flatus,  positive BM. positive ambulating. Pain controlled with ERAS and occasional dilaudid.  Tolerating GI soft diet.    O:  Temp:  [97.2 °F (36.2 °C)-98.3 °F (36.8 °C)] 98.3 °F (36.8 °C)  Heart Rate:  [] 93  Resp:  [16] 16  BP: ()/(54-73) 105/66      Intake/Output Summary (Last 24 hours) at 3/2/2022 1227  Last data filed at 3/2/2022 1004  Gross per 24 hour   Intake 1530 ml   Output 2800 ml   Net -1270 ml       General: pt sitting up in chair  Abd:   soft, mildly distended  Incisions: clean, dry, intact, no erythema      Results from last 7 days   Lab Units 03/02/22  0728   WBC 10*3/mm3 8.03   HEMOGLOBIN g/dL 11.0*   HEMATOCRIT % 33.0*   PLATELETS 10*3/mm3 346     Results from last 7 days   Lab Units 03/02/22  0728   SODIUM mmol/L 140   POTASSIUM mmol/L 4.2   CHLORIDE mmol/L 105   CO2 mmol/L 26.4   BUN mg/dL 6   CREATININE mg/dL 0.59   EGFR mL/min/1.73 116.3   GLUCOSE mg/dL 98   CALCIUM mg/dL 9.0       Results from last 7 days   Lab Units 03/02/22  0728   MAGNESIUM mg/dL 2.2         A/P: 42 y.o. female with s/p MOBILIZATION OF SPLENIC FLEXURE AND SIGMOID COLON RESECTION LAPAROSCOPIC WITH DAVINCI ROBOT    -d/c rubio  -d/c entereg  -Robaxin for muscle spasm pain  -Continue ambulation  -Likely home later today or tomorrow  -Saline lock IV  -Discussed plan with bedside nurse Susan Samaniego PA-C  Physician Assistant  Colorectal and General Surgery  3/2/2022  12:35 EST

## 2022-03-02 NOTE — PAYOR COMM NOTE
"Julia Palma (42 y.o. Female)     PLEASE SEE ATTACHED FOR INPT NOTIFICATION OF ADMISSION.     REF#[id number]    PLEASE CALL   OR  [phone] WITH INPT APPROVED DAYS      THANK YOU    ISHA BRAN LPN CCP            Date of Birth Social Security Number Address Home Phone MRN    1980  [address] [facility] [phone] [id number]    Latter day Marital Status             Anglican        Admission Date Admission Type Admitting Provider Attending Provider Department, Room/Bed    3/1/22 Elective Alejandro Johnston MD Allen, Nechol L, MD [address], 77/1    Discharge Date Discharge Disposition Discharge Destination                         Attending Provider: Alejandro Johnston MD    Allergies: Ceftin [Cefuroxime Axetil], Vancomycin, Oxycodone-acetaminophen    Isolation: None   Infection: None   Code Status: CPR   Advance Care Planning Activity    Ht: 167.6 cm (66\")   Wt: 64.4 kg (142 lb)    Admission Cmt: None   Principal Problem: Diverticulitis [K57.92] More...                 Active Insurance as of 3/1/2022     Primary Coverage     Payor Plan Insurance Group Employer/Plan Group    [place] [id number]     Payor Plan Address Payor Plan Phone Number Payor Plan Fax Number Effective Dates    [employer] [address] [address] [phone]  1/1/2020 - None Entered    [facility] [id number]       Subscriber Name Subscriber Birth Date Member ID       JULIA PALMA 1980 [id number]                 Emergency Contacts      (Rel.) Home Phone Work Phone Mobile Phone    Sarmad Palma (Spouse) [phone] -- [phone]               History & Physical      Alejandro Johnston MD at 03/01/22 0717          Julia Palma is a 41 y.o. female who is seen as a consult at the request of Meredith Lea Kehrer, MD for Consult, Diverticulitis, Abdominal Pain, and Constipation.       HPI:  Complains of diverticulitis flares every 3 to 6 months " since 2019  Has been hospitalized twice  Has had abscess once, in March 2020, underwent CT-guided drainage  Underwent colonoscopy in March 2021 which showed diverticulitis but no evidence of inflammatory bowel disease  During her last hospitalization, a surgeon saw her and offered to operate, but she is here for a second opinion  Her symptoms are unchanged at this time since the colonoscopy in March 2021  When she is having a flare, her abdominal pain is exacerbated with Valsalva  Reports reduced appetite over the last 6 months  Reports that her father had a ruptured diverticulum in his 30s or 40s  Complains of constipation since 2019 when these flares started, has worsened over time  takes miralax daily, which sometimes helps.  Often has to take correctol  in addition.  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   • Colitis 10/25/2021     SEEN AT [facility] ER   • Cystitis 04/2021   • Depression     • Diverticulitis 11/01/2021     ADMITTED TO [facility]   • Diverticulosis     • Dyspareunia in female 07/2014   • Dysuria 02/2021   • Elevated blood pressure reading without diagnosis of hypertension 06/2019   • Environmental allergies     • Fatigue 08/2021   • Geographic tongue     • Hemorrhagic cyst of ovary 10/2016   • Hiatal hernia 03/2021   • Hidradenitis axillaris     • Hyperlipidemia     • Irritable bowel syndrome     • Levator spasm 10/2015   • LLQ pain 08/2014   • Mallet deformity of right middle finger 05/08/2016     SEEN AT    • Migraines     • MRSA infection 2011      BILATERAL AXILLA AND GROIN   • Muscle spasm of back 06/2019   • Nasal fracture 2009   • Nasal turbinate hypertrophy     • Outlet dysfunction constipation     • Panic attacks     • Perineocele 10/2015   • Rectocele 07/2014   • Scalp abscess 05/07/2016     SEEN AT    • Scoliosis     • Seasonal allergies     • Sigmoid diverticulitis 01/11/2021     SEEN AT [facility] ER   • Strain of shoulder, right 09/2020   • Stress incontinence of urine 07/2014   • Uterovaginal prolapse 08/2014            Surgical History         Past Surgical History:   Procedure Laterality Date   • ANTERIOR AND POSTERIOR VAGINAL REPAIR W/ SACROSPINOUS LIGAMENT SUSPENSION N/A 08/06/2014     DR. ROSELYN COLVIN AT [facility]   • AXILLARY HIDRADENITIS EXCISION Bilateral 11/16/2011     DR. MARBIN CARY AT [facility]   • ENDOSCOPY AND COLONOSCOPY N/A 03/03/2021     3 CM HIATAL HERNIA, SCATTERED DIVERTICULA IN ENTIRE COLON, SMALL INTERNAL HEMORRHOIDS, DR. YASHIRA SHRESTHA AT [facility]   • HYSTERECTOMY N/A 08/06/2014     WITH RECTOCELE REPAIR, BSO, DR. ROSELYN COLVIN AT [facility]   • INCISION AND DRAINAGE ABSCESS   09/04/2013     axilla   • TONSILLECTOMY Bilateral 08/10/2015     DR. LIBRA MANZANO AT [facility]   • TRANSVAGINAL TAPING SUSPENSION N/A 08/06/2014     SLING, DR. ROSELYN COLVIN AT [facility]   • VAGINAL DELIVERY N/A 02/03/2003     DR. ASHLEY PRICE AT [facility]   • VAGINAL DELIVERY N/A 05/13/2005     DR. AVRIL VOGEL AT [facility]   • VAGINAL DELIVERY N/A 09/28/2007     DR. ASHLEY PRICE AT [facility]   • WISDOM TOOTH EXTRACTION Bilateral              Social History:   reports that she has never smoked. She has never used smokeless tobacco. She reports current alcohol use. She reports that she does not use drugs.        Marriage status:            Family History   Problem Relation Age of Onset   • Colon polyps Father     • Hypertension Father     • Arthritis Father     • Hyperlipidemia Father     • Arthritis Sister     • Depression Sister     • Hyperlipidemia Sister     •  Hypertension Sister     • Depression Maternal Grandmother     • Hypertension Maternal Grandmother     • Kidney disease Maternal Grandmother     • Heart attack Maternal Grandmother     • Hypertension Maternal Grandfather     • Cancer Maternal Grandfather     • Skin cancer Maternal Grandfather     • Prostate cancer Maternal Grandfather     • Hypertension Paternal Grandmother     • Scoliosis Paternal Grandmother     • Thyroid disease Paternal Grandmother     • Hypertension Paternal Grandfather     • Stroke Paternal Grandfather     • Diabetes Paternal Grandfather     • Heart attack Paternal Grandfather              Current Outpatient Medications:   •  acetaminophen (TYLENOL) 325 MG tablet, Take 650 mg by mouth., Disp: , Rfl:   •  Chlorcyclizine-Pseudoephed (Stahist AD) 25-60 MG tablet, Take 1 tablet by mouth Daily., Disp: 90 tablet, Rfl: 0  •  chlorhexidine (HIBICLENS) 4 % external liquid, Apply  topically to the appropriate area as directed 2 (Two) Times a Day. Shower With Hibiclens Solution Twice The Day Before Surgery, Disp: 236 mL, Rfl: 0  •  cyclobenzaprine (FLEXERIL) 10 MG tablet, TAKE 1 TABLET BY MOUTH THREE TIMES DAILY AS NEEDED FOR MUSCLE SPASMS, Disp: 90 tablet, Rfl: 2  •  desvenlafaxine (Pristiq) 100 MG 24 hr tablet, Take 1 tablet by mouth Daily., Disp: 90 tablet, Rfl: 1  •  methylcellulose oral powder, Take  by mouth Daily., Disp: , Rfl:   •  ondansetron (ZOFRAN) 4 MG tablet, Take 4 mg by mouth Every 6 (Six) Hours As Needed., Disp: , Rfl:   •  pantoprazole (PROTONIX) 40 MG EC tablet, Take 40 mg by mouth Daily., Disp: , Rfl:   •  polyethylene glycol (MIRALAX) 17 GM/SCOOP powder, MIX 1 CAPFULL OF POWDER IN 8 OZ OF LIQUID AND DRINK 1 TO 2 TIMES DAILY, Disp: , Rfl:   •  metroNIDAZOLE (Flagyl) 500 MG tablet, Take 1 tablet by mouth See Admin Instructions for 3 doses. Take one tablet at 2 PM, one tablet at 3 PM, and one tablet at 10 PM the day prior to surgery., Disp: 3 tablet, Rfl: 0  •  neomycin (MYCIFRADIN) 500  MG tablet, Take 2 tablets by mouth Take As Directed for 3 doses. Take two tablets at 2 PM, two tablets at 3 PM, and two tablets at 10 PM the day prior to surgery., Disp: 3 tablet, Rfl: 0     Allergy  Ceftin [cefuroxime axetil], Vancomycin, Oxycodone-acetaminophen, and Ultane [sevoflurane]     Review of Systems   Constitutional: Positive for decreased appetite. Negative for weight gain.   HENT: Negative for congestion, hearing loss and hoarse voice.    Eyes: Negative for blurred vision, discharge and visual disturbance.   Cardiovascular: Negative for chest pain, cyanosis and leg swelling.   Respiratory: Negative for cough, shortness of breath, sleep disturbances due to breathing and snoring.    Endocrine: Negative for cold intolerance and heat intolerance.   Hematologic/Lymphatic: Does not bruise/bleed easily.   Skin: Negative for itching, poor wound healing and skin cancer.   Musculoskeletal: Negative for arthritis, back pain, joint pain and joint swelling.   Gastrointestinal: Positive for abdominal pain and constipation. Negative for change in bowel habit and bowel incontinence.   Genitourinary: Negative for bladder incontinence, dysuria and hematuria.   Neurological: Negative for brief paralysis, excessive daytime sleepiness, dizziness, focal weakness, headaches, light-headedness and weakness.   Psychiatric/Behavioral: Negative for altered mental status and hallucinations. The patient does not have insomnia.    Allergic/Immunologic: Negative for HIV exposure and persistent infections.   All other systems reviewed and are negative.            Vitals:     11/19/21 0945   BP: 120/80   Pulse: 100   Temp: 97.8 °F (36.6 °C)   SpO2: 99%      Body mass index is 22.73 kg/m².     Physical Exam  Exam conducted with a chaperone present.   Constitutional:       General: She is not in acute distress.     Appearance: She is well-developed.   HENT:      Head: Normocephalic and atraumatic.      Nose: Nose normal.   Eyes:       Conjunctiva/sclera: Conjunctivae normal.      Pupils: Pupils are equal, round, and reactive to light.   Neck:      Trachea: No tracheal deviation.   Pulmonary:      Effort: Pulmonary effort is normal. No respiratory distress.      Breath sounds: Normal breath sounds.   Abdominal:      General: Bowel sounds are normal. There is no distension.      Palpations: Abdomen is soft.   Musculoskeletal:         General: No deformity. Normal range of motion.      Cervical back: Normal range of motion.   Skin:     General: Skin is warm and dry.   Neurological:      Mental Status: She is alert and oriented to person, place, and time.      Cranial Nerves: No cranial nerve deficit.      Coordination: Coordination normal.      Gait: Gait normal.   Psychiatric:         Behavior: Behavior normal.         Judgment: Judgment normal.            Review of Medical Record:  I reviewed multiple CT abd/ pelvis reports and images from [facility], showing variations on inflammation of sigmoid and descending colon from November 1, 2021 July 27, 2021 April 19, 2020 April 3, 2020     Assessment:  1. Diverticulitis of large intestine with abscess without bleeding          Plan: Discussed with patient that she has had a complicated case and I would recommend a left and sigmoid colectomy.  Depending on blood supply it could end up being a total abdominal colectomy with ileorectal anastomosis.  I discussed this could be done laparoscopically and possible robot-assisted.  I described with patient typical surgical time, postop recovery including pain management, and restrictions. I discussed with patient risks, benefits, and alternatives.  The patient had opportunity to ask questions.  I answered all questions.  Patient understands and wishes to proceed with procedure.      Electronically signed by Alejandro Johnston MD at 03/01/22 0754       Emergency Department Notes    No notes of this type exist for this encounter.             Operative/Procedure Notes (all)      Alejandro Johnston MD at 03/01/22 0804  Version 1 of 1             Surgeon: Alejandro Johnston MD    Surgical  Assistant: Alejandra Dodd PA-C     Preoperative diagnosis: Diverticulitis [K57.92]    Post-Op Diagnosis Codes:     * Diverticulitis [K57.92]    Procedure: MOBILIZATION OF SPLENIC FLEXURE AND SIGMOID COLON RESECTION LAPAROSCOPIC WITH DAVINCI ROBOT, * Panel 2 does not exist *    Estimated Blood Loss: 100ml    Specimens:    Order Name Source Comment Collection Info Order Time   TISSUE PATHOLOGY EXAM Large Intestine, Rectum  Collected By: Alejandro Johnston MD 3/1/2022 10:27 AM     Release to patient   Immediate            Indication:  Marleny Palma is a 42 y.o. female who comes in with Diverticulitis of large intestine with abscess without bleeding    Diverticulitis  Patient understands risks, benefits,and alternatives wishes to proceed.      Procedure Details:  Patient was brought to the operating room, SCD's in place, antibiotics infused. After general anesthesia was achieved the patient had a TAP block done by anesthesia.  The patient was then placed in Yellofins lithotomy and prepped and draped in sterile fashion. Marcaine 0.25% with epinephrine was used as a local infiltration at the trocar sites. First incision was made in the left upper quadrant midclavicular line right below the costal margin and Veress needle was used to gain access into the abdomen and insufflated up to 15 mmHg. A 8 mm trocar was placed in the abdomen, I inspected the abdomen and there did not appear to be any peritoneal disease. I then went ahead and placed 3 other trocars in a diagonal down to a few centimeters away from the right ASIS.  I made theright lower quadrant incision larger to fit the small gel point so that we could put the anvil in the abdomen later and this will be at the extraction site.  I then placed an assistant port in the right midquadrant.  I put the patient in steep  Trendelenburg 6 degrees tilted to the right and docked the robot. I placed all instruments under direct visualization and then I went to the console.  I lifted up the sigmoid colon and found the RICHARD and started dissecting distal to this and found the left ureter and got into the retrorectal space.  The small intestine was adherent to the sigmoid colon.  I used scissors to take these adhesions down sharply.  There was a lot of inflammation to the left side of the pelvis.  It was attached to the suture from her previous hysterectomy. I took all of that adhesions down sharply with scissors.      Once I got as much of the dissection done with the scissors, I then exchanged them out for the Synch or seal.  I took the RICHARD with multiple burns and then started taking the lateral stalk with the vessel sealer.  I got the posterior mesentery of the rectum distal to the inflamed area and used the vessel sealer to take that.  Once I got it cleaned off I used 1 fires of green load 60 mm stapler  I then tried cleaning off the mesentery of the distal descending as my proximal margin.  I gave ICG and just distal to the transition point made a colotomy.  I did a small burn in the anterior colon to bring the anvil out.  I then closed the colotomy with 2-0 Vicryl and stapled just proximal to this and then put the colon specimen in the right lower quadrant.  I then brought the anvil and colon down to the rectum.  The assistant went below and brought up the EEA sizers.  We could not get the largest sizer up because of the smaller size of her rectum.  I could not get it all the way up to my staple line either.  So I took another 5 cm of upper rectum with another green load.  I took the mesentery with the sink or seal.  And then the 28 stapler, brought the pin out anterior and on the left side to the staple line.  The staple line of the rectum ended up being more on the right side of the anastomosis. I brought the anvil down to the pin.   The assistant started to close the stapler and made sure the orientation of the conduit was correct and then once the stapler was in the green we held for several seconds and then fired the stapler.  We tested the anastomosis with a flexible sigmoidoscope insufflation and the anastomosis under water and there was no evidence of leak.  I then placed Tisseel around the anastomosis.   I undocked the robot and went back to the patient and brought the specimen out through the gel port.  I then closed the 12 trocar fascia site with 2-0 Vicryl.  All skin was closed with 4-0 Monocryl.    All instrument, lap counts and needle counts were correct. The patient was stable throughout the entire case and was taken to recovery.    Assistant: Alejandra Dodd PA-C was responsible for performing the following activities: Retraction, Suction, Irrigation, Suturing, Closing and Placing Dressing and their skilled assistance was necessary for the success of this case    Electronically signed by Alejandro Johnston MD at 03/01/22 6642       Physician Progress Notes (all)    No notes of this type exist for this encounter.         Consult Notes (all)    No notes of this type exist for this encounter.

## 2022-03-02 NOTE — PLAN OF CARE
Goal Outcome Evaluation:  Plan of Care Reviewed With: patient, mother        Progress: improving  Outcome Summary: lap x 5 with dermabond cdi, ailyn. sched apap and gabapentin without much pain relief, still rating 7-8/10 and requesting dilaudid IV  occasionally for abd pain which does give better relief. pt requested PO pain med, but not ordered. Pt's home flexeril ordered and given. up ad vidya, IV S/L'd, f/c dc'd and voiding without difficulty. yuan GI soft diet and had loose BMs starting this AM and has had multiple so never had post-op dose of Entereg.

## 2022-03-02 NOTE — PROGRESS NOTES
Patient doing well  She had bowel movement already  Afebrile vital signs stable     Abdomen: Soft slightly distended    Plan: Hep-Lock  Likely home tomorrow

## 2022-03-02 NOTE — CASE MANAGEMENT/SOCIAL WORK
Discharge Planning Assessment  [facility]     Patient Name: Marleny Palma  MRN: [id number]  Today's Date: 3/2/2022    Admit Date: 3/1/2022     Discharge Needs Assessment     Row Name 03/02/22 2364       Living Environment    Lives With spouse    Name(s) of Who Lives With Patient Sarmad Palma  [phone]    Current Living Arrangements home/apartment/condo    Primary Care Provided by self    Provides Primary Care For no one    Family Caregiver if Needed spouse    Family Caregiver Names Sarmad Palma  [phone]    Quality of Family Relationships unable to assess    Able to Return to Prior Arrangements yes       Resource/Environmental Concerns    Resource/Environmental Concerns none    Transportation Concerns car, none       Transition Planning    Patient/Family Anticipates Transition to home with family    Patient/Family Anticipated Services at Transition none    Transportation Anticipated family or friend will provide       Discharge Needs Assessment    Readmission Within the Last 30 Days no previous admission in last 30 days    Equipment Currently Used at Home none    Concerns to be Addressed no discharge needs identified; denies needs/concerns at this time    Anticipated Changes Related to Illness none    Equipment Needed After Discharge none    Provided Post Acute Provider List? N/A    N/A Provider List Comment No needs identified    Provided Post Acute Provider Quality & Resource List? N/A               Discharge Plan     Row Name 03/02/22 3515       Plan    Plan Home with family support    Patient/Family in Agreement with Plan yes    Plan Comments Met with patient and mom at bedside. Patient granted me permission to speak to her while her mom remained in the room.  Face sheet verified. Prior to admission patient was independent with all aspects of her ADL’s.  Patient denies using any special or adaptive equipment. Patient uses the [facility] in [place], [place], denies any issues affording or remembering  to take her medications. Patient is agreeable to using Meds to Beds. Patient does not have POA or living will but states Sarmad Palma  [phone] knows her wishes. Discharge plans discussed, plan is to return home with family support. No additional needs identified.  Family will transport. Will continue to monitor for new or changing discharge needs.  Marguerite Cruz RN CCP              Continued Care and Services - Admitted Since 3/1/2022    Coordination has not been started for this encounter.       Expected Discharge Date and Time     Expected Discharge Date Expected Discharge Time    Mar 3, 2022          Demographic Summary     Row Name 03/02/22 1549       General Information    Admission Type inpatient    Arrived From emergency department    Referral Source admission list    Reason for Consult discharge planning    Preferred Language English     Used During This Interaction no       Contact Information    Permission Granted to Share Info With family/designee  Sarmad Palma  [phone]               Functional Status     Row Name 03/02/22 1549       Functional Status    Usual Activity Tolerance excellent    Current Activity Tolerance good       Functional Status, IADL    Medications independent    Meal Preparation independent    Housekeeping independent    Laundry independent    Shopping independent       Mental Status    General Appearance WDL WDL       Mental Status Summary    Recent Changes in Mental Status/Cognitive Functioning no changes       Employment/    Employment Status employed part-time               Psychosocial    No documentation.                Abuse/Neglect    No documentation.                Legal    No documentation.                Substance Abuse    No documentation.                Patient Forms    No documentation.                   Marguerite Cruz RN

## 2022-03-03 ENCOUNTER — READMISSION MANAGEMENT (OUTPATIENT)
Dept: CALL CENTER | Facility: HOSPITAL | Age: 42
End: 2022-03-03

## 2022-03-03 VITALS
HEIGHT: 66 IN | OXYGEN SATURATION: 100 % | TEMPERATURE: 97.8 F | DIASTOLIC BLOOD PRESSURE: 80 MMHG | BODY MASS INDEX: 22.82 KG/M2 | RESPIRATION RATE: 16 BRPM | HEART RATE: 88 BPM | SYSTOLIC BLOOD PRESSURE: 115 MMHG | WEIGHT: 142 LBS

## 2022-03-03 PROCEDURE — 25010000002 ENOXAPARIN PER 10 MG: Performed by: COLON & RECTAL SURGERY

## 2022-03-03 PROCEDURE — 25010000002 HYDROMORPHONE PER 4 MG: Performed by: COLON & RECTAL SURGERY

## 2022-03-03 RX ORDER — HYDROCODONE BITARTRATE AND ACETAMINOPHEN 5; 325 MG/1; MG/1
TABLET ORAL
Qty: 24 TABLET | Refills: 0 | Status: SHIPPED | OUTPATIENT
Start: 2022-03-03 | End: 2022-03-09

## 2022-03-03 RX ADMIN — ENOXAPARIN SODIUM 40 MG: 100 INJECTION SUBCUTANEOUS at 08:19

## 2022-03-03 RX ADMIN — ACETAMINOPHEN 1000 MG: 500 TABLET ORAL at 06:06

## 2022-03-03 RX ADMIN — CELECOXIB 200 MG: 200 CAPSULE ORAL at 08:19

## 2022-03-03 RX ADMIN — HYDROMORPHONE HYDROCHLORIDE 0.25 MG: 1 INJECTION, SOLUTION INTRAMUSCULAR; INTRAVENOUS; SUBCUTANEOUS at 09:53

## 2022-03-03 RX ADMIN — HYDROMORPHONE HYDROCHLORIDE 0.25 MG: 1 INJECTION, SOLUTION INTRAMUSCULAR; INTRAVENOUS; SUBCUTANEOUS at 00:12

## 2022-03-03 RX ADMIN — GABAPENTIN 600 MG: 300 CAPSULE ORAL at 09:53

## 2022-03-03 RX ADMIN — DESVENLAFAXINE SUCCINATE 100 MG: 50 TABLET, EXTENDED RELEASE ORAL at 08:19

## 2022-03-03 RX ADMIN — ACETAMINOPHEN 1000 MG: 500 TABLET ORAL at 11:48

## 2022-03-03 RX ADMIN — CYCLOBENZAPRINE 10 MG: 10 TABLET, FILM COATED ORAL at 08:19

## 2022-03-03 RX ADMIN — PANTOPRAZOLE SODIUM 40 MG: 40 TABLET, DELAYED RELEASE ORAL at 06:06

## 2022-03-03 RX ADMIN — HYDROMORPHONE HYDROCHLORIDE 0.25 MG: 1 INJECTION, SOLUTION INTRAMUSCULAR; INTRAVENOUS; SUBCUTANEOUS at 06:06

## 2022-03-03 NOTE — PLAN OF CARE
Goal Outcome Evaluation:  Plan of Care Reviewed With: patient        Progress: improving  Outcome Summary: VSS. lap sites x 5 with dermabond CDI.  voiding freely. Had some loose small BM thru the night.  Passing flatus.  Taking Sched tylenol and prn IV dilaudid.  for discharge today

## 2022-03-03 NOTE — PLAN OF CARE
Goal Outcome Evaluation:  VSS, lap sites x 4 & 1 small incision all dry & intact with liquid skin adhesive, voiding w/o difficulty, good pain control with iv pain med & tylenol, several BMs today, less than yesterday, no N/V, tolerating GI soft diet, up in chair, up ad vidya, d/c home  Plan of Care Reviewed With: patient

## 2022-03-03 NOTE — PROGRESS NOTES
Progress Note    Pod 2      S: BM x3 last night. Positive ambulating. Still complaining of pain; received dilaudid 3 times overnight. Says flexeril did not provide significant relief yesterday; is going to try it again today. Tolerating regular diet. No nausea. Feels ready to go home, but is requesting oral pain medication for discharge.    O:  Temp:  [97 °F (36.1 °C)-98.2 °F (36.8 °C)] 97.5 °F (36.4 °C)  Heart Rate:  [78-99] 92  Resp:  [16-18] 16  BP: (103-116)/(57-77) 113/71      Intake/Output Summary (Last 24 hours) at 3/3/2022 0930  Last data filed at 3/3/2022 0925  Gross per 24 hour   Intake 940 ml   Output 2600 ml   Net -1660 ml       Abd:   soft, mildly distended  Incisions: clean, dry, intact, no erythema      A/P: 42 y.o. female with s/p MOBILIZATION OF SPLENIC FLEXURE AND SIGMOID COLON RESECTION LAPAROSCOPIC WITH DAVINCI ROBOT    -Dr. Johnston will see patient before likely discharge home today      Rosa Maria Samaniego PA-C  Physician Assistant  Colorectal and General Surgery  3/3/2022  09:38 EST

## 2022-03-03 NOTE — PROGRESS NOTES
Continued Stay Note  [facility]     Patient Name: Marleny Palma  MRN: [id number]  Today's Date: 3/3/2022    Admit Date: 3/1/2022     Discharge Plan     Row Name 03/03/22 1252       Plan    Plan Home no needs    Plan Comments Discharged order noted. Met with patient who confirmed DC plan is home. Stated her mother will assist as needed and will provide transportation at DC. Denies any needs/equipment.               Discharge Codes    No documentation.               Expected Discharge Date and Time     Expected Discharge Date Expected Discharge Time    Mar 3, 2022             Ailyn Murphy RN

## 2022-03-03 NOTE — PROGRESS NOTES
Case Management Discharge Note      Final Note: Discharged home. Ailyn Murphy RN    Provided Post Acute Provider List?: N/A  N/A Provider List Comment: No needs identified  Provided Post Acute Provider Quality & Resource List?: N/A    Transportation Services  Private: Car    Final Discharge Disposition Code: 01 - home or self-care

## 2022-03-03 NOTE — DISCHARGE SUMMARY
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   • Colitis 10/25/2021    SEEN AT [facility] ER   • Cystitis 04/2021   • Depression    • Diverticulitis 11/01/2021    ADMITTED TO [facility]   • Diverticulosis    • Dysuria 02/2021   • Elevated blood pressure reading without diagnosis of hypertension 06/2019   • Environmental allergies    • Fatigue 08/2021   • Geographic tongue    • Hiatal hernia 03/2021   • Hidradenitis axillaris    • Hyperlipidemia     DIET CONTROL   • Irritable bowel syndrome    • Levator spasm 10/2015   • LLQ pain 08/2014   • Mallet deformity of right middle finger 05/08/2016    SEEN AT    • Migraines    • MRSA infection 2011    BILATERAL AXILLA AND GROIN WAS TREATED BY URGENT CARE IN [facility]   • Muscle spasm of back 06/2019   • Nasal fracture 2009   • Nasal turbinate hypertrophy    • Outlet dysfunction constipation    • Panic attacks    • Perineocele 10/2015   • Rectocele 07/2014   • Scoliosis    • Sigmoid diverticulitis 01/11/2021    SEEN AT [facility] ER   • Spinal headache     after epidural       Procedures Performed  Procedure(s):  MOBILIZATION OF SPLENIC FLEXURE AND SIGMOID COLON RESECTION LAPAROSCOPIC WITH DAVINCI ROBOT      Hospital Course  Patient is a 42 y.o. female who presented with diverticulitis. She underwent above procedure and made an uneventful recovery. The patient's diet was advanced from clears to regular and tolerated well.  The Scott was removed and the patient voided without difficulty. She had some postoperative pain that was controlled with ERAS and occasional dilaudid.        Consults:   none    Discharge Disposition  Home or Self Care    Discharge Medications     Discharge Medications      Changes to Medications      Instructions Start Date   HYDROcodone-acetaminophen 5-325 MG per tablet  Commonly known as: NORCO  What changed:   · how much to take  · how to take this  · when to take this  · reasons to take this  · additional instructions   Take 1-2 tabs by mouth every 6 hrs as needed for pain         Continue These Medications      Instructions Start Date   acetaminophen 325 MG tablet  Commonly known as: TYLENOL   650 mg, Oral, Every 4 Hours PRN      cyclobenzaprine 10 MG tablet  Commonly known as: FLEXERIL   10 mg, Oral, 3 Times Daily PRN, for muscle spams      desvenlafaxine 100 MG 24 hr tablet  Commonly known as: Pristiq   100 mg, Oral, Daily      ibuprofen 800 MG tablet  Commonly known as: ADVIL,MOTRIN   800 mg, Oral, Every 6 Hours PRN, HOLD PRIOR TO SURG      ondansetron ODT 8 MG disintegrating tablet  Commonly known as: ZOFRAN-ODT   8 mg, Translingual, Every 8 Hours PRN      pantoprazole 40 MG EC tablet  Commonly known as: PROTONIX   40 mg, Oral, Daily      Stahist AD 25-60 MG tablet  Generic drug: Chlorcyclizine-Pseudoephed   1 tablet, Oral, Daily         Stop These Medications    methylcellulose oral powder     polyethylene glycol 17 GM/SCOOP powder  Commonly known as: MIRALAX            Discharge Diet:   Diet Instructions     Diet: Regular      Discharge Diet: Regular          Activity at Discharge:   Activity Instructions     Driving Restrictions      Type of Restriction: Driving    Driving Restrictions: No Driving Until Next Appointment    Gradually Increase Activity Until at Pre-Hospitalization Level      Lifting Restrictions      Type of Restriction: Lifting    Lifting Restrictions: Lifting Restriction (Indicate Limit)    Weight Limit (Pounds): 15    Length of Lifting Restriction: until office appt          Follow-up Appointments  Future Appointments   Date Time  Provider Department [place]   3/21/2022 10:00 AM Alejandro Johnston MD [facility]  [facility]     Additional Instructions for the Follow-ups that You Need to Schedule     Call MD With Problems / Concerns   As directed      Instructions: temp >101. Drainage from wound. vomitting.    Order Comments: Instructions: temp >101. Drainage from wound. vomitting.          Discharge Follow-up with Specified Provider: Preston; 2 Weeks   As directed      To: Preston    Follow Up: 2 Weeks               Discharge Diet:   As tolerated  Activity at Discharge:   See above  Follow-up Appointments  2 wks

## 2022-03-03 NOTE — PAYOR COMM NOTE
"Julia Palma (42 y.o. Female)     PLEASE SEE ATTACHED DC SUMMARY    REF#[id number]    THANK YOU    ISHA BRAN LPN CCP            Date of Birth Social Security Number Address Home Phone MRN    1980  [address] [facility] [address] [phone] [id number]    Scientologist Marital Status             Congregation        Admission Date Admission Type Admitting Provider Attending Provider Department, Room/Bed    3/1/22 Elective Alejandro Johnston MD  [address], P677/1    Discharge Date Discharge Disposition Discharge Destination          3/3/2022 Home or Self Care              Attending Provider: (none)   Allergies: Ceftin [Cefuroxime Axetil], Vancomycin, Oxycodone-acetaminophen    Isolation: None   Infection: None   Code Status: CPR   Advance Care Planning Activity    Ht: 167.6 cm (66\")   Wt: 64.4 kg (142 lb)    Admission Cmt: None   Principal Problem: Diverticulitis [K57.92] More...                 Active Insurance as of 3/1/2022     Primary Coverage     Payor Plan Insurance Group Employer/Plan Group    [place] [id number]     Payor Plan Address Payor Plan Phone Number Payor Plan Fax Number Effective Dates    [employer] [address] [phone]  1/1/2020 - None Entered    [facility] [id number]       Subscriber Name Subscriber Birth Date Member ID       JULIA PALMA 1980 [id number]                 Emergency Contacts      (Rel.) Home Phone Work Phone Mobile Phone    Sarmad Palma (Spouse) [phone] -- [phone]            Discharge Summary    No notes of this type exist for this encounter.         "

## 2022-03-04 ENCOUNTER — TRANSITIONAL CARE MANAGEMENT TELEPHONE ENCOUNTER (OUTPATIENT)
Dept: CALL CENTER | Facility: HOSPITAL | Age: 42
End: 2022-03-04

## 2022-03-04 NOTE — OUTREACH NOTE
Call Center TCM Note      Responses   [facility] patient discharged from? [place]   Does the patient have one of the following disease processes/diagnoses(primary or secondary)? Other   TCM attempt successful? Yes   Call start time 1219   Call end time 1221   Discharge diagnosis Diverticulitis   Person spoke with today (if not patient) and relationship Patient   Meds reviewed with patient/caregiver? Yes   Is the patient having any side effects they believe may be caused by any medication additions or changes? No   Does the patient have all medications ordered at discharge? Yes   Is the patient taking all medications as directed (includes completed medication regime)? Yes   Comments regarding appointments Post-op fu appt 3/21/22 at 10:00 AM   Does the patient have a primary care provider?  Yes   Does the patient have an appointment with their PCP within 7 days of discharge? No   What is preventing the patient from scheduling follow up appointments within 7 days of discharge? Earlier appointment not available   Nursing Interventions Educated patient on importance of making appointment,  Advised patient to make appointment   Has the patient kept scheduled appointments due by today? N/A   Psychosocial issues? No   Did the patient receive a copy of their discharge instructions? Yes   Nursing interventions Reviewed instructions with patient   What is the patient's perception of their health status since discharge? Improving   Is the patient/caregiver able to teach back signs and symptoms related to disease process for when to call PCP? Yes   Is the patient/caregiver able to teach back signs and symptoms related to disease process for when to call 911? Yes   Is the patient/caregiver able to teach back the hierarchy of who to call/visit for symptoms/problems? PCP, Specialist, Home health nurse, Urgent Care, ED, 911 Yes   If the patient is a current smoker, are they able to teach back resources for cessation? Not a  smoker   TCM call completed? Yes   Wrap up additional comments Pt states she is doing great, and is having very little discomfort. Pt shares she is taking Norco for pain. Pt verified Post-Op fu appt on 3/21/22. No questions/concerns.           Suzanne Coker RN    3/4/2022, 12:23 EST

## 2022-03-04 NOTE — OUTREACH NOTE
Prep Survey      Responses   [facility] facility patient discharged from? [place]   Is LACE score < 7 ? Yes   Emergency Room discharge w/ pulse ox? No   Eligibility [facility]   Date of Admission 03/01/22   Date of Discharge 03/03/22   Discharge Disposition Home or Self Care   Discharge diagnosis Diverticulitis   Does the patient have one of the following disease processes/diagnoses(primary or secondary)? Other   Does the patient have Home health ordered? No   Is there a DME ordered? No   Prep survey completed? Yes          Regla Johnston RN

## 2022-03-09 ENCOUNTER — TELEPHONE (OUTPATIENT)
Dept: SURGERY | Facility: CLINIC | Age: 42
End: 2022-03-09

## 2022-03-09 DIAGNOSIS — K57.20 DIVERTICULITIS OF LARGE INTESTINE WITH ABSCESS WITHOUT BLEEDING: Primary | ICD-10-CM

## 2022-03-09 RX ORDER — HYDROCODONE BITARTRATE AND ACETAMINOPHEN 5; 325 MG/1; MG/1
TABLET ORAL
Qty: 24 TABLET | Refills: 0 | Status: SHIPPED | OUTPATIENT
Start: 2022-03-09 | End: 2022-03-21

## 2022-03-09 NOTE — TELEPHONE ENCOUNTER
Pt called, said she is still in a considerable amount of pain especially in RLQ, wondered if this is normal. Told pt this is normal and she will continue to have pain until fully healed but will lessen or time. Pt says that there is a knot/bump between the 2 lower incisions. She states you cannot see it but she feels it when she presses on that are. Says it is not draining, red, swollen or warm. Pt says bowels are moving well and denies fever.  Pt says she took all of her pain medicine and is unable to tolerate the pain with tylenol/ibuprofen. Wanted to know if she could have a refill.   Last RX given 3-3-22, Hydrocodone 5-325 #24  Pt is allergic to oxycodone  Says she would come in and see Alejandra tomorrow if necessary.

## 2022-03-14 DIAGNOSIS — K57.20 DIVERTICULITIS OF LARGE INTESTINE WITH ABSCESS WITHOUT BLEEDING: Primary | ICD-10-CM

## 2022-03-14 RX ORDER — HYDROCODONE BITARTRATE AND ACETAMINOPHEN 5; 325 MG/1; MG/1
TABLET ORAL
Qty: 24 TABLET | Refills: 0 | Status: SHIPPED | OUTPATIENT
Start: 2022-03-14 | End: 2022-03-21

## 2022-03-18 DIAGNOSIS — S46.911A STRAIN OF RIGHT SHOULDER, INITIAL ENCOUNTER: ICD-10-CM

## 2022-03-18 RX ORDER — CYCLOBENZAPRINE HCL 10 MG
10 TABLET ORAL 3 TIMES DAILY PRN
Qty: 90 TABLET | Refills: 0 | Status: SHIPPED | OUTPATIENT
Start: 2022-03-18 | End: 2022-05-16 | Stop reason: SDUPTHER

## 2022-03-21 ENCOUNTER — OFFICE VISIT (OUTPATIENT)
Dept: SURGERY | Facility: CLINIC | Age: 42
End: 2022-03-21

## 2022-03-21 VITALS
TEMPERATURE: 97.3 F | BODY MASS INDEX: 21.62 KG/M2 | DIASTOLIC BLOOD PRESSURE: 70 MMHG | OXYGEN SATURATION: 98 % | WEIGHT: 134.5 LBS | HEIGHT: 66 IN | HEART RATE: 137 BPM | SYSTOLIC BLOOD PRESSURE: 110 MMHG

## 2022-03-21 DIAGNOSIS — K57.20 DIVERTICULITIS OF LARGE INTESTINE WITH ABSCESS WITHOUT BLEEDING: Primary | ICD-10-CM

## 2022-03-21 PROCEDURE — 99024 POSTOP FOLLOW-UP VISIT: CPT | Performed by: COLON & RECTAL SURGERY

## 2022-03-21 RX ORDER — HYDROCODONE BITARTRATE AND ACETAMINOPHEN 5; 325 MG/1; MG/1
TABLET ORAL
Qty: 24 TABLET | Refills: 0 | Status: SHIPPED | OUTPATIENT
Start: 2022-03-21 | End: 2022-04-15

## 2022-03-23 DIAGNOSIS — J30.1 SEASONAL ALLERGIC RHINITIS DUE TO POLLEN: ICD-10-CM

## 2022-03-23 RX ORDER — CHLORCYCLIZINE HYDROCHLORIDE AND PSEUDOEPHEDRINE HYDROCHLORIDE 25; 60 MG/1; MG/1
1 TABLET ORAL DAILY
Qty: 90 TABLET | Refills: 0 | Status: SHIPPED | OUTPATIENT
Start: 2022-03-23 | End: 2022-06-23

## 2022-03-23 RX ORDER — CHLORCYCLIZINE HYDROCHLORIDE AND PSEUDOEPHEDRINE HYDROCHLORIDE 25; 60 MG/1; MG/1
1 TABLET ORAL DAILY
Qty: 90 TABLET | Refills: 0 | OUTPATIENT
Start: 2022-03-23

## 2022-03-28 ENCOUNTER — DOCUMENTATION (OUTPATIENT)
Dept: SURGERY | Facility: CLINIC | Age: 42
End: 2022-03-28

## 2022-03-28 NOTE — PROGRESS NOTES
[place] paperwork completed and faxed over last week Friday 03/25/2022, message sent to pt via [employer] along with attachment of forms, and mailed to pt as well.

## 2022-04-11 ENCOUNTER — TELEMEDICINE (OUTPATIENT)
Dept: FAMILY MEDICINE CLINIC | Facility: CLINIC | Age: 42
End: 2022-04-11

## 2022-04-11 DIAGNOSIS — F33.1 MODERATE EPISODE OF RECURRENT MAJOR DEPRESSIVE DISORDER: Primary | ICD-10-CM

## 2022-04-11 PROCEDURE — 99213 OFFICE O/P EST LOW 20 MIN: CPT | Performed by: FAMILY MEDICINE

## 2022-04-11 RX ORDER — VILAZODONE HYDROCHLORIDE 10 MG/1
TABLET ORAL
Qty: 53 TABLET | Refills: 0 | Status: SHIPPED | OUTPATIENT
Start: 2022-04-18 | End: 2022-06-30

## 2022-04-11 RX ORDER — VILAZODONE HYDROCHLORIDE 20 MG/1
20 TABLET ORAL DAILY
Qty: 30 TABLET | Refills: 0 | Status: SHIPPED | OUTPATIENT
Start: 2022-05-18 | End: 2022-06-07 | Stop reason: SDUPTHER

## 2022-04-11 RX ORDER — DESVENLAFAXINE 25 MG/1
TABLET, EXTENDED RELEASE ORAL
Qty: 21 TABLET | Refills: 0 | Status: SHIPPED | OUTPATIENT
Start: 2022-04-11 | End: 2022-06-30

## 2022-04-11 NOTE — PROGRESS NOTES
Mode of Visit: Video  Location of patient: other: car  You have chosen to receive care through a telehealth visit.  The patient has signed the video visit consent form.  The visit included audio and video interaction. No technical issues occurred during this visit.     Chief Complaint  Anxiety and Depression    Subjective          Marleny Palma presents to [facility]  Patient presents to discuss her mood.    She is feeling irritable.  She has been feeling anxious.  She thought the Pristiq really helped her first but she would like to try something else.    She was on Lexapro and Wellbutrin in the past.  She denies any SI or HI or hopelessness.  She feels that she had done well with her diverticulitis surgery.    Objective   Vital Signs:   There were no vitals taken for this visit.    Physical Exam   Constitutional: She appears well-developed and well-nourished. No distress.   HENT:   Head: Normocephalic and atraumatic.   Eyes: Conjunctivae are normal.   Psychiatric: She has a normal mood and affect.     Result Review :                 Assessment and Plan    Diagnoses and all orders for this visit:    1. Moderate episode of recurrent major depressive disorder (HCC) (Primary)  -     Desvenlafaxine Succinate ER (Pristiq) 25 MG tablet sustained-release 24 hour; Take 2 tablets by mouth Daily for 7 days, THEN 1 tablet Daily for 7 days.  Dispense: 21 tablet; Refill: 0  -     vilazodone (VIIBRYD) 10 MG tablet tablet; Take 1 tablet by mouth Daily for 7 days, THEN 2 tablets Daily for 23 days.  Dispense: 53 tablet; Refill: 0  -     vilazodone (Viibryd) 20 MG tablet tablet; Take 1 tablet by mouth Daily.  Dispense: 30 tablet; Refill: 0    Moderate major depression-currently active, will do trial of Viibryd with close follow-up, patient informed not to cut her Pristiq and will taper her down with 25 mg tablets    Follow Up   Return in about 6 weeks (around 5/23/2022) for Recheck mood.  Patient was  given instructions and counseling regarding her condition or for health maintenance advice. Please see specific information pulled into the AVS if appropriate.

## 2022-04-12 ENCOUNTER — TELEPHONE (OUTPATIENT)
Dept: FAMILY MEDICINE CLINIC | Facility: CLINIC | Age: 42
End: 2022-04-12

## 2022-04-12 DIAGNOSIS — F33.1 MODERATE EPISODE OF RECURRENT MAJOR DEPRESSIVE DISORDER: ICD-10-CM

## 2022-04-12 NOTE — TELEPHONE ENCOUNTER
Caller: Marleny Palma    Relationship to patient: Self    Best call back number: [phone]    Patient is needing:NEEDS PRIOR AUTHORIZATION ON vilazodone (VIIBRYD) 10 MG tablet tablet

## 2022-04-15 ENCOUNTER — OFFICE VISIT (OUTPATIENT)
Dept: SURGERY | Facility: CLINIC | Age: 42
End: 2022-04-15

## 2022-04-15 VITALS
HEIGHT: 66 IN | OXYGEN SATURATION: 98 % | HEART RATE: 97 BPM | BODY MASS INDEX: 22 KG/M2 | WEIGHT: 136.9 LBS | DIASTOLIC BLOOD PRESSURE: 82 MMHG | SYSTOLIC BLOOD PRESSURE: 116 MMHG | TEMPERATURE: 97.5 F

## 2022-04-15 DIAGNOSIS — K57.20 DIVERTICULITIS OF LARGE INTESTINE WITH ABSCESS WITHOUT BLEEDING: Primary | ICD-10-CM

## 2022-04-15 PROCEDURE — 99024 POSTOP FOLLOW-UP VISIT: CPT | Performed by: COLON & RECTAL SURGERY

## 2022-04-15 NOTE — PROGRESS NOTES
"Marleny Palma is a 42 y.o. female in for follow up of Diverticulitis of large intestine with abscess without bleeding   3/1/22 lap sig for diverticulitis  The patient reports that she is feeling great and she states she is back to normal. She reports that her bowel movements are back to normal. The patient reports she is having approximately 2 to 4 bowel movements a day to every other day, but they are not runny. She reports her appetite has been intermittent. The patient denies any other issues and feels she is recovering well. The patient is planning to return to work on 04/18/2022 and does need a release for work. The patient states she is required to lift 70 lbs. at work and 6 days a week. She feels she can handle the 6 days a week, she was more concerned about the weight lifting. The patient inquired if she needs to continue with a fiber supplement.       /82 (BP Location: Left arm, Patient Position: Sitting, Cuff Size: Small Adult)   Pulse 97   Temp 97.5 °F (36.4 °C)   Ht 167.6 cm (66\")   Wt 62.1 kg (136 lb 14.4 oz)   LMP  (LMP Unknown)   SpO2 98%   Breastfeeding No   BMI 22.10 kg/m²   Body mass index is 22.1 kg/m².      PE:  Physical Exam  Vitals and nursing note reviewed.   Constitutional:       General: She is not in acute distress.     Appearance: Normal appearance. She is well-developed. She is not ill-appearing, toxic-appearing or diaphoretic.   HENT:      Head: Normocephalic and atraumatic.      Right Ear: External ear normal.      Left Ear: External ear normal.   Eyes:      Conjunctiva/sclera: Conjunctivae normal.      Pupils: Pupils are equal, round, and reactive to light.   Neck:      Thyroid: No thyromegaly.      Vascular: No carotid bruit or JVD.   Cardiovascular:      Rate and Rhythm: Normal rate and regular rhythm.      Pulses: Normal pulses.      Heart sounds: Normal heart sounds. No murmur heard.  Pulmonary:      Effort: Pulmonary effort is normal. No respiratory distress.      " Breath sounds: Normal breath sounds.   Abdominal:      General: Bowel sounds are normal.      Palpations: Abdomen is soft. There is no mass.      Tenderness: There is no abdominal tenderness.   Musculoskeletal:         General: No swelling. Normal range of motion.      Cervical back: Normal range of motion and neck supple.   Lymphadenopathy:      Cervical: No cervical adenopathy.   Skin:     General: Skin is warm and dry.      Findings: No lesion or rash.   Neurological:      Mental Status: She is alert and oriented to person, place, and time.      Cranial Nerves: No cranial nerve deficit.      Sensory: No sensory deficit.      Motor: No weakness.      Coordination: Coordination normal.      Gait: Gait normal.      Deep Tendon Reflexes: Reflexes are normal and symmetric.   Psychiatric:         Mood and Affect: Mood normal.         Behavior: Behavior normal.         Thought Content: Thought content normal.         Judgment: Judgment normal.           Assessment:   1. Diverticulitis of large intestine with abscess without bleeding         Plan:  Start fiber.  Okay to return to work with weight restriction for a month.  Follow-up as needed  Transcribed from ambient dictation for Alejandro Johnston MD by Austyn Jansen.  04/15/22   15:10 EDT    Patient verbalized consent to the visit recording.  I have personally performed the services described in this document as transcribed by the above individual, and it is both accurate and complete.  Alejandro Johnston MD  4/21/2022  18:42 EDT

## 2022-04-27 DIAGNOSIS — F33.41 RECURRENT MAJOR DEPRESSIVE DISORDER, IN PARTIAL REMISSION: ICD-10-CM

## 2022-04-27 RX ORDER — DESVENLAFAXINE 100 MG/1
100 TABLET, EXTENDED RELEASE ORAL DAILY
Qty: 90 TABLET | Refills: 1 | OUTPATIENT
Start: 2022-04-27

## 2022-05-16 DIAGNOSIS — S46.911A STRAIN OF RIGHT SHOULDER, INITIAL ENCOUNTER: ICD-10-CM

## 2022-05-17 RX ORDER — CYCLOBENZAPRINE HCL 10 MG
10 TABLET ORAL 3 TIMES DAILY PRN
Qty: 90 TABLET | Refills: 0 | Status: SHIPPED | OUTPATIENT
Start: 2022-05-17 | End: 2022-07-07

## 2022-06-07 DIAGNOSIS — F33.1 MODERATE EPISODE OF RECURRENT MAJOR DEPRESSIVE DISORDER: ICD-10-CM

## 2022-06-07 RX ORDER — VILAZODONE HYDROCHLORIDE 20 MG/1
20 TABLET ORAL DAILY
Qty: 30 TABLET | Refills: 0 | Status: SHIPPED | OUTPATIENT
Start: 2022-06-07 | End: 2022-06-30 | Stop reason: SDUPTHER

## 2022-06-07 NOTE — TELEPHONE ENCOUNTER
Caller: Marleny Palma    Relationship: Self    Best call back number: [phone]    Requested Prescriptions:   Requested Prescriptions     Pending Prescriptions Disp Refills   • vilazodone (Viibryd) 20 MG tablet tablet 30 tablet 0     Sig: Take 1 tablet by mouth Daily.        Pharmacy where request should be sent: [id number] - [address] [place] [address] - [phone]  - [phone]     Additional details provided by patient: HAS 2 PILLS LEFT     Does the patient have less than a 3 day supply:  [x] Yes  [] No    Maritza Chen Rep   06/07/22 16:31 EDT

## 2022-06-23 DIAGNOSIS — J30.1 SEASONAL ALLERGIC RHINITIS DUE TO POLLEN: ICD-10-CM

## 2022-06-23 RX ORDER — CHLORCYCLIZINE HYDROCHLORIDE AND PSEUDOEPHEDRINE HYDROCHLORIDE 25; 60 MG/1; MG/1
1 TABLET ORAL DAILY
Qty: 90 TABLET | Refills: 0 | Status: SHIPPED | OUTPATIENT
Start: 2022-06-23 | End: 2022-09-09 | Stop reason: SDUPTHER

## 2022-06-30 ENCOUNTER — OFFICE VISIT (OUTPATIENT)
Dept: FAMILY MEDICINE CLINIC | Facility: CLINIC | Age: 42
End: 2022-06-30

## 2022-06-30 VITALS
SYSTOLIC BLOOD PRESSURE: 112 MMHG | BODY MASS INDEX: 23.24 KG/M2 | WEIGHT: 144.6 LBS | OXYGEN SATURATION: 100 % | DIASTOLIC BLOOD PRESSURE: 68 MMHG | TEMPERATURE: 96.9 F | HEIGHT: 66 IN | HEART RATE: 89 BPM

## 2022-06-30 DIAGNOSIS — F33.1 MODERATE EPISODE OF RECURRENT MAJOR DEPRESSIVE DISORDER: Primary | ICD-10-CM

## 2022-06-30 PROCEDURE — 99213 OFFICE O/P EST LOW 20 MIN: CPT | Performed by: FAMILY MEDICINE

## 2022-06-30 RX ORDER — VILAZODONE HYDROCHLORIDE 20 MG/1
20 TABLET ORAL DAILY
Qty: 90 TABLET | Refills: 1 | Status: SHIPPED | OUTPATIENT
Start: 2022-06-30 | End: 2023-01-04 | Stop reason: SDUPTHER

## 2022-07-06 DIAGNOSIS — S46.911A STRAIN OF RIGHT SHOULDER, INITIAL ENCOUNTER: ICD-10-CM

## 2022-07-07 RX ORDER — CYCLOBENZAPRINE HCL 10 MG
TABLET ORAL
Qty: 90 TABLET | Refills: 0 | Status: SHIPPED | OUTPATIENT
Start: 2022-07-07 | End: 2022-09-09 | Stop reason: SDUPTHER

## 2022-09-09 DIAGNOSIS — J30.1 SEASONAL ALLERGIC RHINITIS DUE TO POLLEN: ICD-10-CM

## 2022-09-09 DIAGNOSIS — S46.911A STRAIN OF RIGHT SHOULDER, INITIAL ENCOUNTER: ICD-10-CM

## 2022-09-09 RX ORDER — CHLORCYCLIZINE HYDROCHLORIDE AND PSEUDOEPHEDRINE HYDROCHLORIDE 25; 60 MG/1; MG/1
1 TABLET ORAL DAILY
Qty: 90 TABLET | Refills: 0 | Status: SHIPPED | OUTPATIENT
Start: 2022-09-09 | End: 2022-12-21 | Stop reason: SDUPTHER

## 2022-09-09 RX ORDER — CYCLOBENZAPRINE HCL 10 MG
10 TABLET ORAL 3 TIMES DAILY PRN
Qty: 90 TABLET | Refills: 0 | Status: SHIPPED | OUTPATIENT
Start: 2022-09-09 | End: 2022-11-22 | Stop reason: SDUPTHER

## 2022-11-22 DIAGNOSIS — S46.911A STRAIN OF RIGHT SHOULDER, INITIAL ENCOUNTER: ICD-10-CM

## 2022-11-22 RX ORDER — CYCLOBENZAPRINE HCL 10 MG
10 TABLET ORAL 3 TIMES DAILY PRN
Qty: 90 TABLET | Refills: 0 | Status: SHIPPED | OUTPATIENT
Start: 2022-11-22 | End: 2023-02-03 | Stop reason: SDUPTHER

## 2022-12-12 ENCOUNTER — OFFICE VISIT (OUTPATIENT)
Dept: FAMILY MEDICINE CLINIC | Facility: CLINIC | Age: 42
End: 2022-12-12

## 2022-12-12 VITALS
OXYGEN SATURATION: 99 % | BODY MASS INDEX: 26.39 KG/M2 | HEIGHT: 66 IN | WEIGHT: 164.2 LBS | SYSTOLIC BLOOD PRESSURE: 116 MMHG | DIASTOLIC BLOOD PRESSURE: 70 MMHG | TEMPERATURE: 99.3 F | HEART RATE: 72 BPM

## 2022-12-12 DIAGNOSIS — J10.1 INFLUENZA A: ICD-10-CM

## 2022-12-12 LAB
EXPIRATION DATE: ABNORMAL
FLUAV AG UPPER RESP QL IA.RAPID: DETECTED
FLUBV AG UPPER RESP QL IA.RAPID: NOT DETECTED
INTERNAL CONTROL: ABNORMAL
Lab: ABNORMAL
SARS-COV-2 AG UPPER RESP QL IA.RAPID: NOT DETECTED

## 2022-12-12 PROCEDURE — 87428 SARSCOV & INF VIR A&B AG IA: CPT | Performed by: FAMILY MEDICINE

## 2022-12-12 PROCEDURE — 99214 OFFICE O/P EST MOD 30 MIN: CPT | Performed by: FAMILY MEDICINE

## 2022-12-12 RX ORDER — OSELTAMIVIR PHOSPHATE 75 MG/1
75 CAPSULE ORAL 2 TIMES DAILY
Qty: 10 CAPSULE | Refills: 0 | Status: SHIPPED | OUTPATIENT
Start: 2022-12-12 | End: 2022-12-17

## 2022-12-12 RX ORDER — DEXTROMETHORPHAN HYDROBROMIDE AND PROMETHAZINE HYDROCHLORIDE 15; 6.25 MG/5ML; MG/5ML
5 SYRUP ORAL 4 TIMES DAILY PRN
Qty: 180 ML | Refills: 0 | Status: SHIPPED | OUTPATIENT
Start: 2022-12-12

## 2022-12-12 RX ORDER — IPRATROPIUM BROMIDE 42 UG/1
2 SPRAY, METERED NASAL 4 TIMES DAILY
Qty: 15 ML | Refills: 0 | Status: SHIPPED | OUTPATIENT
Start: 2022-12-12

## 2022-12-12 NOTE — PROGRESS NOTES
"Chief Complaint  Cough, Fever, and Nasal Congestion    Subjective        Marleny Palma presents to [facility]  History of Present Illness     The patient presents today for evaluation of fever, cough, and congestion that began on 12/11/2022. She also reports a headache, body aches and chills. She states she was unable to sleep on last night. Her temperature was 99.3 degrees Fahrenheit. She took Tylenol at 9:00 AM this morning. The patient was able to work on yesterday, 12/11/2022. She denies leaving work early on yesterday. She states her son was diagnosed with influenza on 12/07/2022. The patient states she has been trying to stay away from her son. She states that her middle son had influenza A on Thanksgiving. The patient states she has been coughing up green phlegm. She states she has had some drainage coming out of her head for the past week. She states that she has Flonase nasal spray at home.    Objective   Vital Signs:  /70   Pulse 72   Temp 99.3 °F (37.4 °C)   Ht 167.6 cm (66\")   Wt 74.5 kg (164 lb 3.2 oz)   SpO2 99%   BMI 26.50 kg/m²   Estimated body mass index is 26.5 kg/m² as calculated from the following:    Height as of this encounter: 167.6 cm (66\").    Weight as of this encounter: 74.5 kg (164 lb 3.2 oz).          Physical Exam  Constitutional:       General: She is not in acute distress.     Appearance: Normal appearance. She is well-developed.   HENT:      Head: Normocephalic and atraumatic.      Right Ear: Tympanic membrane, ear canal and external ear normal.      Left Ear: Tympanic membrane, ear canal and external ear normal.      Nose: Rhinorrhea present.      Mouth/Throat:      Mouth: Mucous membranes are moist.      Pharynx: Oropharynx is clear. Posterior oropharyngeal erythema present.   Eyes:      Conjunctiva/sclera: Conjunctivae normal.      Pupils: Pupils are equal, round, and reactive to light.   Neck:      Thyroid: No thyromegaly. "   Cardiovascular:      Rate and Rhythm: Regular rhythm. Tachycardia present.      Heart sounds: No murmur heard.  Pulmonary:      Effort: Pulmonary effort is normal.      Breath sounds: Normal breath sounds. No wheezing.   Abdominal:      General: Bowel sounds are normal.      Palpations: Abdomen is soft.      Tenderness: There is no abdominal tenderness.   Musculoskeletal:         General: Normal range of motion.      Cervical back: Neck supple.   Lymphadenopathy:      Cervical: Cervical adenopathy present.   Skin:     General: Skin is warm and dry.   Neurological:      Mental Status: She is alert and oriented to person, place, and time.   Psychiatric:         Mood and Affect: Mood normal.         Behavior: Behavior normal.        Result Review :               Moderate erythema of posterior pharynx.  Lungs are clear.  Heart is mildly tachycardic.  Assessment and Plan   Diagnoses and all orders for this visit:    1. Influenza A  -     POCT SARS-CoV-2 Antigen ABI + Flu  -     ipratropium (ATROVENT) 0.06 % nasal spray; 2 sprays into the nostril(s) as directed by provider 4 (Four) Times a Day.  Dispense: 15 mL; Refill: 0  -     promethazine-dextromethorphan (PROMETHAZINE-DM) 6.25-15 MG/5ML syrup; Take 5 mL by mouth 4 (Four) Times a Day As Needed for Cough.  Dispense: 180 mL; Refill: 0  -     oseltamivir (Tamiflu) 75 MG capsule; Take 1 capsule by mouth 2 (Two) Times a Day for 5 days.  Dispense: 10 capsule; Refill: 0    1. Influenza A.  - The patient was advised that no Tylenol or ibuprofen with no fever for 24 hours, she could return back to work.   - We will prescribe Atrovent and she will use that along with her Flonase nasal spray.          Transcribed from ambient dictation for Meredith Lea Kehrer, MD by Minnie Valdivia.  12/12/22   16:48 EST    Patient or patient representative verbalized consent to the visit recording.  I have personally performed the services described in this document as transcribed by the above  individual, and it is both accurate and complete.    Follow Up   No follow-ups on file.  Patient was given instructions and counseling regarding her condition or for health maintenance advice. Please see specific information pulled into the AVS if appropriate.

## 2022-12-19 ENCOUNTER — TELEPHONE (OUTPATIENT)
Dept: FAMILY MEDICINE CLINIC | Facility: CLINIC | Age: 42
End: 2022-12-19

## 2022-12-19 NOTE — TELEPHONE ENCOUNTER
HUB TO READ     TRIED CALLING / MAILBOX FULL    5 DAYS QUARANTINE. IF AFTER 5 DAYS AND SYMPTOMS ITS AN ADDITIONAL 5 DAYS.    IF PATIENT RETURNS TO WORK AFTER 5 DAYS NO SYMPTOMS. SHE WILL NEED TO WEAR A MASK FOR 5 DAYS

## 2022-12-19 NOTE — TELEPHONE ENCOUNTER
PATIENT CALLED AND NEEDS TO KNOW HOW LONG SHE NEEDS TO QUARANTINE SINCE SHE HAS BEEN AROUND HER SON WHO IS COVID POSITIVE. SHE IS POSITIVE WITH THE FLU.    PLEASE CALL [phone]

## 2022-12-21 DIAGNOSIS — J30.1 SEASONAL ALLERGIC RHINITIS DUE TO POLLEN: ICD-10-CM

## 2022-12-21 RX ORDER — CHLORCYCLIZINE HYDROCHLORIDE AND PSEUDOEPHEDRINE HYDROCHLORIDE 25; 60 MG/1; MG/1
1 TABLET ORAL DAILY
Qty: 90 TABLET | Refills: 0 | Status: SHIPPED | OUTPATIENT
Start: 2022-12-21

## 2023-01-04 DIAGNOSIS — F33.1 MODERATE EPISODE OF RECURRENT MAJOR DEPRESSIVE DISORDER: ICD-10-CM

## 2023-01-04 RX ORDER — VILAZODONE HYDROCHLORIDE 20 MG/1
20 TABLET ORAL DAILY
Qty: 90 TABLET | Refills: 1 | Status: SHIPPED | OUTPATIENT
Start: 2023-01-04

## 2023-02-03 DIAGNOSIS — S46.911A STRAIN OF RIGHT SHOULDER, INITIAL ENCOUNTER: ICD-10-CM

## 2023-02-03 RX ORDER — CYCLOBENZAPRINE HCL 10 MG
10 TABLET ORAL 3 TIMES DAILY PRN
Qty: 90 TABLET | Refills: 0 | Status: SHIPPED | OUTPATIENT
Start: 2023-02-03 | End: 2023-03-30 | Stop reason: SDUPTHER

## 2023-03-30 DIAGNOSIS — S46.911A STRAIN OF RIGHT SHOULDER, INITIAL ENCOUNTER: ICD-10-CM

## 2023-03-31 RX ORDER — CYCLOBENZAPRINE HCL 10 MG
10 TABLET ORAL 3 TIMES DAILY PRN
Qty: 90 TABLET | Refills: 0 | Status: SHIPPED | OUTPATIENT
Start: 2023-03-31

## 2023-05-17 DIAGNOSIS — J30.1 SEASONAL ALLERGIC RHINITIS DUE TO POLLEN: ICD-10-CM

## 2023-05-17 RX ORDER — CHLORCYCLIZINE HYDROCHLORIDE AND PSEUDOEPHEDRINE HYDROCHLORIDE 25; 60 MG/1; MG/1
1 TABLET ORAL DAILY
Qty: 90 TABLET | Refills: 0 | Status: SHIPPED | OUTPATIENT
Start: 2023-05-17

## 2023-06-05 DIAGNOSIS — S46.911A STRAIN OF RIGHT SHOULDER, INITIAL ENCOUNTER: ICD-10-CM

## 2023-06-05 RX ORDER — CYCLOBENZAPRINE HCL 10 MG
10 TABLET ORAL 3 TIMES DAILY PRN
Qty: 90 TABLET | Refills: 0 | Status: SHIPPED | OUTPATIENT
Start: 2023-06-05

## 2023-08-16 DIAGNOSIS — J30.1 SEASONAL ALLERGIC RHINITIS DUE TO POLLEN: ICD-10-CM

## 2023-08-16 DIAGNOSIS — S46.911A STRAIN OF RIGHT SHOULDER, INITIAL ENCOUNTER: ICD-10-CM

## 2023-08-16 RX ORDER — CHLORCYCLIZINE HYDROCHLORIDE AND PSEUDOEPHEDRINE HYDROCHLORIDE 25; 60 MG/1; MG/1
1 TABLET ORAL DAILY
Qty: 90 TABLET | Refills: 0 | Status: SHIPPED | OUTPATIENT
Start: 2023-08-16

## 2023-08-16 RX ORDER — CYCLOBENZAPRINE HCL 10 MG
10 TABLET ORAL 3 TIMES DAILY PRN
Qty: 90 TABLET | Refills: 0 | Status: SHIPPED | OUTPATIENT
Start: 2023-08-16

## 2023-10-01 DIAGNOSIS — F33.1 MODERATE EPISODE OF RECURRENT MAJOR DEPRESSIVE DISORDER: ICD-10-CM

## 2023-10-02 RX ORDER — VILAZODONE HYDROCHLORIDE 20 MG/1
20 TABLET ORAL DAILY
Qty: 90 TABLET | Refills: 0 | Status: SHIPPED | OUTPATIENT
Start: 2023-10-02

## 2023-11-03 DIAGNOSIS — S46.911A STRAIN OF RIGHT SHOULDER, INITIAL ENCOUNTER: ICD-10-CM

## 2023-11-03 DIAGNOSIS — J30.1 SEASONAL ALLERGIC RHINITIS DUE TO POLLEN: ICD-10-CM

## 2023-11-06 RX ORDER — CYCLOBENZAPRINE HCL 10 MG
10 TABLET ORAL 3 TIMES DAILY PRN
Qty: 90 TABLET | Refills: 0 | Status: SHIPPED | OUTPATIENT
Start: 2023-11-06

## 2023-11-06 RX ORDER — CHLORCYCLIZINE HYDROCHLORIDE AND PSEUDOEPHEDRINE HYDROCHLORIDE 25; 60 MG/1; MG/1
1 TABLET ORAL DAILY
Qty: 90 TABLET | Refills: 0 | Status: SHIPPED | OUTPATIENT
Start: 2023-11-06

## 2023-11-20 ENCOUNTER — TELEPHONE (OUTPATIENT)
Dept: FAMILY MEDICINE CLINIC | Facility: CLINIC | Age: 43
End: 2023-11-20

## 2023-11-20 ENCOUNTER — OFFICE VISIT (OUTPATIENT)
Dept: FAMILY MEDICINE CLINIC | Facility: CLINIC | Age: 43
End: 2023-11-20
Payer: COMMERCIAL

## 2023-11-20 VITALS
HEIGHT: 66 IN | WEIGHT: 170.6 LBS | BODY MASS INDEX: 27.42 KG/M2 | OXYGEN SATURATION: 99 % | DIASTOLIC BLOOD PRESSURE: 72 MMHG | TEMPERATURE: 98 F | HEART RATE: 62 BPM | SYSTOLIC BLOOD PRESSURE: 118 MMHG

## 2023-11-20 DIAGNOSIS — F33.1 MODERATE EPISODE OF RECURRENT MAJOR DEPRESSIVE DISORDER: ICD-10-CM

## 2023-11-20 DIAGNOSIS — N30.00 ACUTE CYSTITIS WITHOUT HEMATURIA: Primary | ICD-10-CM

## 2023-11-20 LAB
BILIRUB BLD-MCNC: ABNORMAL MG/DL
CLARITY, POC: CLEAR
COLOR UR: YELLOW
EXPIRATION DATE: ABNORMAL
GLUCOSE UR STRIP-MCNC: NEGATIVE MG/DL
KETONES UR QL: NEGATIVE
LEUKOCYTE EST, POC: ABNORMAL
Lab: ABNORMAL
NITRITE UR-MCNC: POSITIVE MG/ML
PH UR: 6 [PH] (ref 5–8)
PROT UR STRIP-MCNC: ABNORMAL MG/DL
RBC # UR STRIP: NEGATIVE /UL
SP GR UR: 1.02 (ref 1–1.03)
UROBILINOGEN UR QL: NORMAL

## 2023-11-20 PROCEDURE — 99213 OFFICE O/P EST LOW 20 MIN: CPT | Performed by: FAMILY MEDICINE

## 2023-11-20 PROCEDURE — 81003 URINALYSIS AUTO W/O SCOPE: CPT | Performed by: FAMILY MEDICINE

## 2023-11-20 RX ORDER — VILAZODONE HYDROCHLORIDE 40 MG/1
40 TABLET ORAL DAILY
Qty: 90 TABLET | Refills: 1 | Status: SHIPPED | OUTPATIENT
Start: 2023-11-20

## 2023-11-20 RX ORDER — PLECANATIDE 3 MG/1
1 TABLET ORAL DAILY
COMMUNITY
Start: 2023-11-03

## 2023-11-20 RX ORDER — NITROFURANTOIN 25; 75 MG/1; MG/1
100 CAPSULE ORAL 2 TIMES DAILY
Qty: 10 CAPSULE | Refills: 0 | Status: SHIPPED | OUTPATIENT
Start: 2023-11-20 | End: 2023-11-25

## 2023-11-20 NOTE — TELEPHONE ENCOUNTER
The request VIIBRYD 40 MG has been approved. The authorization is effective from 11/20/2023 to 11/19/2024, as long as the member is enrolled in their current health plan. The request was approved as submitted. A written notification letter will follow with additional details.

## 2023-11-20 NOTE — PROGRESS NOTES
"Chief Complaint  Med Refill, Depression, and urine odor     Subjective        Marleny Palma presents to [facility]  History of Present Illness  Patient presents to follow-up on her depression.  She has been feeling more down and so she increased her Viibryd to 2 tablets because she saw you could take up to 40 mg.  It has helped and she has not had any increase side effects.  She denies any SI, HI or hopelessness.  She is also had some odor with urination for the past few days.  She denies any fever chills nausea vomiting.  She is overdue for her physical.      Objective   Vital Signs:  /72   Pulse 62   Temp 98 °F (36.7 °C)   Ht 167.6 cm (66\")   Wt 77.4 kg (170 lb 9.6 oz)   SpO2 99%   BMI 27.54 kg/m²   Estimated body mass index is 27.54 kg/m² as calculated from the following:    Height as of this encounter: 167.6 cm (66\").    Weight as of this encounter: 77.4 kg (170 lb 9.6 oz).             Physical Exam  Constitutional:       General: She is not in acute distress.     Appearance: Normal appearance. She is well-developed.   HENT:      Head: Normocephalic and atraumatic.      Right Ear: External ear normal.      Left Ear: External ear normal.   Eyes:      Conjunctiva/sclera: Conjunctivae normal.      Pupils: Pupils are equal, round, and reactive to light.   Neck:      Thyroid: No thyromegaly.   Pulmonary:      Effort: Pulmonary effort is normal.   Abdominal:      Palpations: Abdomen is soft.      Tenderness: There is no abdominal tenderness. There is no right CVA tenderness or left CVA tenderness.   Neurological:      Mental Status: She is alert and oriented to person, place, and time.   Psychiatric:         Mood and Affect: Mood normal.         Behavior: Behavior normal.        Result Review :          Brief Urine Lab Results  (Last result in the past 365 days)        Color   Clarity   Blood   Leuk Est   Nitrite   Protein   CREAT   Urine HCG        11/20/23 1355 Yellow   " Clear   Negative   Small (1+)   Positive   1+                          Assessment and Plan   Diagnoses and all orders for this visit:    1. Acute cystitis without hematuria (Primary)  -     POCT urinalysis dipstick, automated  -     nitrofurantoin, macrocrystal-monohydrate, (Macrobid) 100 MG capsule; Take 1 capsule by mouth 2 (Two) Times a Day for 5 days.  Dispense: 10 capsule; Refill: 0    2. Moderate episode of recurrent major depressive disorder  -     vilazodone (Viibryd) 40 MG tablet tablet; Take 1 tablet by mouth Daily.  Dispense: 90 tablet; Refill: 1    Acute cystitis-treat with nitrofurantoin and follow-up if no improvement  Depression-we will go ahead and increase the Viibryd since its been helping her, follow-up in 3 months for physical, call if mood worsens         Follow Up   Return in about 3 months (around 2/20/2024) for Annual physical, Recheck.  Patient was given instructions and counseling regarding her condition or for health maintenance advice. Please see specific information pulled into the AVS if appropriate.

## 2024-01-18 DIAGNOSIS — S46.911A STRAIN OF RIGHT SHOULDER, INITIAL ENCOUNTER: ICD-10-CM

## 2024-01-18 DIAGNOSIS — J30.1 SEASONAL ALLERGIC RHINITIS DUE TO POLLEN: ICD-10-CM

## 2024-01-18 RX ORDER — CHLORCYCLIZINE HYDROCHLORIDE AND PSEUDOEPHEDRINE HYDROCHLORIDE 25; 60 MG/1; MG/1
1 TABLET ORAL DAILY
Qty: 90 TABLET | Refills: 0 | Status: SHIPPED | OUTPATIENT
Start: 2024-01-18

## 2024-01-18 RX ORDER — CYCLOBENZAPRINE HCL 10 MG
10 TABLET ORAL 3 TIMES DAILY PRN
Qty: 90 TABLET | Refills: 0 | Status: SHIPPED | OUTPATIENT
Start: 2024-01-18

## 2024-02-01 ENCOUNTER — OFFICE VISIT (OUTPATIENT)
Dept: FAMILY MEDICINE CLINIC | Facility: CLINIC | Age: 44
End: 2024-02-01
Payer: COMMERCIAL

## 2024-02-01 VITALS
HEIGHT: 66 IN | TEMPERATURE: 96.6 F | BODY MASS INDEX: 27.03 KG/M2 | SYSTOLIC BLOOD PRESSURE: 122 MMHG | HEART RATE: 107 BPM | OXYGEN SATURATION: 99 % | DIASTOLIC BLOOD PRESSURE: 82 MMHG | WEIGHT: 168.2 LBS

## 2024-02-01 DIAGNOSIS — L82.1 SEBORRHEIC KERATOSIS: ICD-10-CM

## 2024-02-01 DIAGNOSIS — M25.511 RIGHT SHOULDER PAIN, UNSPECIFIED CHRONICITY: ICD-10-CM

## 2024-02-01 DIAGNOSIS — M77.11 RIGHT LATERAL EPICONDYLITIS: ICD-10-CM

## 2024-02-01 DIAGNOSIS — M54.2 NECK PAIN ON RIGHT SIDE: Primary | ICD-10-CM

## 2024-02-01 PROCEDURE — 99213 OFFICE O/P EST LOW 20 MIN: CPT | Performed by: FAMILY MEDICINE

## 2024-02-01 RX ORDER — MELOXICAM 15 MG/1
15 TABLET ORAL DAILY
Qty: 30 TABLET | Refills: 0 | Status: SHIPPED | OUTPATIENT
Start: 2024-02-01 | End: 2024-03-02

## 2024-02-01 RX ORDER — BACLOFEN 20 MG/1
20 TABLET ORAL 3 TIMES DAILY PRN
Qty: 30 TABLET | Refills: 1 | Status: SHIPPED | OUTPATIENT
Start: 2024-02-01

## 2024-02-01 RX ORDER — HYDROCODONE BITARTRATE AND ACETAMINOPHEN 5; 325 MG/1; MG/1
1 TABLET ORAL EVERY 8 HOURS PRN
Qty: 12 TABLET | Refills: 0 | Status: SHIPPED | OUTPATIENT
Start: 2024-02-01

## 2024-02-01 NOTE — PROGRESS NOTES
"Chief Complaint  Shoulder Pain (Right arm and shoulder pain for about one month )    Subjective        Marleny Palma presents to [facility]  History of Present Illness  Has chronic pain in right shoulder over the past year.  Started getting pain in elbow that would radiate down arm.  Tried using a tennis elbow brace that helped.  Now pain is stabbing in shoulder blade down arm.  No numbness or tingling.  Has dropped stuff.  Does use that arm all day.   No h/o injuries.  Has tied ice, heat, tylenol, ibuprofen.  Has tried rest.   It is interfering with her sleep.     Has a spot on her right upper back she is concerned about because her grandfather has a history of skin cancer.      Objective   Vital Signs:  /82 (BP Location: Right arm, Patient Position: Sitting)   Pulse 107   Temp 96.6 °F (35.9 °C) (Temporal)   Ht 167.6 cm (65.98\")   Wt 76.3 kg (168 lb 3.2 oz)   SpO2 99%   BMI 27.16 kg/m²   Estimated body mass index is 27.16 kg/m² as calculated from the following:    Height as of this encounter: 167.6 cm (65.98\").    Weight as of this encounter: 76.3 kg (168 lb 3.2 oz).             Physical Exam  Constitutional:       General: She is not in acute distress.     Appearance: Normal appearance. She is well-developed.   HENT:      Head: Normocephalic and atraumatic.      Right Ear: External ear normal.      Left Ear: External ear normal.   Eyes:      Conjunctiva/sclera: Conjunctivae normal.      Pupils: Pupils are equal, round, and reactive to light.   Neck:      Thyroid: No thyromegaly.   Pulmonary:      Effort: Pulmonary effort is normal.   Musculoskeletal:         General: Tenderness present. No swelling or deformity. Normal range of motion.      Comments: Pain with range of motion of the neck to the right, tender in lower paracervical spinous muscles    Right shoulder tender anteriorly full range of motion, and negative empty can test, good strength    Right elbow tender over " lateral epicondyle, good strength, no swelling   Skin:     Findings: Lesion present.      Comments: Right upper back with 5 mm brown waxy plaque with irregular borders   Neurological:      Mental Status: She is alert and oriented to person, place, and time.   Psychiatric:         Mood and Affect: Mood normal.         Behavior: Behavior normal.        Result Review :                     Assessment and Plan     Diagnoses and all orders for this visit:    1. Neck pain on right side (Primary)  -     meloxicam (MOBIC) 15 MG tablet; Take 1 tablet by mouth Daily for 30 days.  Dispense: 30 tablet; Refill: 0  -     baclofen (LIORESAL) 20 MG tablet; Take 1 tablet by mouth 3 (Three) Times a Day As Needed for Muscle Spasms.  Dispense: 30 tablet; Refill: 1  -     HYDROcodone-acetaminophen (NORCO) 5-325 MG per tablet; Take 1 tablet by mouth Every 8 (Eight) Hours As Needed for Severe Pain.  Dispense: 12 tablet; Refill: 0  -     Ambulatory Referral to Orthopedic Surgery    2. Right shoulder pain, unspecified chronicity  -     meloxicam (MOBIC) 15 MG tablet; Take 1 tablet by mouth Daily for 30 days.  Dispense: 30 tablet; Refill: 0  -     baclofen (LIORESAL) 20 MG tablet; Take 1 tablet by mouth 3 (Three) Times a Day As Needed for Muscle Spasms.  Dispense: 30 tablet; Refill: 1  -     HYDROcodone-acetaminophen (NORCO) 5-325 MG per tablet; Take 1 tablet by mouth Every 8 (Eight) Hours As Needed for Severe Pain.  Dispense: 12 tablet; Refill: 0  -     Ambulatory Referral to Orthopedic Surgery    3. Right lateral epicondylitis  -     meloxicam (MOBIC) 15 MG tablet; Take 1 tablet by mouth Daily for 30 days.  Dispense: 30 tablet; Refill: 0  -     HYDROcodone-acetaminophen (NORCO) 5-325 MG per tablet; Take 1 tablet by mouth Every 8 (Eight) Hours As Needed for Severe Pain.  Dispense: 12 tablet; Refill: 0  -     Ambulatory Referral to Orthopedic Surgery    4. Seborrheic keratosis    Patient with occupation of  with a lot of  right-sided pain issues-since she has so many issues going on, we will get her into see orthopedics, trial of meloxicam and muscle relaxer  Seborrheic keratosis-patient reassured       Follow Up     No follow-ups on file.  Patient was given instructions and counseling regarding her condition or for health maintenance advice. Please see specific information pulled into the AVS if appropriate.

## 2024-02-27 ENCOUNTER — OFFICE VISIT (OUTPATIENT)
Dept: FAMILY MEDICINE CLINIC | Facility: CLINIC | Age: 44
End: 2024-02-27
Payer: COMMERCIAL

## 2024-02-27 VITALS
SYSTOLIC BLOOD PRESSURE: 126 MMHG | HEIGHT: 66 IN | WEIGHT: 165.2 LBS | HEART RATE: 108 BPM | TEMPERATURE: 98.7 F | OXYGEN SATURATION: 99 % | DIASTOLIC BLOOD PRESSURE: 78 MMHG | BODY MASS INDEX: 26.55 KG/M2

## 2024-02-27 DIAGNOSIS — J10.1 INFLUENZA A: Primary | ICD-10-CM

## 2024-02-27 LAB
EXPIRATION DATE: NORMAL
EXPIRATION DATE: NORMAL
FLUAV AG NPH QL: NEGATIVE
FLUBV AG NPH QL: NEGATIVE
INTERNAL CONTROL: NORMAL
INTERNAL CONTROL: NORMAL
Lab: NORMAL
Lab: NORMAL
SARS-COV-2 AG UPPER RESP QL IA.RAPID: NOT DETECTED

## 2024-02-27 RX ORDER — DEXTROMETHORPHAN HYDROBROMIDE AND PROMETHAZINE HYDROCHLORIDE 15; 6.25 MG/5ML; MG/5ML
5 SYRUP ORAL 4 TIMES DAILY PRN
Qty: 180 ML | Refills: 0 | Status: SHIPPED | OUTPATIENT
Start: 2024-02-27

## 2024-02-27 NOTE — PROGRESS NOTES
"Chief Complaint  Cough, Nasal Congestion, Headache, Fever, Vomiting, Generalized Body Aches, and Chills (Symptoms started yesterday )    Subjective        Marleny Palma presents to [facility]  History of Present Illness  Patient presents with cough, congestion, headache, fever, vomiting and aches with chills since yesterday.  Started waking up with sweats and chills the night before.  Household contact with flu A diagnosed 2 days prior.   Only got up to 100 yesterday.  Fatigue and aches with headaches are the worst symptom.  Has been alernating between dayquil and tylenol.    Cough  Associated symptoms include chills, a fever and headaches.   Headache  Fever   Associated symptoms include coughing, headaches and vomiting.   Vomiting   Associated symptoms include chills, coughing, a fever and headaches.   Chills  Associated symptoms include chills, coughing, a fever, headaches and vomiting.       Objective   Vital Signs:  /78   Pulse 108   Temp 98.7 °F (37.1 °C) (Oral)   Ht 167.6 cm (65.98\")   Wt 74.9 kg (165 lb 3.2 oz)   SpO2 99%   BMI 26.68 kg/m²   Estimated body mass index is 26.68 kg/m² as calculated from the following:    Height as of this encounter: 167.6 cm (65.98\").    Weight as of this encounter: 74.9 kg (165 lb 3.2 oz).             Physical Exam  Constitutional:       General: She is not in acute distress.     Appearance: Normal appearance. She is well-developed.   HENT:      Head: Normocephalic and atraumatic.      Right Ear: Tympanic membrane, ear canal and external ear normal.      Left Ear: Tympanic membrane, ear canal and external ear normal.      Mouth/Throat:      Mouth: Mucous membranes are moist.      Pharynx: Oropharynx is clear.   Eyes:      Conjunctiva/sclera: Conjunctivae normal.      Pupils: Pupils are equal, round, and reactive to light.   Neck:      Thyroid: No thyromegaly.   Cardiovascular:      Rate and Rhythm: Normal rate and regular rhythm.      " Heart sounds: No murmur heard.  Pulmonary:      Effort: Pulmonary effort is normal.      Breath sounds: Normal breath sounds. No wheezing.   Musculoskeletal:         General: Normal range of motion.      Cervical back: Neck supple.   Lymphadenopathy:      Cervical: No cervical adenopathy.   Skin:     General: Skin is warm and dry.   Neurological:      Mental Status: She is alert and oriented to person, place, and time.   Psychiatric:         Mood and Affect: Mood normal.         Behavior: Behavior normal.        Result Review :            Rapid flu a negative  Rapid flu B-  Rapid COVID-negative           Assessment and Plan     Diagnoses and all orders for this visit:    1. Influenza A (Primary)  -     POCT SARS-CoV-2 Antigen ABI  -     POC Influenza A / B  -     promethazine-dextromethorphan (PROMETHAZINE-DM) 6.25-15 MG/5ML syrup; Take 5 mL by mouth 4 (Four) Times a Day As Needed for Cough.  Dispense: 180 mL; Refill: 0    Acute URI-influenza by symptoms and risk of exposure-push fluids and rest, ibuprofen for headache and aches, symptomatic treatment sent in, back to work in a few days if afebrile         Follow Up     No follow-ups on file.  Patient was given instructions and counseling regarding her condition or for health maintenance advice. Please see specific information pulled into the AVS if appropriate.

## 2024-02-27 NOTE — LETTER
February 27, 2024     Patient: Marleny Palma   YOB: 1980   Date of Visit: 2/27/2024       To Whom It May Concern:    It is my medical opinion that Marleny Palma may return to work in three days.            Sincerely,        Meredith Lea Kehrer, MD    CC: No Recipients

## 2024-02-29 ENCOUNTER — PATIENT MESSAGE (OUTPATIENT)
Dept: FAMILY MEDICINE CLINIC | Facility: CLINIC | Age: 44
End: 2024-02-29
Payer: COMMERCIAL

## 2024-03-04 ENCOUNTER — PATIENT MESSAGE (OUTPATIENT)
Dept: FAMILY MEDICINE CLINIC | Facility: CLINIC | Age: 44
End: 2024-03-04
Payer: COMMERCIAL

## 2024-03-04 DIAGNOSIS — M77.11 RIGHT LATERAL EPICONDYLITIS: ICD-10-CM

## 2024-03-04 DIAGNOSIS — M25.511 RIGHT SHOULDER PAIN, UNSPECIFIED CHRONICITY: ICD-10-CM

## 2024-03-04 DIAGNOSIS — M54.2 NECK PAIN ON RIGHT SIDE: ICD-10-CM

## 2024-03-04 DIAGNOSIS — M25.511 RIGHT SHOULDER PAIN, UNSPECIFIED CHRONICITY: Primary | ICD-10-CM

## 2024-03-04 RX ORDER — MELOXICAM 15 MG/1
15 TABLET ORAL DAILY
Qty: 30 TABLET | Refills: 0 | OUTPATIENT
Start: 2024-03-04 | End: 2024-04-03

## 2024-03-04 RX ORDER — MELOXICAM 15 MG/1
15 TABLET ORAL DAILY
Qty: 30 TABLET | Refills: 0 | Status: SHIPPED | OUTPATIENT
Start: 2024-03-04

## 2024-03-04 RX ORDER — HYDROCODONE BITARTRATE AND ACETAMINOPHEN 5; 325 MG/1; MG/1
1 TABLET ORAL EVERY 8 HOURS PRN
Qty: 12 TABLET | Refills: 0 | OUTPATIENT
Start: 2024-03-04

## 2024-03-04 NOTE — TELEPHONE ENCOUNTER
From: Marleny Palma  To: Meredith Kehrer  Sent: 3/4/2024 11:05 AM EST  Subject: Med refill    I am requesting refill on meloxicam and hydrocodone. I am out of melixicam and I am miserable. Appt with ortho 3/19. Thank you.

## 2024-03-19 ENCOUNTER — OFFICE VISIT (OUTPATIENT)
Dept: ORTHOPEDIC SURGERY | Facility: CLINIC | Age: 44
End: 2024-03-19
Payer: COMMERCIAL

## 2024-03-19 VITALS
SYSTOLIC BLOOD PRESSURE: 137 MMHG | HEART RATE: 101 BPM | DIASTOLIC BLOOD PRESSURE: 94 MMHG | WEIGHT: 165 LBS | BODY MASS INDEX: 26.52 KG/M2 | HEIGHT: 66 IN

## 2024-03-19 DIAGNOSIS — M25.521 RIGHT ELBOW PAIN: ICD-10-CM

## 2024-03-19 DIAGNOSIS — M25.511 RIGHT SHOULDER PAIN, UNSPECIFIED CHRONICITY: ICD-10-CM

## 2024-03-19 DIAGNOSIS — M75.51 SUBACROMIAL BURSITIS OF RIGHT SHOULDER JOINT: ICD-10-CM

## 2024-03-19 DIAGNOSIS — M75.21 BICEPS TENDINITIS OF RIGHT UPPER EXTREMITY: Primary | ICD-10-CM

## 2024-03-19 PROCEDURE — 99203 OFFICE O/P NEW LOW 30 MIN: CPT | Performed by: NURSE PRACTITIONER

## 2024-03-19 RX ORDER — HYDROCODONE BITARTRATE AND ACETAMINOPHEN 5; 325 MG/1; MG/1
1 TABLET ORAL EVERY 4 HOURS PRN
Qty: 20 TABLET | Refills: 0 | Status: SHIPPED | OUTPATIENT
Start: 2024-03-19

## 2024-03-19 RX ORDER — CYCLOBENZAPRINE HCL 10 MG
10 TABLET ORAL 3 TIMES DAILY PRN
COMMUNITY

## 2024-03-19 RX ORDER — PREDNISONE 10 MG/1
TABLET ORAL
Qty: 39 TABLET | Refills: 0 | Status: SHIPPED | OUTPATIENT
Start: 2024-03-19

## 2024-03-19 NOTE — PROGRESS NOTES
Subjective:     Patient ID: Marleny Palma is a 44 y.o. female.    Chief Complaint:  Right upper extremity pain, new patient to examiner  History of Present Illness  Marleny Palma 44-year-old female presents to clinic today for evaluation of her right upper extremity.  She began experiencing discomfort at the elbow in December 2023 along the medial and lateral aspect, initially thought it was some tennis elbow she has tried some soft tissue massage as well as some stretches and then migrated to the shoulder, the scapular spine and scapular region and then into her neck.  She has been treated by primary care who did start her on meloxicam she is also tried baclofen which caused excessive drowsiness the next day and currently taking Flexeril.  She was also started on Norco initially to help decrease her pain and help her sleep at night.  She does work as a .  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  When she has 3 days off in a row or a stent of days off she does note significant symptom improvement.  Denies any prior x-ray, MRI, CT.  Denies any prior surgical intervention to the right shoulder.  Is not experiencing numbness or tingling radiating down the right upper extremity.  Denies any other concerns present.    Social History     Occupational History    Not on file   Tobacco Use    Smoking status: Never     Passive exposure: Never    Smokeless tobacco: Never   Vaping Use    Vaping status: Never Used   Substance and Sexual Activity    Alcohol use: Not Currently     Comment: Rarely.    Drug use: Never    Sexual activity: Yes     Partners: Male     Birth control/protection: Surgical     Comment: .      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    Colitis 10/25/2021    SEEN AT [facility] ER    Cystitis 04/2021    Depression     Diverticulitis 11/01/2021    ADMITTED TO [facility]    Diverticulosis     Dysuria 02/2021    Elevated blood pressure reading without diagnosis of hypertension 06/2019    Environmental allergies     Fatigue 08/2021    Geographic tongue     Hiatal hernia 03/2021    Hidradenitis axillaris     Hyperlipidemia     DIET CONTROL    Irritable bowel syndrome     Levator spasm 10/2015    LLQ pain 08/2014    Mallet deformity of right middle finger 05/08/2016    SEEN AT     Migraines     MRSA infection 2011    BILATERAL AXILLA AND GROIN WAS TREATED BY URGENT CARE IN [facility]    Muscle spasm of back 06/2019    Nasal fracture 2009    Nasal turbinate hypertrophy     Outlet dysfunction constipation     Panic attacks     Perineocele 10/2015    Rectocele 07/2014    Scoliosis     Sigmoid diverticulitis 01/11/2021    SEEN AT [facility] ER    Spinal headache     after epidural     Past Surgical History:   Procedure Laterality Date    ANTERIOR AND POSTERIOR VAGINAL REPAIR W/ SACROSPINOUS LIGAMENT SUSPENSION N/A 08/06/2014    DR. ROSELYN COLVIN AT [place]    AXILLARY HIDRADENITIS EXCISION Bilateral 11/16/2011    DR. MARBIN CARY AT  [place]    COLON RESECTION N/A 3/1/2022    Procedure: MOBILIZATION OF SPLENIC FLEXURE AND SIGMOID COLON RESECTION LAPAROSCOPIC WITH DAVINCI ROBOT;  Surgeon: Alejandro Johnston MD;  Location: [facility];  Service: Robotics - DaVinci;  Laterality: N/A;    ENDOSCOPY AND COLONOSCOPY N/A 03/03/2021    3 CM HIATAL HERNIA, SCATTERED DIVERTICULA IN ENTIRE COLON, SMALL INTERNAL HEMORRHOIDS, DR. YASHIRA SHRESTHA AT [facility]    HYSTERECTOMY N/A 08/06/2014    WITH RECTOCELE REPAIR, BSO, DR. ROSELYN COLVIN AT [place]    INCISION AND DRAINAGE ABSCESS  09/04/2013    axilla    TONSILLECTOMY Bilateral 08/10/2015    DR. LIBRA MANZANO AT  "[facility]    TRANSVAGINAL TAPING SUSPENSION N/A 08/06/2014    NADER, DR. ROSELYN COLVIN AT [place]    VAGINAL DELIVERY N/A 02/03/2003    DR. ASHLEY PRICE AT [facility]    VAGINAL DELIVERY N/A 05/13/2005    DR. AVRIL VOGEL AT [facility]    VAGINAL DELIVERY N/A 09/28/2007    DR. ASHLEY PRICE AT [facility]    WISDOM TOOTH EXTRACTION Bilateral        Family History   Problem Relation Age of Onset    Colon polyps Father     Hypertension Father     Arthritis Father     Hyperlipidemia Father     Arthritis Sister     Depression Sister     Hyperlipidemia Sister     Hypertension Sister     Depression Maternal Grandmother     Hypertension Maternal Grandmother     Kidney disease Maternal Grandmother     Heart attack Maternal Grandmother     Hypertension Maternal Grandfather     Cancer Maternal Grandfather     Skin cancer Maternal Grandfather     Prostate cancer Maternal Grandfather     Hypertension Paternal Grandmother     Scoliosis Paternal Grandmother     Thyroid disease Paternal Grandmother     Hypertension Paternal Grandfather     Stroke Paternal Grandfather     Diabetes Paternal Grandfather     Heart attack Paternal Grandfather     Malig Hyperthermia Neg Hx                Objective:  Physical Exam    Vital signs reviewed.   General: No acute distress.  Eyes: conjunctiva clear; pupils equally round and reactive  ENT: external ears and nose atraumatic; oropharynx clear  CV: no peripheral edema  Resp: normal respiratory effort  Skin: no rashes or wounds; normal turgor  Psych: mood and affect appropriate; recent and remote memory intact    Vitals:    03/19/24 1457   BP: 137/94   Pulse: 101   Weight: 74.8 kg (165 lb)   Height: 167.6 cm (66\")         03/19/24  1457   Weight: 74.8 kg (165 lb)     Body mass index is 26.63 kg/m².      Ortho Exam     Right shoulder examined  Active forward flexion 170 degrees  External rotation 60 degrees  Internal rotation L1  Internal and external rotation strength 4+ out of 5  Supraspinatus strength 4+ out of 5  Subscapularis " at belly press exam 4+ out of 5  Bicep strength 4 out of 5  Negative crossarm exam  Negative drop arm exam  Negative bearhug sign  Negative empty can exam  Negative Imperial's  Positive speeds  Negative lift off exam  Positive sensation light touch all distributions of the right upper extremity    Right elbow examined  Elbow extension 0 degrees flexion 130 degrees  Stable to varus and valgus stress  Negative Tinel's at cubital tunnel  Mildly positive tenderness to palpate along the lateral epicondyles  Negative tenderness with supination against resistance with 5 out of 5 strength  Negative tenderness with pronation against resistance with 5 out of 5 strength    Cervical spine examined  Negative Spurling's exam right upper extremity    Imaging:  Right Elbow X-Ray  Indication: Pain  Views: AP and Lateral views    Findings:  No fracture  No bony lesion  Normal soft tissues  Normal joint spaces    No prior studies were available for comparison.    Right Shoulder X-Ray  Indication: Pain  AP Internal and External Rotation views    Findings:  No fracture  No bony lesion  Normal soft tissues  AC joint arthropathy     No prior studies were available for comparison.    Assessment:        1. Biceps tendinitis of right upper extremity    2. Right shoulder pain, unspecified chronicity    3. Right elbow pain    4. Subacromial bursitis of right shoulder joint           Plan:  1.  Discussed plan of care with patient.  We did discuss starting oral steroid holding off on meloxicam since she has multiple points throughout her body that are extremely tender we will start with oral steroid taper.  Did advise may cause some hyperactivity but we will taper the dosage down.  Once the dosage is complete she can resume the meloxicam.  I do recommend continue with the Flexeril we will restart one-time prescription of Norco to see if this offers her greater relief.  We discussed also off work until 3/28/2024 she may return to work on that  date.  We will also proceed with referral to physical therapy to see if we can get her in for some PT before she returns to work.  Will also provide her with some cuff strengthening exercises, biceps, triceps and deltoid strengthening exercises to complete at home as well.  All questions answered today's visit.  Orders:  Orders Placed This Encounter   Procedures    XR Shoulder 2+ View Right    XR Elbow 3+ View Right    Ambulatory Referral to Physical Therapy     New Medications Ordered This Visit   Medications    predniSONE (DELTASONE) 10 MG tablet     Si mg daily x 3 days, 40 mg daily x 3 days, 20 mg daily x 3 days, 10 mg daily x 3 days     Dispense:  39 tablet     Refill:  0    HYDROcodone-acetaminophen (NORCO) 5-325 MG per tablet     Sig: Take 1 tablet by mouth Every 4 (Four) Hours As Needed for Moderate Pain or Severe Pain.     Dispense:  20 tablet     Refill:  0           I ordered and reviewed the COLE today.       Dragon dictation utilized  We encourage all our patients to maintain a healthy weight - a Body Mass Index of 20-25. This, along with regular exercise and a balanced diet can lower your risk of many illnesses such as diabetes, heart disease, and stroke.

## 2024-03-24 ENCOUNTER — PATIENT ROUNDING (BHMG ONLY) (OUTPATIENT)
Dept: ORTHOPEDIC SURGERY | Facility: CLINIC | Age: 44
End: 2024-03-24
Payer: COMMERCIAL

## 2024-03-25 NOTE — PROGRESS NOTES
A My-Chart message has been sent to the patient for PATIENT ROUNDING with [facility].

## 2024-04-12 ENCOUNTER — TELEPHONE (OUTPATIENT)
Dept: ORTHOPEDIC SURGERY | Facility: CLINIC | Age: 44
End: 2024-04-12
Payer: COMMERCIAL

## 2024-04-12 NOTE — TELEPHONE ENCOUNTER
Attempted to call patient about [place] paperwork. I did get it filled out and faxed over for her. There is a $25 charge for having this paperwork filled out that she can pay at her appointment on 4-16-24. I will have the paperwork sent back over to our [place] office for her to  as well for that appointment.

## 2024-04-16 ENCOUNTER — OFFICE VISIT (OUTPATIENT)
Dept: ORTHOPEDIC SURGERY | Facility: CLINIC | Age: 44
End: 2024-04-16
Payer: COMMERCIAL

## 2024-04-16 VITALS — HEIGHT: 66 IN | BODY MASS INDEX: 26.52 KG/M2 | WEIGHT: 165 LBS

## 2024-04-16 DIAGNOSIS — M77.11 RIGHT LATERAL EPICONDYLITIS: ICD-10-CM

## 2024-04-16 DIAGNOSIS — M54.2 NECK PAIN ON RIGHT SIDE: ICD-10-CM

## 2024-04-16 DIAGNOSIS — M77.11 LATERAL EPICONDYLITIS OF RIGHT ELBOW: Primary | ICD-10-CM

## 2024-04-16 DIAGNOSIS — M25.511 RIGHT SHOULDER PAIN, UNSPECIFIED CHRONICITY: ICD-10-CM

## 2024-04-16 PROCEDURE — 99214 OFFICE O/P EST MOD 30 MIN: CPT | Performed by: NURSE PRACTITIONER

## 2024-04-16 RX ORDER — MELOXICAM 15 MG/1
15 TABLET ORAL DAILY
Qty: 30 TABLET | Refills: 6 | Status: SHIPPED | OUTPATIENT
Start: 2024-04-16

## 2024-04-16 RX ORDER — CYCLOBENZAPRINE HCL 10 MG
10 TABLET ORAL 3 TIMES DAILY PRN
Qty: 45 TABLET | Refills: 0 | Status: SHIPPED | OUTPATIENT
Start: 2024-04-16

## 2024-04-16 NOTE — PROGRESS NOTES
Subjective:     Patient ID: Marleny Palma is a 44 y.o. female.    Chief Complaint:  Follow-up right upper extremity  History of Present Illness  Marleny Palma    The patient is a 44-year-old female who returns to clinic today for follow-up of her right shoulder and her elbow.    The patient has completed her oral steroid taper and has continued with a muscle relaxer. She reports an escalation in pain along the medial aspect of the elbow. She has resumed her work, which involves repetitive motion. The pain is present at the lateral aspect of the elbow, exacerbated by extension, flexion, and supination activity. She denies any presence of paresthesia. However, she does localize pain to the lateral epicondyle of the upper extremity. She has continued with some stretching exercises and strengthening of the upper extremity, as instructed by physical therapy. She denies any shoulder pain or any other concerns.     Social History     Occupational History    Not on file   Tobacco Use    Smoking status: Never     Passive exposure: Never    Smokeless tobacco: Never   Vaping Use    Vaping status: Never Used   Substance and Sexual Activity    Alcohol use: Not Currently     Comment: Rarely.    Drug use: Never    Sexual activity: Yes     Partners: Male     Birth control/protection: Surgical     Comment: .      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    Colitis 10/25/2021    SEEN AT [facility] ER    Cystitis 04/2021    Depression     Diverticulitis 11/01/2021    ADMITTED TO [facility]    Diverticulosis     Dysuria 02/2021    Elevated blood pressure reading without diagnosis of hypertension 06/2019    Environmental allergies     Fatigue 08/2021    Geographic tongue     Hiatal hernia 03/2021    Hidradenitis  axillaris     Hyperlipidemia     DIET CONTROL    Irritable bowel syndrome     Levator spasm 10/2015    LLQ pain 08/2014    Mallet deformity of right middle finger 05/08/2016    SEEN AT     Migraines     MRSA infection 2011    BILATERAL AXILLA AND GROIN WAS TREATED BY URGENT CARE IN [facility]    Muscle spasm of back 06/2019    Nasal fracture 2009    Nasal turbinate hypertrophy     Outlet dysfunction constipation     Panic attacks     Perineocele 10/2015    Rectocele 07/2014    Scoliosis     Sigmoid diverticulitis 01/11/2021    SEEN AT [facility] ER    Spinal headache     after epidural     Past Surgical History:   Procedure Laterality Date    ANTERIOR AND POSTERIOR VAGINAL REPAIR W/ SACROSPINOUS LIGAMENT SUSPENSION N/A 08/06/2014    DR. ROSELYN COLVIN AT [place]    AXILLARY HIDRADENITIS EXCISION Bilateral 11/16/2011    DR. MARBIN CARY AT  [place]    COLON RESECTION N/A 3/1/2022    Procedure: MOBILIZATION OF SPLENIC FLEXURE AND SIGMOID COLON RESECTION LAPAROSCOPIC WITH Sol[employer]umINCI ROBOT;  Surgeon: Alejandro Johnston MD;  Location: [facility];  Service: Robotics - DaVinci;  Laterality: N/A;    ENDOSCOPY AND COLONOSCOPY N/A 03/03/2021    3 CM HIATAL HERNIA, SCATTERED DIVERTICULA IN ENTIRE COLON, SMALL INTERNAL HEMORRHOIDS, DR. YASHIRA SHRESTHA AT [facility]    HYSTERECTOMY N/A 08/06/2014    WITH RECTOCELE REPAIR, BSO, DR. ROSELYN COLVIN AT [place]    INCISION AND DRAINAGE ABSCESS  09/04/2013    axilla    TONSILLECTOMY Bilateral 08/10/2015    DR. LIBRA MANZANO AT [facility]    TRANSVAGINAL TAPING SUSPENSION N/A 08/06/2014    SLING, DR. ROSELYN COLVIN AT [place]    VAGINAL DELIVERY N/A 02/03/2003    DR. ASHLEY PRICE AT [facility]    VAGINAL DELIVERY N/A 05/13/2005    DR. AVRIL VOGEL AT [facility]    VAGINAL DELIVERY N/A 09/28/2007    DR. ASHLEY PRICE AT [facility]    WISDOM TOOTH EXTRACTION Bilateral        Family History   Problem Relation Age of Onset    Colon polyps Father     Hypertension Father     Arthritis Father     Hyperlipidemia  "Father     Arthritis Sister     Depression Sister     Hyperlipidemia Sister     Hypertension Sister     Depression Maternal Grandmother     Hypertension Maternal Grandmother     Kidney disease Maternal Grandmother     Heart attack Maternal Grandmother     Hypertension Maternal Grandfather     Cancer Maternal Grandfather     Skin cancer Maternal Grandfather     Prostate cancer Maternal Grandfather     Hypertension Paternal Grandmother     Scoliosis Paternal Grandmother     Thyroid disease Paternal Grandmother     Hypertension Paternal Grandfather     Stroke Paternal Grandfather     Diabetes Paternal Grandfather     Heart attack Paternal Grandfather     Malig Hyperthermia Neg Hx                Objective:  Physical Exam    General: No acute distress.  Eyes: conjunctiva clear; pupils equally round and reactive  ENT: external ears and nose atraumatic; oropharynx clear  CV: no peripheral edema  Resp: normal respiratory effort  Skin: no rashes or wounds; normal turgor  Psych: mood and affect appropriate; recent and remote memory intact    Vitals:    04/16/24 1456   Weight: 74.8 kg (165 lb)   Height: 167.6 cm (66\")         04/16/24  1456   Weight: 74.8 kg (165 lb)     Body mass index is 26.63 kg/m².      Right Elbow Exam     Tenderness   The patient is experiencing tenderness in the lateral epicondyle.     Range of Motion   Extension:  0   Flexion:  120   Pronation:  0   Supination:  120     Muscle Strength   Pronation:  4/5   Supination:  4/5     Other   Erythema: absent  Sensation: normal  Pulse: present                   Assessment:        1. Lateral epicondylitis of right elbow    2. Right shoulder pain, unspecified chronicity    3. Neck pain on right side    4. Right lateral epicondylitis           Plan:    1. Pain in the right shoulder and elbow.  - The patient was engaged in a comprehensive discussion regarding the plan of care. Consequently, an MRI of the right elbow will be conducted to evaluate for potential " tendon injury. The current anti-inflammatory regimen will be maintained, which has proven beneficial in managing her symptoms. The patient was encouraged to persist with the elbow stretches and exercises.     All questions addressed.    Follow-up  The patient is scheduled for a follow-up visit in the clinic subsequent to the completion of the testing to discuss the results and formulate a further plan of care.      Orders:  Orders Placed This Encounter   Procedures    MRI Elbow Right Without Contrast     New Medications Ordered This Visit   Medications    meloxicam (MOBIC) 15 MG tablet     Sig: Take 1 tablet by mouth Daily.     Dispense:  30 tablet     Refill:  6    cyclobenzaprine (FLEXERIL) 10 MG tablet     Sig: Take 1 tablet by mouth 3 (Three) Times a Day As Needed for Muscle Spasms.     Dispense:  45 tablet     Refill:  0       Dragon dictation utilized      Transcribed from ambient dictation for MICKY Salgado by Leeann Loyola.  04/16/24   16:49 EDT    Patient or patient representative verbalized consent to the visit recording.  I have personally performed the services described in this document as transcribed by the above individual, and it is both accurate and complete.

## 2024-04-26 ENCOUNTER — HOSPITAL ENCOUNTER (OUTPATIENT)
Dept: MRI IMAGING | Facility: HOSPITAL | Age: 44
Discharge: HOME | End: 2024-04-26
Admitting: NURSE PRACTITIONER
Payer: COMMERCIAL

## 2024-04-26 DIAGNOSIS — M77.11 LATERAL EPICONDYLITIS OF RIGHT ELBOW: ICD-10-CM

## 2024-04-26 PROCEDURE — 73221 MRI JOINT UPR EXTREM W/O DYE: CPT

## 2024-04-30 ENCOUNTER — OFFICE VISIT (OUTPATIENT)
Dept: ORTHOPEDIC SURGERY | Facility: CLINIC | Age: 44
End: 2024-04-30
Payer: COMMERCIAL

## 2024-04-30 VITALS — BODY MASS INDEX: 26.52 KG/M2 | HEIGHT: 66 IN | WEIGHT: 165 LBS

## 2024-04-30 DIAGNOSIS — M77.11 LATERAL EPICONDYLITIS OF RIGHT ELBOW: Primary | ICD-10-CM

## 2024-04-30 PROCEDURE — 99213 OFFICE O/P EST LOW 20 MIN: CPT | Performed by: NURSE PRACTITIONER

## 2024-04-30 PROCEDURE — 20550 NJX 1 TENDON SHEATH/LIGAMENT: CPT | Performed by: NURSE PRACTITIONER

## 2024-04-30 RX ORDER — TRIAMCINOLONE ACETONIDE 40 MG/ML
40 INJECTION, SUSPENSION INTRA-ARTICULAR; INTRAMUSCULAR
Status: COMPLETED | OUTPATIENT
Start: 2024-04-30 | End: 2024-04-30

## 2024-04-30 RX ORDER — LIDOCAINE HYDROCHLORIDE 10 MG/ML
2 INJECTION, SOLUTION EPIDURAL; INFILTRATION; INTRACAUDAL; PERINEURAL
Status: COMPLETED | OUTPATIENT
Start: 2024-04-30 | End: 2024-04-30

## 2024-04-30 RX ADMIN — LIDOCAINE HYDROCHLORIDE 2 ML: 10 INJECTION, SOLUTION EPIDURAL; INFILTRATION; INTRACAUDAL; PERINEURAL at 16:00

## 2024-04-30 RX ADMIN — TRIAMCINOLONE ACETONIDE 40 MG: 40 INJECTION, SUSPENSION INTRA-ARTICULAR; INTRAMUSCULAR at 16:00

## 2024-04-30 NOTE — PROGRESS NOTES
Subjective:     Patient ID: Marleny Palma is a 44 y.o. female.    Chief Complaint:  Right elbow follow-up   Lateral epicondylitis   History of Present Illness  Marleny Palma  The patient is a 44-year-old female who presents to clinic today for re-evaluation of her right elbow.    She has recently had an MRI and presents to discuss the results and a further plan of care. She has been utilizing a tennis elbow brace for the lateral epicondyle, which has resulted in an improvement in her symptoms. Despite her symptoms, she has continued to work as a , which significantly exacerbates her symptoms. She has not received any corticosteroid injections or any other treatment for her elbow. She reports no other concerns at present.     Social History     Occupational History    Not on file   Tobacco Use    Smoking status: Never     Passive exposure: Never    Smokeless tobacco: Never   Vaping Use    Vaping status: Never Used   Substance and Sexual Activity    Alcohol use: Not Currently     Comment: Rarely.    Drug use: Never    Sexual activity: Yes     Partners: Male     Birth control/protection: Surgical     Comment: .      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    Colitis 10/25/2021    SEEN AT [facility] ER    Cystitis 04/2021    Depression     Diverticulitis 11/01/2021    ADMITTED TO [facility]    Diverticulosis     Dysuria 02/2021    Elevated blood pressure reading without diagnosis of hypertension 06/2019    Environmental allergies     Fatigue 08/2021    Geographic tongue     Hiatal hernia 03/2021    Hidradenitis axillaris     Hyperlipidemia     DIET CONTROL    Irritable bowel syndrome     Levator spasm 10/2015    LLQ pain 08/2014    Mallet deformity of right middle finger 05/08/2016     SEEN AT     Migraines     MRSA infection 2011    BILATERAL AXILLA AND GROIN WAS TREATED BY URGENT CARE IN [facility]    Muscle spasm of back 06/2019    Nasal fracture 2009    Nasal turbinate hypertrophy     Outlet dysfunction constipation     Panic attacks     Perineocele 10/2015    Rectocele 07/2014    Scoliosis     Sigmoid diverticulitis 01/11/2021    SEEN AT [facility] ER    Spinal headache     after epidural     Past Surgical History:   Procedure Laterality Date    ANTERIOR AND POSTERIOR VAGINAL REPAIR W/ SACROSPINOUS LIGAMENT SUSPENSION N/A 08/06/2014    DR. ROSELYN COLVIN AT [place]    AXILLARY HIDRADENITIS EXCISION Bilateral 11/16/2011    DR. MARBIN CARY AT  [place]    COLON RESECTION N/A 3/1/2022    Procedure: MOBILIZATION OF SPLENIC FLEXURE AND SIGMOID COLON RESECTION LAPAROSCOPIC WITH [employer]CI ROBOT;  Surgeon: Alejandro Johnston MD;  Location: [facility];  Service: Robotics - DaVinci;  Laterality: N/A;    ENDOSCOPY AND COLONOSCOPY N/A 03/03/2021    3 CM HIATAL HERNIA, SCATTERED DIVERTICULA IN ENTIRE COLON, SMALL INTERNAL HEMORRHOIDS, DR. YASHIRA SHRESTHA AT [facility]    HYSTERECTOMY N/A 08/06/2014    WITH RECTOCELE REPAIR, BSO, DR. ROSELYN COLVIN AT [place]    INCISION AND DRAINAGE ABSCESS  09/04/2013    axilla    TONSILLECTOMY Bilateral 08/10/2015    DR. LIBRA MANZANO AT [facility]    TRANSVAGINAL TAPING SUSPENSION N/A 08/06/2014    SLING, DR. ROSELYN COLVIN AT [place]    VAGINAL DELIVERY N/A 02/03/2003    DR. ASHLEY PRICE AT [facility]    VAGINAL DELIVERY N/A 05/13/2005    DR. ARVIL VOGEL AT [facility]    VAGINAL DELIVERY N/A 09/28/2007    DR. ASHLEY PRICE AT [facility]    WISDOM TOOTH EXTRACTION Bilateral        Family History   Problem Relation Age of Onset    Colon polyps Father     Hypertension Father     Arthritis Father     Hyperlipidemia Father     Arthritis Sister     Depression Sister     Hyperlipidemia Sister     Hypertension Sister     Depression Maternal Grandmother     Hypertension Maternal Grandmother      "Kidney disease Maternal Grandmother     Heart attack Maternal Grandmother     Hypertension Maternal Grandfather     Cancer Maternal Grandfather     Skin cancer Maternal Grandfather     Prostate cancer Maternal Grandfather     Hypertension Paternal Grandmother     Scoliosis Paternal Grandmother     Thyroid disease Paternal Grandmother     Hypertension Paternal Grandfather     Stroke Paternal Grandfather     Diabetes Paternal Grandfather     Heart attack Paternal Grandfather     Malig Hyperthermia Neg Hx                Objective:  Physical Exam  General: No acute distress.  Eyes: conjunctiva clear; pupils equally round and reactive  ENT: external ears and nose atraumatic; oropharynx clear  CV: no peripheral edema  Resp: normal respiratory effort  Skin: no rashes or wounds; normal turgor  Psych: mood and affect appropriate; recent and remote memory intact    Vitals:    04/30/24 1526   Weight: 74.8 kg (165 lb)   Height: 167.6 cm (66\")         04/30/24  1526   Weight: 74.8 kg (165 lb)     Body mass index is 26.63 kg/m².      Right Elbow Exam     Tenderness   The patient is experiencing tenderness in the lateral epicondyle.     Range of Motion   Extension:  0   Flexion:  130   Pronation:  0   Supination:  130     Muscle Strength   Pronation:  5/5   Supination:  5/5     Tests   Varus: negative  Tinel's sign (cubital tunnel): negative    Other   Erythema: absent  Sensation: normal  Pulse: present                 Imaging:    MRI Elbow Right Without Contrast    Result Date: 4/26/2024  1.Evidence for chronic tendinopathy involving the common extensor tendon origin at the lateral epicondyle. There is superimposed partial-thickness tear. There is no complete disruption or distal retraction. 2.Associated chronic degenerative type changes are seen involving the proximal attachments of the components of the underlying radial collateral ligament complex at the lateral epicondyle. 3.Evidence for mild tendinopathy involving the " proximal attachment of the common flexor tendon origin at the medial epicondyle. There is no tendon tear. 4.Evidence for chronic degenerative type changes involving the proximal attachments of the components of the ulnar collateral ligament at the medial epicondyle. 5.Evidence for mild tendinopathy involving the distal triceps tendon. Electronically Signed: Jorge Dueñas MD  4/26/2024 3:51 PM EDT  Workstation ID: [id number]    Independently reviewed MRI I right elbow chronic tendinopathy common extensor tendon at lateral epicondyle superimposed partial-thickness tear.  No evidence of complete disruption or distal retraction.  Chronic degenerative changes involving proximal attachment of components of underlying radial collateral ligament complex at lateral epicondyle.  Evidence for mild tendinopathy and proximal attachment of the common flexor tendon origin at medial epicondyle.  Chronic degenerative changes proximal attachment ulnar collateral ligament medial epicondyle.  Mild tendinopathy distal triceps tendon.     Assessment:        1. Lateral epicondylitis of right elbow           Plan:  - Medium Joint Arthrocentesis: R elbow (Lateral epiclondyle) on 4/30/2024 4:00 PM  Details: 22 G needle, lateral (Lateral epicondyle) approach  Medications: 2 mL lidocaine PF 1% 1 %; 40 mg triamcinolone acetonide 40 MG/ML  Outcome: tolerated well, no immediate complications  Procedure, treatment alternatives, risks and benefits explained, specific risks discussed. Consent was given by the patient. Immediately prior to procedure a time out was called to verify the correct patient, procedure, equipment, support staff and site/side marked as required. Patient was prepped and draped in the usual sterile fashion.       1.The patient was engaged in a comprehensive discussion regarding the plan of care. The decision was made to proceed with a corticosteroid injection in the right elbow. She was advised to limit her lifting, pulling,  and pushing activities to 20 pounds or less with the right upper extremity for the next 6 weeks. She was also encouraged to continue to use the tennis elbow brace. The application of the brace at the injection site was recommended this evening. All her queries were addressed.    2.Patient would like to proceed with cortisone injection today in the right elbow. Recommended limited use of affected extremity for the next 24 hours to only essential activities other than work on general active and passive motion. Recommended supplementing with ice and soft tissue massage. Discussed with patient that they should see results in 5-7 days if no improvement in 5-6 weeks I have asked them to call the office to review other options. Patient should call the office immediately if they notice redness, warmth, fevers, chills, or residual numbness or tingling for greater than 6 hours after injection.    She is scheduled for a follow-up visit to the clinic in 6 weeks for re-evaluation. She was encouraged to reach out with any questions or concerns in the interim. However, I will see her sooner if necessary.    Orders:  Orders Placed This Encounter   Procedures    - Medium Joint Arthrocentesis: R elbow     No orders of the defined types were placed in this encounter.          Dragon dictation utilized    Transcribed from ambient dictation for MICKY Salgado by Lety Atkinson.  04/30/24   17:11 EDT    Patient or patient representative verbalized consent to the visit recording.  I have personally performed the services described in this document as transcribed by the above individual, and it is both accurate and complete.

## 2024-05-12 DIAGNOSIS — F33.1 MODERATE EPISODE OF RECURRENT MAJOR DEPRESSIVE DISORDER: ICD-10-CM

## 2024-05-13 RX ORDER — VILAZODONE HYDROCHLORIDE 40 MG/1
40 TABLET ORAL DAILY
Qty: 90 TABLET | Refills: 1 | Status: SHIPPED | OUTPATIENT
Start: 2024-05-13

## 2024-05-21 DIAGNOSIS — F33.1 MODERATE EPISODE OF RECURRENT MAJOR DEPRESSIVE DISORDER: ICD-10-CM

## 2024-05-21 RX ORDER — VILAZODONE HYDROCHLORIDE 40 MG/1
40 TABLET ORAL DAILY
Qty: 90 TABLET | Refills: 1 | Status: SHIPPED | OUTPATIENT
Start: 2024-05-21

## 2024-05-23 ENCOUNTER — OFFICE VISIT (OUTPATIENT)
Dept: FAMILY MEDICINE CLINIC | Facility: CLINIC | Age: 44
End: 2024-05-23
Payer: COMMERCIAL

## 2024-05-23 VITALS
TEMPERATURE: 97.7 F | WEIGHT: 166 LBS | OXYGEN SATURATION: 97 % | BODY MASS INDEX: 26.68 KG/M2 | DIASTOLIC BLOOD PRESSURE: 80 MMHG | HEART RATE: 105 BPM | HEIGHT: 66 IN | SYSTOLIC BLOOD PRESSURE: 128 MMHG

## 2024-05-23 DIAGNOSIS — Z79.899 CURRENT USE OF PROTON PUMP INHIBITOR: ICD-10-CM

## 2024-05-23 DIAGNOSIS — J30.1 SEASONAL ALLERGIC RHINITIS DUE TO POLLEN: ICD-10-CM

## 2024-05-23 DIAGNOSIS — Z00.00 GENERAL MEDICAL EXAM: Primary | ICD-10-CM

## 2024-05-23 DIAGNOSIS — E78.49 OTHER HYPERLIPIDEMIA: ICD-10-CM

## 2024-05-23 DIAGNOSIS — M25.511 RIGHT SHOULDER PAIN, UNSPECIFIED CHRONICITY: ICD-10-CM

## 2024-05-23 DIAGNOSIS — F33.1 MODERATE EPISODE OF RECURRENT MAJOR DEPRESSIVE DISORDER: ICD-10-CM

## 2024-05-23 DIAGNOSIS — M77.11 RIGHT LATERAL EPICONDYLITIS: ICD-10-CM

## 2024-05-23 PROCEDURE — 99396 PREV VISIT EST AGE 40-64: CPT | Performed by: FAMILY MEDICINE

## 2024-05-23 RX ORDER — CYCLOBENZAPRINE HCL 10 MG
10 TABLET ORAL 3 TIMES DAILY PRN
Qty: 45 TABLET | Refills: 2 | Status: SHIPPED | OUTPATIENT
Start: 2024-05-23

## 2024-05-23 NOTE — PROGRESS NOTES
Subjective   Marleny Palma is a 44 y.o. female who presents for annual female wellness exam.  Chief Complaint   Patient presents with    Annual Exam        History of Present Illness  The patient is a 44-year-old female here for annual exam and follow up on current medical problems.    The patient is currently on a regimen of Viibryd for depression. She reports a general sense of well-being, with no feelings of sadness or hopelessness. Her sleep pattern is regular.    The patient denies experiencing chest pain, shortness of breath, nausea, or vomiting. She does, however, report experiencing hot flashes predominantly at night.    Supplemental Information  She takes Trulance as needed for chronic constipation. With increased water intake, she has not needed it. She takes medication for her allergies. She takes pantoprazole daily. She takes a muscle relaxer for her shoulder and elbow. She received a steroid injection by Dr. Rosa Maria Robb. She had an MRI that showed a 30 percent tear in her shoulder and elbow. She has never played sports in her life. The steroid injection was helpful until she ran out of her meloxicam and the pain started again. She has a refill on her meloxicam.   She occasionally drinks alcohol.       Menstrual History: hysterectomy  Pregnancy History:   Sexual History: with spouse   Contraception: NA  Hormone Replacement Therapy: NA  Diet: trying to lose weight with nutrition shake  Exercise: not now  Do you feel safe? yes  Have you ever been abused? no  Last dental exam: up to date  Last eye exam: due    Mammogram: has not had  Pap Smear: na  Bone Density: na  Colon Cancer Screening: NA    Immunization History   Administered Date(s) Administered    Flu Vaccine Intradermal Quad 18-64YR 10/11/2016    Fluzone (or Fluarix & Flulaval for VFC) >6mos 2019    Tdap 10/11/2016       The following portions of the patient's history were reviewed and updated as appropriate: allergies, current  medications, past family history, past medical history, past social history, past surgical history and problem list.    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    Colitis 10/25/2021    SEEN AT [facility]    Cystitis 04/2021    Depression     Diverticulitis 11/01/2021    ADMITTED TO [facility]    Diverticulosis     Dysuria 02/2021    Elevated blood pressure reading without diagnosis of hypertension 06/2019    Environmental allergies     Fatigue 08/2021    Geographic tongue     Hiatal hernia 03/2021    Hidradenitis axillaris     Hyperlipidemia     DIET CONTROL    Irritable bowel syndrome     Levator spasm 10/2015    LLQ pain 08/2014    Mallet deformity of right middle finger 05/08/2016    SEEN AT     Migraines     MRSA infection 2011    BILATERAL AXILLA AND GROIN WAS TREATED BY URGENT CARE IN [facility]    Muscle spasm of back 06/2019    Nasal fracture 2009    Nasal turbinate hypertrophy     Outlet dysfunction constipation     Panic attacks     Perineocele 10/2015    Rectocele 07/2014    Scoliosis     Sigmoid diverticulitis 01/11/2021    SEEN AT [facility]    Spinal headache     after epidural       Past Surgical History:   Procedure Laterality Date    ANTERIOR AND POSTERIOR VAGINAL REPAIR W/ SACROSPINOUS LIGAMENT SUSPENSION N/A 08/06/2014    DR. ROSELYN COLVIN AT [place]    AXILLARY HIDRADENITIS EXCISION Bilateral 11/16/2011    DR. MARBIN CARY AT  [place]    COLON RESECTION N/A 3/1/2022    Procedure: MOBILIZATION OF SPLENIC FLEXURE AND SIGMOID COLON RESECTION LAPAROSCOPIC WITH DAVINCI ROBOT;  Surgeon: Alejandro Johnston MD;  Location: [facility];  Service: Robotics - DaVinci;  Laterality: N/A;    ENDOSCOPY AND COLONOSCOPY N/A 03/03/2021    3 CM HIATAL HERNIA, SCATTERED DIVERTICULA  IN ENTIRE COLON, SMALL INTERNAL HEMORRHOIDS, DR. YASHIRA SHRESTHA AT [facility]    HYSTERECTOMY N/A 08/06/2014    WITH RECTOCELE REPAIR, BSO, DR. ROSELYN COLVIN AT [place]    INCISION AND DRAINAGE ABSCESS  09/04/2013    axilla    TONSILLECTOMY Bilateral 08/10/2015    DR. LIBRA MANZANO AT [facility]    TRANSVAGINAL TAPING SUSPENSION N/A 08/06/2014    SLING, DR. ROSELYN COLVIN AT [place]    VAGINAL DELIVERY N/A 02/03/2003    DR. ASHLEY PRICE AT [facility]    VAGINAL DELIVERY N/A 05/13/2005    DR. AVRIL VOGEL AT [facility]    VAGINAL DELIVERY N/A 09/28/2007    DR. ASHLEY PRICE AT [facility]    WISDOM TOOTH EXTRACTION Bilateral        Family History   Problem Relation Age of Onset    Colon polyps Father     Hypertension Father     Arthritis Father     Hyperlipidemia Father     Arthritis Sister     Depression Sister     Hyperlipidemia Sister     Hypertension Sister     Depression Maternal Grandmother     Hypertension Maternal Grandmother     Kidney disease Maternal Grandmother     Heart attack Maternal Grandmother     Hypertension Maternal Grandfather     Cancer Maternal Grandfather     Skin cancer Maternal Grandfather     Prostate cancer Maternal Grandfather     Hypertension Paternal Grandmother     Scoliosis Paternal Grandmother     Thyroid disease Paternal Grandmother     Hypertension Paternal Grandfather     Stroke Paternal Grandfather     Diabetes Paternal Grandfather     Heart attack Paternal Grandfather     Malig Hyperthermia Neg Hx        Social History     Socioeconomic History    Marital status:    Tobacco Use    Smoking status: Never     Passive exposure: Never    Smokeless tobacco: Never   Vaping Use    Vaping status: Never Used   Substance and Sexual Activity    Alcohol use: Not Currently     Comment: Rarely.    Drug use: Never    Sexual activity: Yes     Partners: Male     Birth control/protection: Surgical     Comment: .         Current Outpatient Medications:     acetaminophen (TYLENOL) 325 MG tablet, Take 2 tablets by  "mouth Every 4 (Four) Hours As Needed., Disp: , Rfl:     Chlorcyclizine-Pseudoephed (Stahist AD) 25-60 MG tablet, Take 1 tablet by mouth Daily., Disp: 90 tablet, Rfl: 0    cyclobenzaprine (FLEXERIL) 10 MG tablet, Take 1 tablet by mouth 3 (Three) Times a Day As Needed for Muscle Spasms., Disp: 45 tablet, Rfl: 2    meloxicam (MOBIC) 15 MG tablet, Take 1 tablet by mouth Daily., Disp: 30 tablet, Rfl: 6    pantoprazole (PROTONIX) 40 MG EC tablet, Take 1 tablet by mouth Daily., Disp: , Rfl:     Trulance 3 MG tablet, Take 1 tablet by mouth Daily., Disp: , Rfl:     vilazodone (VIIBRYD) 40 MG tablet tablet, Take 1 tablet by mouth Daily., Disp: 90 tablet, Rfl: 1    Review of Systems    Objective   Vitals:    05/23/24 1431   BP: 128/80   Pulse: 105   Temp: 97.7 °F (36.5 °C)   SpO2: 97%   Weight: 75.3 kg (166 lb)   Height: 167.6 cm (65.98\")     Body mass index is 26.81 kg/m².  Physical Exam  Vitals and nursing note reviewed.   Constitutional:       General: She is not in acute distress.     Appearance: Normal appearance. She is well-developed.   HENT:      Head: Normocephalic and atraumatic.      Right Ear: Tympanic membrane, ear canal and external ear normal.      Left Ear: Tympanic membrane, ear canal and external ear normal.      Nose: Nose normal.      Mouth/Throat:      Mouth: Mucous membranes are moist.      Pharynx: Oropharynx is clear. No oropharyngeal exudate or posterior oropharyngeal erythema.   Eyes:      Conjunctiva/sclera: Conjunctivae normal.      Pupils: Pupils are equal, round, and reactive to light.   Neck:      Thyroid: No thyromegaly.   Cardiovascular:      Rate and Rhythm: Normal rate and regular rhythm.      Heart sounds: No murmur heard.  Pulmonary:      Effort: Pulmonary effort is normal.      Breath sounds: Normal breath sounds. No wheezing.   Abdominal:      General: Abdomen is flat. Bowel sounds are normal. There is no distension.      Palpations: Abdomen is soft. There is no mass.      Tenderness: " There is no abdominal tenderness.      Hernia: No hernia is present.   Musculoskeletal:         General: No swelling. Normal range of motion.      Cervical back: Normal range of motion and neck supple.      Right lower leg: No edema.      Left lower leg: No edema.   Lymphadenopathy:      Cervical: No cervical adenopathy.   Skin:     General: Skin is warm and dry.      Capillary Refill: Capillary refill takes less than 2 seconds.      Findings: No rash.   Neurological:      General: No focal deficit present.      Mental Status: She is alert and oriented to person, place, and time.      Cranial Nerves: No cranial nerve deficit.   Psychiatric:         Mood and Affect: Mood normal.         Behavior: Behavior normal.       Physical Exam  Vital Signs  The patient's BMI today is 26.8.       Results  Imaging  MRI shows a 30% tear in the shoulder and elbow.       Assessment & Plan   Diagnoses and all orders for this visit:    1. General medical exam (Primary)    2. Moderate episode of recurrent major depressive disorder    3. Other hyperlipidemia  -     TSH  -     CBC & Differential  -     Comprehensive Metabolic Panel  -     Lipid Panel    4. Right lateral epicondylitis    5. Right shoulder pain, unspecified chronicity    6. Seasonal allergic rhinitis due to pollen    7. Current use of proton pump inhibitor  -     Vitamin B12    Other orders  -     cyclobenzaprine (FLEXERIL) 10 MG tablet; Take 1 tablet by mouth 3 (Three) Times a Day As Needed for Muscle Spasms.  Dispense: 45 tablet; Refill: 2      Assessment & Plan  1. Annual examination.  A comprehensive panel of laboratory tests will be conducted to assess her cholesterol and B12 levels. She has been advised to abstain from simple sugars and alcohol for a period of 2 weeks. A weight loss of approximately 10 pounds is deemed beneficial for her height.    Follow-up  The patient is scheduled for a follow-up visit in 6 months to monitor her mood.            Discussed the  importance of maintaining a healthy weight and getting regular exercise.  Educated patient on the benefits of healthy diet.  Advise follow-up annually for wellness exams.    There are no Patient Instructions on file for this visit.    Patient or patient representative verbalized consent for the use of Ambient Listening during the visit with  Meredith Lea Kehrer, MD for chart documentation. 5/23/2024  14:52 EDT

## 2024-05-24 LAB
ALBUMIN SERPL-MCNC: 4.5 G/DL (ref 3.9–4.9)
ALBUMIN/GLOB SERPL: 1.8 {RATIO} (ref 1.2–2.2)
ALP SERPL-CCNC: 108 IU/L (ref 44–121)
ALT SERPL-CCNC: 12 IU/L (ref 0–32)
AST SERPL-CCNC: 15 IU/L (ref 0–40)
BASOPHILS # BLD AUTO: 0.1 X10E3/UL (ref 0–0.2)
BASOPHILS NFR BLD AUTO: 1 %
BILIRUB SERPL-MCNC: 0.5 MG/DL (ref 0–1.2)
BUN SERPL-MCNC: 19 MG/DL (ref 6–24)
BUN/CREAT SERPL: 20 (ref 9–23)
CALCIUM SERPL-MCNC: 9.7 MG/DL (ref 8.7–10.2)
CHLORIDE SERPL-SCNC: 99 MMOL/L (ref 96–106)
CHOLEST SERPL-MCNC: 247 MG/DL (ref 100–199)
CO2 SERPL-SCNC: 23 MMOL/L (ref 20–29)
CREAT SERPL-MCNC: 0.97 MG/DL (ref 0.57–1)
EGFRCR SERPLBLD CKD-EPI 2021: 74 ML/MIN/1.73
EOSINOPHIL # BLD AUTO: 0.1 X10E3/UL (ref 0–0.4)
EOSINOPHIL NFR BLD AUTO: 1 %
ERYTHROCYTE [DISTWIDTH] IN BLOOD BY AUTOMATED COUNT: 15.9 % (ref 11.7–15.4)
GLOBULIN SER CALC-MCNC: 2.5 G/DL (ref 1.5–4.5)
GLUCOSE SERPL-MCNC: 94 MG/DL (ref 70–99)
HCT VFR BLD AUTO: 35.6 % (ref 34–46.6)
HDLC SERPL-MCNC: 73 MG/DL
HGB BLD-MCNC: 11.1 G/DL (ref 11.1–15.9)
IMM GRANULOCYTES # BLD AUTO: 0 X10E3/UL (ref 0–0.1)
IMM GRANULOCYTES NFR BLD AUTO: 0 %
LDLC SERPL CALC-MCNC: 131 MG/DL (ref 0–99)
LYMPHOCYTES # BLD AUTO: 1.8 X10E3/UL (ref 0.7–3.1)
LYMPHOCYTES NFR BLD AUTO: 23 %
MCH RBC QN AUTO: 25.8 PG (ref 26.6–33)
MCHC RBC AUTO-ENTMCNC: 31.2 G/DL (ref 31.5–35.7)
MCV RBC AUTO: 83 FL (ref 79–97)
MONOCYTES # BLD AUTO: 0.6 X10E3/UL (ref 0.1–0.9)
MONOCYTES NFR BLD AUTO: 8 %
NEUTROPHILS # BLD AUTO: 5.2 X10E3/UL (ref 1.4–7)
NEUTROPHILS NFR BLD AUTO: 67 %
PLATELET # BLD AUTO: 446 X10E3/UL (ref 150–450)
POTASSIUM SERPL-SCNC: 4.5 MMOL/L (ref 3.5–5.2)
PROT SERPL-MCNC: 7 G/DL (ref 6–8.5)
RBC # BLD AUTO: 4.3 X10E6/UL (ref 3.77–5.28)
SODIUM SERPL-SCNC: 137 MMOL/L (ref 134–144)
TRIGL SERPL-MCNC: 246 MG/DL (ref 0–149)
TSH SERPL DL<=0.005 MIU/L-ACNC: 1.43 UIU/ML (ref 0.45–4.5)
VIT B12 SERPL-MCNC: 322 PG/ML (ref 232–1245)
VLDLC SERPL CALC-MCNC: 43 MG/DL (ref 5–40)
WBC # BLD AUTO: 7.8 X10E3/UL (ref 3.4–10.8)

## 2024-06-11 ENCOUNTER — OFFICE VISIT (OUTPATIENT)
Dept: ORTHOPEDIC SURGERY | Facility: CLINIC | Age: 44
End: 2024-06-11
Payer: COMMERCIAL

## 2024-06-11 DIAGNOSIS — M77.11 LATERAL EPICONDYLITIS OF RIGHT ELBOW: Primary | ICD-10-CM

## 2024-06-11 PROCEDURE — 99213 OFFICE O/P EST LOW 20 MIN: CPT | Performed by: NURSE PRACTITIONER

## 2024-06-11 NOTE — PROGRESS NOTES
Subjective:     Patient ID: Marleny Palma is a 44 y.o. female.    Chief Complaint:  Follow-up lateral epicondylitis- right   History of Present Illness  History of Present Illness    Marleny Palma Presents to clinic today for evaluation of her right elbow.  She received corticosteroid injection last visit with approximately 4 weeks symptom improvement.  Symptoms have since returned.  She is experiencing pain along the lateral epicondyle worse with gripping lifting pulling and with repetitive motion.  She has tried and failed to improve with tennis elbow strap and exercises.  Again localizes pain to the lateral epicondyle.  Denies any presence of numbness or tingling at the right upper extremity.  Denies any other concerns present.    Social History     Occupational History    Not on file   Tobacco Use    Smoking status: Never     Passive exposure: Never    Smokeless tobacco: Never   Vaping Use    Vaping status: Never Used   Substance and Sexual Activity    Alcohol use: Not Currently     Comment: Rarely.    Drug use: Never    Sexual activity: Yes     Partners: Male     Birth control/protection: Surgical     Comment: .      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    Colitis 10/25/2021    SEEN AT [facility]    Cystitis 04/2021    Depression     Diverticulitis 11/01/2021    ADMITTED TO [facility]    Diverticulosis     Dysuria 02/2021    Elevated blood pressure reading without diagnosis of hypertension 06/2019    Environmental allergies     Fatigue 08/2021    Geographic tongue     Hiatal hernia 03/2021    Hidradenitis axillaris     Hyperlipidemia     DIET CONTROL    Irritable bowel syndrome     Levator spasm 10/2015    LLQ pain 08/2014    Mallet deformity of right middle finger 05/08/2016     SEEN AT     Migraines     MRSA infection 2011    BILATERAL AXILLA AND GROIN WAS TREATED BY URGENT CARE IN [facility]    Muscle spasm of back 06/2019    Nasal fracture 2009    Nasal turbinate hypertrophy     Outlet dysfunction constipation     Panic attacks     Perineocele 10/2015    Rectocele 07/2014    Scoliosis     Sigmoid diverticulitis 01/11/2021    SEEN AT [facility]    Spinal headache     after epidural     Past Surgical History:   Procedure Laterality Date    ANTERIOR AND POSTERIOR VAGINAL REPAIR W/ SACROSPINOUS LIGAMENT SUSPENSION N/A 08/06/2014    DR. ROSELYN COLVIN AT [place]    AXILLARY HIDRADENITIS EXCISION Bilateral 11/16/2011    DR. MARBIN CARY AT  [place]    COLON RESECTION N/A 3/1/2022    Procedure: MOBILIZATION OF SPLENIC FLEXURE AND SIGMOID COLON RESECTION LAPAROSCOPIC WITH RebiotixINCI ROBOT;  Surgeon: Alejandro Johnston MD;  Location: [facility];  Service: Robotics - DaVinci;  Laterality: N/A;    ENDOSCOPY AND COLONOSCOPY N/A 03/03/2021    3 CM HIATAL HERNIA, SCATTERED DIVERTICULA IN ENTIRE COLON, SMALL INTERNAL HEMORRHOIDS, DR. YASHIRA SHRESTHA AT [facility]    HYSTERECTOMY N/A 08/06/2014    WITH RECTOCELE REPAIR, BSO, DR. ROSELYN COLVIN AT [place]    INCISION AND DRAINAGE ABSCESS  09/04/2013    ax[place]    TONSILLECTOMY Bilateral 08/10/2015    DR. LIBRA MANZANO AT [facility]    TRANSVAGINAL TAPING SUSPENSION N/A 08/06/2014    SLING, DR. ROSELYN COLVIN AT [place]    VAGINAL DELIVERY N/A 02/03/2003    DR. ASHLEY PRICE AT [facility]    VAGINAL DELIVERY N/A 05/13/2005    DR. AVRIL VOGEL AT [facility]    VAGINAL DELIVERY N/A 09/28/2007    DR. ASHLEY PRICE AT [facility]    WISDOM TOOTH EXTRACTION Bilateral        Family History   Problem Relation Age of Onset    Colon polyps Father     Hypertension Father     Arthritis Father     Hyperlipidemia Father     Arthritis Sister     Depression Sister     Hyperlipidemia Sister     Hypertension Sister     Depression Maternal Grandmother     Hypertension Maternal Grandmother      Kidney disease Maternal Grandmother     Heart attack Maternal Grandmother     Hypertension Maternal Grandfather     Cancer Maternal Grandfather     Skin cancer Maternal Grandfather     Prostate cancer Maternal Grandfather     Hypertension Paternal Grandmother     Scoliosis Paternal Grandmother     Thyroid disease Paternal Grandmother     Hypertension Paternal Grandfather     Stroke Paternal Grandfather     Diabetes Paternal Grandfather     Heart attack Paternal Grandfather     Malig Hyperthermia Neg Hx                Objective:  Physical Exam  General: No acute distress.  Eyes: conjunctiva clear; pupils equally round and reactive  ENT: external ears and nose atraumatic; oropharynx clear  CV: no peripheral edema  Resp: normal respiratory effort  Skin: no rashes or wounds; normal turgor  Psych: mood and affect appropriate; recent and remote memory intact    There were no vitals filed for this visit.  There were no vitals filed for this visit.  There is no height or weight on file to calculate BMI.      Ortho Exam     Physical Exam  Right upper extremity examined  Maximal tenderness present lateral epicondyle  Elbow extension 0 degrees flexion 130 degrees  Stable to stress testing  Negative Tinel's at cubital tunnel   strength 5 out of 5  Supination pronation strength 4+ out of 5    Assessment:        1. Lateral epicondylitis of right elbow         Assessment & Plan      Plan:  1.  Discussed with care with patient.  She does work for [employer] she is constantly grasping lifting, pulling and pushing with the right upper extremity as she does need this for her daily job.  Again does not receive the 3 months symptom improvement with the corticosteroid injections.  We did discuss proceeding with referral to [facility] for further evaluation of the right upper extremity.  Will plan to see her back in clinic as needed.  All questions answered.  Orders:  Orders Placed This Encounter   Procedures    Ambulatory Referral to  Hand Surgery     No orders of the defined types were placed in this encounter.    Dragon dictation utilized

## 2024-07-08 RX ORDER — CYCLOBENZAPRINE HCL 10 MG
10 TABLET ORAL 3 TIMES DAILY PRN
Qty: 45 TABLET | Refills: 2 | Status: SHIPPED | OUTPATIENT
Start: 2024-07-08

## 2024-07-08 RX ORDER — PANTOPRAZOLE SODIUM 40 MG/1
40 TABLET, DELAYED RELEASE ORAL DAILY
Qty: 90 TABLET | Refills: 1 | Status: SHIPPED | OUTPATIENT
Start: 2024-07-08

## 2024-09-10 ENCOUNTER — PATIENT MESSAGE (OUTPATIENT)
Dept: FAMILY MEDICINE CLINIC | Facility: CLINIC | Age: 44
End: 2024-09-10
Payer: COMMERCIAL

## 2024-09-10 RX ORDER — PANTOPRAZOLE SODIUM 40 MG/1
40 TABLET, DELAYED RELEASE ORAL DAILY
Qty: 90 TABLET | Refills: 1 | Status: SHIPPED | OUTPATIENT
Start: 2024-09-10 | End: 2024-09-11 | Stop reason: SDUPTHER

## 2024-09-10 NOTE — TELEPHONE ENCOUNTER
From: Marleny Palma  To: Meredith Kehrer  Sent: 9/10/2024 1:25 PM EDT  Subject: Protonix    I take protonix 40 mg twice daily. The prescription sent in in July was for once daily and I am out. Can you please send in another script to [facility] for twice a day? Thank you

## 2024-09-18 DIAGNOSIS — J30.1 SEASONAL ALLERGIC RHINITIS DUE TO POLLEN: ICD-10-CM

## 2024-09-18 RX ORDER — CHLORCYCLIZINE HYDROCHLORIDE AND PSEUDOEPHEDRINE HYDROCHLORIDE 25; 60 MG/1; MG/1
1 TABLET ORAL DAILY
Qty: 90 TABLET | Refills: 0 | Status: SHIPPED | OUTPATIENT
Start: 2024-09-18

## 2024-10-21 RX ORDER — CYCLOBENZAPRINE HCL 10 MG
10 TABLET ORAL 3 TIMES DAILY PRN
Qty: 45 TABLET | Refills: 2 | Status: SHIPPED | OUTPATIENT
Start: 2024-10-21

## 2024-10-21 NOTE — TELEPHONE ENCOUNTER
Rx Refill Note  Requested Prescriptions     Pending Prescriptions Disp Refills    cyclobenzaprine (FLEXERIL) 10 MG tablet [Pharmacy Med Name: cyclobenzaprine 10 mg tablet] 45 tablet 2     Sig: TAKE 1 TABLET BY MOUTH THREE TIMES DAILY AS NEEDED FOR MUSCLE SPASMS      Last office visit with prescribing clinician: 5/23/2024   Last telemedicine visit with prescribing clinician: Visit date not found   Next office visit with prescribing clinician: 11/20/2024                         Would you like a call back once the refill request has been completed: [] Yes [] No    If the office needs to give you a call back, can they leave a voicemail: [] Yes [] No    Brigida Vang MA  10/21/24, 07:12 EDT

## 2024-11-19 RX ORDER — CYCLOBENZAPRINE HCL 10 MG
10 TABLET ORAL 3 TIMES DAILY PRN
Qty: 45 TABLET | Refills: 2 | Status: SHIPPED | OUTPATIENT
Start: 2024-11-19

## 2024-11-19 NOTE — TELEPHONE ENCOUNTER
Rx Refill Note  Requested Prescriptions     Pending Prescriptions Disp Refills    cyclobenzaprine (FLEXERIL) 10 MG tablet 45 tablet 2     Sig: Take 1 tablet by mouth 3 (Three) Times a Day As Needed. for muscle spams      Last office visit with prescribing clinician: 5/23/2024   Last telemedicine visit with prescribing clinician: Visit date not found   Next office visit with prescribing clinician: 12/11/2024                         Would you like a call back once the refill request has been completed: [] Yes [] No    If the office needs to give you a call back, can they leave a voicemail: [] Yes [] No    Neetu Vasques MA  11/19/24, 09:53 EST

## 2024-11-29 DIAGNOSIS — F33.1 MODERATE EPISODE OF RECURRENT MAJOR DEPRESSIVE DISORDER: ICD-10-CM

## 2024-11-29 RX ORDER — VILAZODONE HYDROCHLORIDE 40 MG/1
40 TABLET ORAL DAILY
Qty: 90 TABLET | Refills: 1 | Status: SHIPPED | OUTPATIENT
Start: 2024-11-29

## 2024-11-29 NOTE — TELEPHONE ENCOUNTER
Rx Refill Note  Requested Prescriptions     Pending Prescriptions Disp Refills    vilazodone (VIIBRYD) 40 MG tablet tablet [Pharmacy Med Name: vilazodone 40 mg tablet] 90 tablet 1     Sig: TAKE 1 TABLET BY MOUTH EVERY DAY      Last office visit with prescribing clinician: 5/23/2024   Last telemedicine visit with prescribing clinician: Visit date not found   Next office visit with prescribing clinician: 12/11/2024                         Would you like a call back once the refill request has been completed: [] Yes [] No    If the office needs to give you a call back, can they leave a voicemail: [] Yes [] No    Neetu Murray MA  11/29/24, 09:22 EST

## 2024-12-11 ENCOUNTER — OFFICE VISIT (OUTPATIENT)
Dept: FAMILY MEDICINE CLINIC | Facility: CLINIC | Age: 44
End: 2024-12-11
Payer: COMMERCIAL

## 2024-12-11 VITALS
OXYGEN SATURATION: 99 % | SYSTOLIC BLOOD PRESSURE: 158 MMHG | WEIGHT: 171.2 LBS | TEMPERATURE: 98.3 F | BODY MASS INDEX: 27.51 KG/M2 | HEART RATE: 76 BPM | HEIGHT: 66 IN | DIASTOLIC BLOOD PRESSURE: 88 MMHG

## 2024-12-11 DIAGNOSIS — M77.11 RIGHT LATERAL EPICONDYLITIS: ICD-10-CM

## 2024-12-11 DIAGNOSIS — Z79.899 HIGH RISK MEDICATION USE: ICD-10-CM

## 2024-12-11 DIAGNOSIS — F33.1 MODERATE EPISODE OF RECURRENT MAJOR DEPRESSIVE DISORDER: Primary | ICD-10-CM

## 2024-12-11 PROCEDURE — 99214 OFFICE O/P EST MOD 30 MIN: CPT | Performed by: FAMILY MEDICINE

## 2024-12-11 RX ORDER — HYDROCODONE BITARTRATE AND ACETAMINOPHEN 5; 325 MG/1; MG/1
1 TABLET ORAL EVERY 6 HOURS PRN
Qty: 15 TABLET | Refills: 0 | Status: SHIPPED | OUTPATIENT
Start: 2024-12-11

## 2024-12-11 NOTE — PROGRESS NOTES
"Chief Complaint  Med Refill and Depression    Subjective        Marleny Palma presents to [facility]  History of Present Illness  History of Present Illness  The patient is a 44-year-old female who presents for a 6-month follow-up on her mood and for evaluation of right elbow pain.    She reports a positive response to Viibryd, with an overall improvement in her mood.  She is compliant with her medication and denies any SI HI or hopelessness.  She is rather frustrated with the problems she is having with her right elbow.    She continues to experience discomfort in her right elbow, which has been diagnosed with a 30 percent tendon tear. Despite the severity of her condition, she has not sought worker's compensation. She consulted with Dr. Robb, who administered the initial steroid injection, providing temporary relief for approximately 4 weeks. A subsequent injection was given in 09/2024. She was informed that only 3 injections are permissible throughout her lifetime. She was advised against the continued use of meloxicam due to its ineffectiveness in managing her pain. She has attempted to use a brace and has also tried topical treatments, both of which were unsuccessful. She is right-hand dominant and frequently engages in cooking activities. She is considering seeking a second opinion from another hand specialist. She was advised to avoid repetitive wrist movements, a recommendation she finds challenging due to her job requirements. She is unable to take tramadol and has experienced itching with Tylenol No. 3. She does not tolerate pain medications well but can take hydrocodone.    ALLERGIES  The patient is allergic to CODEINE.    MEDICATIONS  Viibryd, meloxicam       Objective   Vital Signs:  /88   Pulse 76   Temp 98.3 °F (36.8 °C)   Ht 167.6 cm (66\")   Wt 77.7 kg (171 lb 3.2 oz)   SpO2 99%   BMI 27.63 kg/m²   Estimated body mass index is 27.63 kg/m² as calculated from " "the following:    Height as of this encounter: 167.6 cm (66\").    Weight as of this encounter: 77.7 kg (171 lb 3.2 oz).               Physical Exam   Physical Exam  The patient is alert and shows no signs of acute distress.       Result Review :          Results  Imaging  MRI shows a 30% tear in the tendon of the right elbow.   MRI Elbow Right Without Contrast (04/26/2024 12:39)            Assessment and Plan     Diagnoses and all orders for this visit:    1. Moderate episode of recurrent major depressive disorder (Primary)    2. Right lateral epicondylitis  -     Ambulatory Referral to Hand Surgery  -     HYDROcodone-acetaminophen (NORCO) 5-325 MG per tablet; Take 1 tablet by mouth Every 6 (Six) Hours As Needed for Moderate Pain or Severe Pain.  Dispense: 15 tablet; Refill: 0    3. High risk medication use  -     Basic Metabolic Panel      Assessment & Plan  1. Chronic tendinopathy with superimposed partial thickness tear.  The patient has a 30% tear in the tendon of her right elbow, confirmed by MRI. She has received 2 steroid injections, which provided temporary relief. She is currently taking meloxicam but reports significant pain and limited effectiveness. She was advised to apply ice to the affected area 3 times daily for 20 minutes each session. A referral for a second opinion with Dr. Aviva Dobbins, a hand surgeon, was recommended. A prescription for 15 tablets of hydrocodone was provided for severe pain episodes. A basic metabolic panel will be ordered to monitor creatinine levels due to the ongoing use of meloxicam. If the second opinion does not yield satisfactory results, a referral to pain management will be considered.    2. Mood disorder.  The patient reports doing well on Viibryd with stable mood. She will continue her current medication regimen.    Follow-up  The patient will follow up in 6 months for a physical examination.    PROCEDURE  The patient received an initial steroid injection in 08/2023, " which provided temporary relief for approximately 4 weeks. A subsequent injection was administered in 09/2023.            Follow Up     Return in about 6 months (around 6/11/2025) for Annual physical.  Patient was given instructions and counseling regarding her condition or for health maintenance advice. Please see specific information pulled into the AVS if appropriate.    Patient or patient representative verbalized consent for the use of Ambient Listening during the visit with  Meredith Lea Kehrer, MD for chart documentation. 12/12/2024  13:46 EST

## 2024-12-12 LAB
BUN SERPL-MCNC: 18 MG/DL (ref 6–24)
BUN/CREAT SERPL: 23 (ref 9–23)
CALCIUM SERPL-MCNC: 9.5 MG/DL (ref 8.7–10.2)
CHLORIDE SERPL-SCNC: 100 MMOL/L (ref 96–106)
CO2 SERPL-SCNC: 21 MMOL/L (ref 20–29)
CREAT SERPL-MCNC: 0.77 MG/DL (ref 0.57–1)
EGFRCR SERPLBLD CKD-EPI 2021: 98 ML/MIN/1.73
GLUCOSE SERPL-MCNC: 100 MG/DL (ref 70–99)
POTASSIUM SERPL-SCNC: 4.2 MMOL/L (ref 3.5–5.2)
SODIUM SERPL-SCNC: 139 MMOL/L (ref 134–144)

## 2024-12-20 ENCOUNTER — PATIENT MESSAGE (OUTPATIENT)
Dept: FAMILY MEDICINE CLINIC | Facility: CLINIC | Age: 44
End: 2024-12-20
Payer: COMMERCIAL

## 2024-12-20 DIAGNOSIS — F33.1 MODERATE EPISODE OF RECURRENT MAJOR DEPRESSIVE DISORDER: ICD-10-CM

## 2024-12-20 DIAGNOSIS — J30.1 SEASONAL ALLERGIC RHINITIS DUE TO POLLEN: ICD-10-CM

## 2024-12-20 RX ORDER — VILAZODONE HYDROCHLORIDE 40 MG/1
40 TABLET ORAL DAILY
Qty: 90 TABLET | Refills: 1 | Status: SHIPPED | OUTPATIENT
Start: 2024-12-20

## 2024-12-20 RX ORDER — CHLORCYCLIZINE HYDROCHLORIDE AND PSEUDOEPHEDRINE HYDROCHLORIDE 25; 60 MG/1; MG/1
1 TABLET ORAL DAILY
Qty: 90 TABLET | Refills: 1 | Status: SHIPPED | OUTPATIENT
Start: 2024-12-20

## 2024-12-20 RX ORDER — MELOXICAM 15 MG/1
15 TABLET ORAL DAILY
Qty: 30 TABLET | Refills: 6 | Status: CANCELLED | OUTPATIENT
Start: 2024-12-20

## 2024-12-20 RX ORDER — MELOXICAM 15 MG/1
15 TABLET ORAL DAILY
Qty: 30 TABLET | Refills: 6 | Status: SHIPPED | OUTPATIENT
Start: 2024-12-20

## 2024-12-20 NOTE — TELEPHONE ENCOUNTER
Rx Refill Note  Requested Prescriptions     Pending Prescriptions Disp Refills    Chlorcyclizine-Pseudoephed (Stahist AD) 25-60 MG tablet 90 tablet 1     Sig: Take 1 tablet by mouth Daily.    vilazodone (VIIBRYD) 40 MG tablet tablet 90 tablet 1     Sig: Take 1 tablet by mouth Daily.      Last office visit with prescribing clinician: 12/11/2024   Last telemedicine visit with prescribing clinician: Visit date not found   Next office visit with prescribing clinician: 6/11/2025                         Would you like a call back once the refill request has been completed: [] Yes [] No    If the office needs to give you a call back, can they leave a voicemail: [] Yes [] No    Neetu Vasques MA  12/20/24, 11:43 EST

## 2024-12-20 NOTE — TELEPHONE ENCOUNTER
Rx Refill Note  Requested Prescriptions     Pending Prescriptions Disp Refills    meloxicam (MOBIC) 15 MG tablet 30 tablet 6     Sig: Take 1 tablet by mouth Daily.      Last office visit with prescribing clinician: 6/11/2024      Next office visit with prescribing clinician: Visit date not found   Last Filled: 4/16/2024    Lateral epicondylitis of right elbow     Chayito Wynn MA  12/20/24, 14:12 EST    Previous RX pended for your approval, change or denial.     {TIP  Encounters:    {TIP  Please add Last Relevant Lab Date if appropriate:  {TIP  Is Refill Pharmacy correct?:

## 2025-02-24 RX ORDER — CYCLOBENZAPRINE HCL 10 MG
10 TABLET ORAL 3 TIMES DAILY PRN
Qty: 45 TABLET | Refills: 2 | Status: SHIPPED | OUTPATIENT
Start: 2025-02-24

## 2025-02-24 NOTE — TELEPHONE ENCOUNTER
Rx Refill Note  Requested Prescriptions     Pending Prescriptions Disp Refills    cyclobenzaprine (FLEXERIL) 10 MG tablet [Pharmacy Med Name: cyclobenzaprine 10 mg tablet] 45 tablet 2     Sig: TAKE 1 TABLET BY MOUTH THREE TIMES DAILY AS NEEDED FOR MUSCLE SPASMS      Last office visit with prescribing clinician: 12/11/2024   Last telemedicine visit with prescribing clinician: Visit date not found   Next office visit with prescribing clinician: 6/11/2025                         Would you like a call back once the refill request has been completed: [] Yes [] No    If the office needs to give you a call back, can they leave a voicemail: [] Yes [] No    Neetu Vasques MA  02/24/25, 08:26 EST

## 2025-04-10 ENCOUNTER — OFFICE VISIT (OUTPATIENT)
Dept: FAMILY MEDICINE CLINIC | Facility: CLINIC | Age: 45
End: 2025-04-10
Payer: COMMERCIAL

## 2025-04-10 VITALS
SYSTOLIC BLOOD PRESSURE: 146 MMHG | HEART RATE: 102 BPM | WEIGHT: 165 LBS | BODY MASS INDEX: 26.52 KG/M2 | HEIGHT: 66 IN | DIASTOLIC BLOOD PRESSURE: 86 MMHG | OXYGEN SATURATION: 98 % | TEMPERATURE: 97.7 F

## 2025-04-10 DIAGNOSIS — J01.00 ACUTE NON-RECURRENT MAXILLARY SINUSITIS: ICD-10-CM

## 2025-04-10 DIAGNOSIS — R05.9 COUGH, UNSPECIFIED TYPE: Primary | ICD-10-CM

## 2025-04-10 LAB
EXPIRATION DATE: NORMAL
FLUAV AG UPPER RESP QL IA.RAPID: NOT DETECTED
FLUBV AG UPPER RESP QL IA.RAPID: NOT DETECTED
INTERNAL CONTROL: NORMAL
Lab: NORMAL
SARS-COV-2 AG UPPER RESP QL IA.RAPID: NOT DETECTED

## 2025-04-10 PROCEDURE — 87428 SARSCOV & INF VIR A&B AG IA: CPT | Performed by: FAMILY MEDICINE

## 2025-04-10 PROCEDURE — 99213 OFFICE O/P EST LOW 20 MIN: CPT | Performed by: FAMILY MEDICINE

## 2025-04-10 RX ORDER — DOXYCYCLINE 100 MG/1
100 CAPSULE ORAL 2 TIMES DAILY
Qty: 14 CAPSULE | Refills: 0 | Status: SHIPPED | OUTPATIENT
Start: 2025-04-10 | End: 2025-04-17

## 2025-04-10 NOTE — LETTER
April 10, 2025     Patient: Marleny Palma   YOB: 1980   Date of Visit: 4/10/2025       To Whom It May Concern:    It is my medical opinion that Marleny Palma may return to work in two days.         Sincerely,        Meredith Lea Kehrer, MD    CC: No Recipients

## 2025-04-10 NOTE — PROGRESS NOTES
"Chief Complaint  Cough, Chills, Generalized Body Aches, Loss Of Taste, and Fatigue    Subjective        Marleny Palma presents to [facility]  History of Present Illness  History of Present Illness  The patient is a 45-year-old female who presents for acute concerns.    She began experiencing a productive cough approximately 2 weeks ago, which was followed by the onset of chills, loss of taste, and body aches about a week later. Her symptoms initially improved but then worsened. Currently, she identifies fatigue and headaches as her most severe symptoms. She reports no known exposure to sick individuals. Her cough occasionally produces yellow sputum. She also reports sinus pain and pressure. Her cough does not disrupt her sleep. Her 17-year-old son has been experiencing a cough, but he has not sought medical attention.    Her blood pressure is high today and was high at her last visit. She is due for a physical in May.       Objective   Vital Signs:  /86   Pulse 102   Temp 97.7 °F (36.5 °C)   Ht 167.6 cm (66\")   Wt 74.8 kg (165 lb)   SpO2 98%   BMI 26.63 kg/m²   Estimated body mass index is 26.63 kg/m² as calculated from the following:    Height as of this encounter: 167.6 cm (66\").    Weight as of this encounter: 74.8 kg (165 lb).             Physical Exam   Physical Exam  The patient is alert and in no acute distress.  There is tenderness in the frontal sinuses. The throat is clear with some thick postnasal drainage. The ears are clear.  The lungs are clear on exam.    Vital Signs  The heart rate is 102. Blood pressure is high.       Result Review :          Results                Assessment and Plan     Diagnoses and all orders for this visit:    1. Cough, unspecified type (Primary)  -     POCT SARS-CoV-2 Antigen ABI + Flu      Assessment & Plan  1. Sinusitis.  She has been experiencing symptoms for 2 weeks, including a productive cough, chills, loss of taste, body aches, " fatigue, and headache. The cough initially improved but then worsened. Examination revealed tenderness in the frontal sinuses, thick postnasal drainage, and clear lungs. Given the duration and nature of symptoms, bacterial sinusitis is suspected. A one-week course of antibiotics will be initiated. She is advised to push fluids and get rest. If her condition deteriorates, she should inform the clinic.    2. Elevated blood pressure.  Her blood pressure is high today and was high at her last visit. She is due for a physical in May. She is advised to book her physical while here so her blood pressure can be checked when she is feeling well.            Follow Up     No follow-ups on file.  Patient was given instructions and counseling regarding her condition or for health maintenance advice. Please see specific information pulled into the AVS if appropriate.    Patient or patient representative verbalized consent for the use of Ambient Listening during the visit with  Meredith Lea Kehrer, MD for chart documentation. 4/10/2025  09:18 EDT

## 2025-04-11 DIAGNOSIS — F33.1 MODERATE EPISODE OF RECURRENT MAJOR DEPRESSIVE DISORDER: ICD-10-CM

## 2025-04-11 RX ORDER — VILAZODONE HYDROCHLORIDE 40 MG/1
40 TABLET ORAL DAILY
Qty: 90 TABLET | Refills: 1 | Status: SHIPPED | OUTPATIENT
Start: 2025-04-11

## 2025-04-30 ENCOUNTER — TELEPHONE (OUTPATIENT)
Dept: FAMILY MEDICINE CLINIC | Facility: CLINIC | Age: 45
End: 2025-04-30
Payer: COMMERCIAL

## 2025-04-30 DIAGNOSIS — K59.09 OTHER CONSTIPATION: Primary | ICD-10-CM

## 2025-04-30 RX ORDER — PANTOPRAZOLE SODIUM 40 MG/1
40 TABLET, DELAYED RELEASE ORAL 2 TIMES DAILY
Qty: 180 TABLET | Refills: 1 | Status: SHIPPED | OUTPATIENT
Start: 2025-04-30

## 2025-04-30 RX ORDER — PLECANATIDE 3 MG/1
1 TABLET ORAL DAILY
Qty: 30 TABLET | Refills: 2 | Status: SHIPPED | OUTPATIENT
Start: 2025-04-30 | End: 2025-05-01

## 2025-04-30 NOTE — TELEPHONE ENCOUNTER
Rx Refill Note  Requested Prescriptions     Pending Prescriptions Disp Refills    Trulance 3 MG tablet 30 tablet      Sig: Take 1 tablet by mouth Daily.    pantoprazole (PROTONIX) 40 MG EC tablet 180 tablet 1     Sig: Take 1 tablet by mouth 2 (Two) Times a Day.      Last office visit with prescribing clinician: 4/10/2025   Last telemedicine visit with prescribing clinician: Visit date not found   Next office visit with prescribing clinician: 6/11/2025                         Would you like a call back once the refill request has been completed: [] Yes [] No    If the office needs to give you a call back, can they leave a voicemail: [] Yes [] No    Neetu Vasques MA  04/30/25, 09:38 EDT

## 2025-04-30 NOTE — TELEPHONE ENCOUNTER
HAD TO CALL INSURANCE AT [phone] SPOKE TO [employer] FOR TRULANCE, IT IS NOT COVERED UNDER THE PLAN, WILL SEND FORMULARY EXCEPTION BUT IT DOES NOT LOOK LIKE PT HAS TRIED ALTERNATIVES IN CHART.   FYI

## 2025-05-19 RX ORDER — CYCLOBENZAPRINE HCL 10 MG
10 TABLET ORAL 3 TIMES DAILY PRN
Qty: 45 TABLET | Refills: 2 | Status: SHIPPED | OUTPATIENT
Start: 2025-05-19

## 2025-05-19 NOTE — TELEPHONE ENCOUNTER
Rx Refill Note  Requested Prescriptions     Pending Prescriptions Disp Refills    cyclobenzaprine (FLEXERIL) 10 MG tablet [Pharmacy Med Name: cyclobenzaprine 10 mg tablet] 45 tablet 2     Sig: TAKE 1 TABLET BY MOUTH THREE TIMES DAILY AS NEEDED FOR MUSCLE SPASMS      Last office visit with prescribing clinician: 4/10/2025   Last telemedicine visit with prescribing clinician: Visit date not found   Next office visit with prescribing clinician: 6/11/2025                         Would you like a call back once the refill request has been completed: [] Yes [] No    If the office needs to give you a call back, can they leave a voicemail: [] Yes [] No    Neetu Vasques MA  05/19/25, 07:47 EDT

## 2025-06-20 ENCOUNTER — OFFICE VISIT (OUTPATIENT)
Dept: FAMILY MEDICINE CLINIC | Facility: CLINIC | Age: 45
End: 2025-06-20
Payer: COMMERCIAL

## 2025-06-20 VITALS
WEIGHT: 165.2 LBS | HEIGHT: 66 IN | BODY MASS INDEX: 26.55 KG/M2 | OXYGEN SATURATION: 99 % | TEMPERATURE: 98.3 F | DIASTOLIC BLOOD PRESSURE: 98 MMHG | SYSTOLIC BLOOD PRESSURE: 144 MMHG | HEART RATE: 122 BPM

## 2025-06-20 DIAGNOSIS — K52.9 ACUTE COLITIS: Primary | ICD-10-CM

## 2025-06-20 DIAGNOSIS — D64.9 ANEMIA, UNSPECIFIED TYPE: ICD-10-CM

## 2025-06-20 DIAGNOSIS — N83.201 RIGHT OVARIAN CYST: ICD-10-CM

## 2025-06-20 DIAGNOSIS — E87.1 HYPONATREMIA: ICD-10-CM

## 2025-06-20 RX ORDER — METRONIDAZOLE 500 MG/1
500 TABLET ORAL 3 TIMES DAILY
Qty: 30 TABLET | Refills: 0 | Status: SHIPPED | OUTPATIENT
Start: 2025-06-20 | End: 2025-06-30

## 2025-06-20 RX ORDER — CIPROFLOXACIN 500 MG/1
500 TABLET, FILM COATED ORAL 2 TIMES DAILY
Qty: 20 TABLET | Refills: 0 | Status: SHIPPED | OUTPATIENT
Start: 2025-06-20 | End: 2025-06-30

## 2025-06-20 RX ORDER — HYDROCODONE BITARTRATE AND ACETAMINOPHEN 5; 325 MG/1; MG/1
1 TABLET ORAL
COMMUNITY
Start: 2025-06-18 | End: 2025-06-20 | Stop reason: SDUPTHER

## 2025-06-20 RX ORDER — HYDROCODONE BITARTRATE AND ACETAMINOPHEN 5; 325 MG/1; MG/1
1 TABLET ORAL
Qty: 20 TABLET | Refills: 0 | Status: SHIPPED | OUTPATIENT
Start: 2025-06-20

## 2025-06-20 NOTE — PROGRESS NOTES
"Chief Complaint  Hospital Follow Up Visit, Ovarian Cyst, and cholitis    Subjective        Marleny Palma presents to [facility]  History of Present Illness  History of Present Illness  The patient is a 45-year-old female who presents for an ER follow-up.    She experienced a sudden onset of left-sided abdominal pressure while at work on Saturday. This was accompanied by constipation, which escalated to severe pain, impairing her mobility and causing significant discomfort. A CT scan performed on Tuesday revealed colitis. She was prescribed Augmentin and analgesics but reports persistent pain. She has been taking hydrocodone every 4 hours and requests a refill. She reports no fever or worsening pain. Her appetite has been poor, and she has been forcing herself to eat. She has been having small, soft bowel movements and has not noticed any bleeding or black tarry stools. She also reports elevated protein levels in her urine. She is scheduled to return to work on Monday. She has been on Linzess, which she takes intermittently due to its side effect of diarrhea. She requires 3 tablets to facilitate bowel movements. She recalls a previous episode where Flagyl and Cipro were more effective, with symptom relief within 2 days.    She has a history of hysterectomy and is under the care of an OB/GYN. She is open to seeing another specialist. She has a family history of ovarian cysts, with her mother having had a 5-pound cyst that necessitated a hysterectomy.    She has not been supplementing with potassium. She had a challenging week due to the heat.    She is currently on Viibryd, which she tolerates well.    PAST SURGICAL HISTORY:  - Hysterectomy  - Partial resection for recurrent diverticulitis    FAMILY HISTORY  Her mother had a 5-pound ovarian cyst and underwent a hysterectomy.       Objective   Vital Signs:  /98   Pulse (!) 122   Temp 98.3 °F (36.8 °C)   Ht 167.6 cm (66\")   Wt " "74.9 kg (165 lb 3.2 oz)   SpO2 99%   BMI 26.66 kg/m²   Estimated body mass index is 26.66 kg/m² as calculated from the following:    Height as of this encounter: 167.6 cm (66\").    Weight as of this encounter: 74.9 kg (165 lb 3.2 oz).             Physical Exam   Physical Exam  Cardiovascular: Regular rate and rhythm  Gastrointestinal: Mildly distended abdomen, normal bowel sounds, mild diffuse lower abdominal tenderness without guarding or rebound       Result Review :          Results  Labs   - Comprehensive Metabolic Panel (CMP): Potassium slightly low   - Complete Blood Count (CBC): White count normal, Anemia detected   - Urinalysis: Elevated protein levels    Imaging   - CT scan of the abdomen: Thickening along the sigmoid colon, proximal and distal to the anastomosis concerning for acute colitis   - Ultrasound: 4 cm right ovarian cyst              Assessment and Plan     Diagnoses and all orders for this visit:    1. Acute colitis (Primary)  Comments:  sigmoid  Orders:  -     Ambulatory Referral to Colorectal Surgery  -     ciprofloxacin (Cipro) 500 MG tablet; Take 1 tablet by mouth 2 (Two) Times a Day for 10 days.  Dispense: 20 tablet; Refill: 0  -     metroNIDAZOLE (FLAGYL) 500 MG tablet; Take 1 tablet by mouth 3 (Three) Times a Day for 10 days.  Dispense: 30 tablet; Refill: 0  -     CBC & Differential  -     Comprehensive Metabolic Panel  -     HYDROcodone-acetaminophen (NORCO) 5-325 MG per tablet; Take 1 tablet by mouth every 6 (six) to 8 (eight) hours as needed for Moderate Pain or Severe Pain.  Dispense: 20 tablet; Refill: 0    2. Right ovarian cyst  -     Ambulatory Referral to Obstetrics / Gynecology    3. Anemia, unspecified type  -     CBC & Differential  -     Ferritin  -     Iron  -     Vitamin B12  -     Folate    4. Hyponatremia  -     Comprehensive Metabolic Panel      Assessment & Plan  1. Acute colitis.  - Presents with acute colitis, unusual given history of partial resection for recurrent " diverticulitis.  - CT scan showed thickening along the sigmoid colon, proximal and distal to the anastomosis, concerning for acute colitis.  - Referred to Dr. Johnston for further evaluation and potential colonoscopy once condition stabilizes.  - Antibiotic regimen switched from Augmentin to Cipro and Flagyl; advised to maintain a clear liquid diet until symptoms improve; prescription for hydrocodone, limited to 20 pills, provided.    2. Right ovarian cyst.  - 4 cm right ovarian cyst noted incidentally on CT scan.  - Not causing pain but should be monitored.  - Referral to OB/GYN for further evaluation and follow-up.    3. Anemia.  - Significantly anemic, requiring further investigation.  - Labs ordered today to determine cause of anemia.    4. Medication management.  - Mood medication, Viibryd, will be refilled as patient reports it is working well.            Follow Up     No follow-ups on file.  Patient was given instructions and counseling regarding her condition or for health maintenance advice. Please see specific information pulled into the AVS if appropriate.    Patient or patient representative verbalized consent for the use of Ambient Listening during the visit with  Meredith Lea Kehrer, MD for chart documentation. 6/20/2025  11:39 EDT

## 2025-06-21 LAB
ALBUMIN SERPL-MCNC: 4.5 G/DL (ref 3.9–4.9)
ALP SERPL-CCNC: 99 IU/L (ref 44–121)
ALT SERPL-CCNC: 19 IU/L (ref 0–32)
AST SERPL-CCNC: 25 IU/L (ref 0–40)
BASOPHILS # BLD AUTO: 0.1 X10E3/UL (ref 0–0.2)
BASOPHILS NFR BLD AUTO: 1 %
BILIRUB SERPL-MCNC: 0.4 MG/DL (ref 0–1.2)
BUN SERPL-MCNC: 11 MG/DL (ref 6–24)
BUN/CREAT SERPL: 15 (ref 9–23)
CALCIUM SERPL-MCNC: 9.8 MG/DL (ref 8.7–10.2)
CHLORIDE SERPL-SCNC: 97 MMOL/L (ref 96–106)
CO2 SERPL-SCNC: 17 MMOL/L (ref 20–29)
CREAT SERPL-MCNC: 0.75 MG/DL (ref 0.57–1)
EGFRCR SERPLBLD CKD-EPI 2021: 101 ML/MIN/1.73
EOSINOPHIL # BLD AUTO: 0.1 X10E3/UL (ref 0–0.4)
EOSINOPHIL NFR BLD AUTO: 2 %
ERYTHROCYTE [DISTWIDTH] IN BLOOD BY AUTOMATED COUNT: 16.4 % (ref 11.7–15.4)
FERRITIN SERPL-MCNC: 17 NG/ML (ref 15–150)
FOLATE SERPL-MCNC: 9.9 NG/ML
GLOBULIN SER CALC-MCNC: 2.7 G/DL (ref 1.5–4.5)
GLUCOSE SERPL-MCNC: 81 MG/DL (ref 70–99)
HCT VFR BLD AUTO: 34.3 % (ref 34–46.6)
HGB BLD-MCNC: 10 G/DL (ref 11.1–15.9)
IMM GRANULOCYTES # BLD AUTO: 0 X10E3/UL (ref 0–0.1)
IMM GRANULOCYTES NFR BLD AUTO: 0 %
IRON SERPL-MCNC: 25 UG/DL (ref 27–159)
LYMPHOCYTES # BLD AUTO: 1 X10E3/UL (ref 0.7–3.1)
LYMPHOCYTES NFR BLD AUTO: 14 %
MCH RBC QN AUTO: 23.6 PG (ref 26.6–33)
MCHC RBC AUTO-ENTMCNC: 29.2 G/DL (ref 31.5–35.7)
MCV RBC AUTO: 81 FL (ref 79–97)
MONOCYTES # BLD AUTO: 0.6 X10E3/UL (ref 0.1–0.9)
MONOCYTES NFR BLD AUTO: 8 %
NEUTROPHILS # BLD AUTO: 5.4 X10E3/UL (ref 1.4–7)
NEUTROPHILS NFR BLD AUTO: 75 %
PLATELET # BLD AUTO: 688 X10E3/UL (ref 150–450)
POTASSIUM SERPL-SCNC: 4.4 MMOL/L (ref 3.5–5.2)
PROT SERPL-MCNC: 7.2 G/DL (ref 6–8.5)
RBC # BLD AUTO: 4.24 X10E6/UL (ref 3.77–5.28)
SODIUM SERPL-SCNC: 138 MMOL/L (ref 134–144)
VIT B12 SERPL-MCNC: 1026 PG/ML (ref 232–1245)
WBC # BLD AUTO: 7.1 X10E3/UL (ref 3.4–10.8)

## 2025-07-21 RX ORDER — MELOXICAM 15 MG/1
15 TABLET ORAL DAILY
Qty: 90 TABLET | Refills: 1 | Status: SHIPPED | OUTPATIENT
Start: 2025-07-21

## 2025-07-21 NOTE — TELEPHONE ENCOUNTER
Rx Refill Note  Requested Prescriptions     Pending Prescriptions Disp Refills    meloxicam (MOBIC) 15 MG tablet [Pharmacy Med Name: meloxicam 15 mg tablet] 90 tablet 1     Sig: TAKE 1 TABLET BY MOUTH ONCE DAILY      Last office visit with prescribing clinician: 6/20/2025   Last telemedicine visit with prescribing clinician: Visit date not found   Next office visit with prescribing clinician: Visit date not found                         Would you like a call back once the refill request has been completed: [] Yes [] No    If the office needs to give you a call back, can they leave a voicemail: [] Yes [] No    Neetu Vasques MA  07/21/25, 08:25 EDT

## 2025-07-28 ENCOUNTER — OFFICE VISIT (OUTPATIENT)
Dept: SURGERY | Facility: CLINIC | Age: 45
End: 2025-07-28
Payer: COMMERCIAL

## 2025-07-28 VITALS
SYSTOLIC BLOOD PRESSURE: 142 MMHG | HEART RATE: 100 BPM | HEIGHT: 66 IN | BODY MASS INDEX: 25.88 KG/M2 | OXYGEN SATURATION: 100 % | WEIGHT: 161 LBS | DIASTOLIC BLOOD PRESSURE: 102 MMHG

## 2025-07-28 DIAGNOSIS — K59.00 CONSTIPATION, UNSPECIFIED CONSTIPATION TYPE: ICD-10-CM

## 2025-07-28 DIAGNOSIS — K57.92 DIVERTICULITIS: Primary | ICD-10-CM

## 2025-07-28 NOTE — PROGRESS NOTES
Marleny Palma is a 45 y.o. female who is seen as a consult at the request of Meredith Lea Kehrer, MD for Consult.      HPI:  History of Present Illness  The patient is a 45-year-old female who underwent a sigmoid resection and mobilization of the splenic flexure for diverticulitis in 2022. She had a CT scan done in [place] on 06/18/2025, which showed postsurgical abnormal wall thickening and adjacent inflammation above and below the anastomosis, as well as a significant stool burden. Her specimen from 2022 revealed diverticulosis with chronic diverticulitis, with both the proximal and distal margins showing benign colonic tissue. She was started on antibiotics for the diverticulitis. A colonoscopy in 2021 revealed diverticulosis in the sigmoid, descending, and ascending colon. She has no GERD.    She sought emergency care due to familiar pain, despite feeling well overall. She had gained weight and was not frequently ill but began experiencing constipation. Her gastroenterologist prescribed Trulance 1 mg, which she could not take daily due to diarrhea. She used it as needed, which helped regulate her bowel movements. However, a change in insurance led to a switch to Linzess 145 mcg, which was ineffective. She experienced constipation during a particularly hard week at work, exacerbated by heat exposure and dehydration. This led to severe pain and difficulty walking, prompting an ER visit where a CT scan was performed. She was prescribed Augmentin and pain medication but also has a right-sided ovarian cyst. After four doses of Augmentin without improvement, she consulted Dr. Talley, who prescribed Cipro and Flagyl, resulting in a complete turnaround within 24 hours. She is unsure why the issue recurred. She still has some Linzess left and is considering taking it at night to avoid diarrhea. It took 2 to 3 doses to work, unlike Trulance, which worked within 30 minutes. She has never taken Linzess regularly due to fear  of side effects. She does not take fiber supplements like Metamucil or Citrucel. She is interested in knowing how much water she should drink daily and whether alcohol consumption is permissible. Her employer requires paperwork due to her chronic condition, as she has missed five days of work and may need to take more time off due to flare-ups.    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    Colitis 10/25/2021    SEEN AT [facility] ER    Cystitis 04/2021    Depression     Diverticulitis 11/01/2021    ADMITTED TO [facility]    Diverticulosis     Dysuria 02/2021    Elevated blood pressure reading without diagnosis of hypertension 06/2019    Environmental allergies     Fatigue 08/2021    Geographic tongue     Hiatal hernia 03/2021    Hidradenitis axillaris     Hyperlipidemia     DIET CONTROL    Irritable bowel syndrome     Levator spasm 10/2015    LLQ pain 08/2014    Mallet deformity of right middle finger 05/08/2016    SEEN AT     Migraines     MRSA infection 2011    BILATERAL AXILLA AND GROIN WAS TREATED BY URGENT CARE IN [facility]    Muscle spasm of back 06/2019    Nasal fracture 2009    Nasal turbinate hypertrophy     Outlet dysfunction constipation     Panic attacks     Perineocele 10/2015    Rectocele 07/2014    Scoliosis     Sigmoid diverticulitis 01/11/2021    SEEN AT [facility]    Spinal headache     after epidural       Past Surgical History:   Procedure Laterality Date    ANTERIOR AND POSTERIOR VAGINAL REPAIR W/ SACROSPINOUS LIGAMENT SUSPENSION N/A 08/06/2014    DR. ROSELYN COLVIN AT [place]    AXILLARY HIDRADENITIS EXCISION Bilateral 11/16/2011    DR. MARBIN CARY AT  [place]    COLON RESECTION N/A  3/1/2022    Procedure: MOBILIZATION OF SPLENIC FLEXURE AND SIGMOID COLON RESECTION LAPAROSCOPIC WITH DAVINCI ROBOT;  Surgeon: Alejandro Johnston MD;  Location: [facility];  Service: Robotics - DaVinci;  Laterality: N/A;    ENDOSCOPY AND COLONOSCOPY N/A 03/03/2021    3 CM HIATAL HERNIA, SCATTERED DIVERTICULA IN ENTIRE COLON, SMALL INTERNAL HEMORRHOIDS, DR. YASHIRA SHRESTHA AT [place] [facility]    HYSTERECTOMY N/A 08/06/2014    WITH RECTOCELE REPAIR, BSO, DR. ROSELYN COLVIN AT [place]    INCISION AND DRAINAGE ABSCESS  09/04/2013    axilla    TONSILLECTOMY Bilateral 08/10/2015    DR. LIBRA MANZANO AT [facility]    TRANSVAGINAL TAPING SUSPENSION N/A 08/06/2014    SLING, DR. ROSELYN COLVIN AT [place]    VAGINAL DELIVERY N/A 02/03/2003    DR. ASHLEY PRICE AT [facility]    VAGINAL DELIVERY N/A 05/13/2005    DR. AVRIL VOGEL AT [facility]    VAGINAL DELIVERY N/A 09/28/2007    DR. ASHLEY PRICE AT [facility]    WISDOM TOOTH EXTRACTION Bilateral        Social History:   reports that she has never smoked. She has never been exposed to tobacco smoke. She has never used smokeless tobacco. She reports that she does not currently use alcohol. She reports that she does not use drugs.      Marriage status:     Family History   Problem Relation Age of Onset    Colon polyps Father     Hypertension Father     Arthritis Father     Hyperlipidemia Father     Arthritis Sister     Depression Sister     Hyperlipidemia Sister     Hypertension Sister     Depression Maternal Grandmother     Hypertension Maternal Grandmother     Kidney disease Maternal Grandmother     Heart attack Maternal Grandmother     Hypertension Maternal Grandfather     Cancer Maternal Grandfather     Skin cancer Maternal Grandfather     Prostate cancer Maternal Grandfather     Hypertension Paternal Grandmother     Scoliosis Paternal Grandmother     Thyroid disease Paternal Grandmother     Hypertension Paternal Grandfather     Stroke Paternal Grandfather     Diabetes Paternal Grandfather     Heart attack  Paternal Grandfather     Malig Hyperthermia Neg Hx          Current Outpatient Medications:     acetaminophen (TYLENOL) 325 MG tablet, Take 2 tablets by mouth Every 4 (Four) Hours As Needed., Disp: , Rfl:     Chlorcyclizine-Pseudoephed (Stahist AD) 25-60 MG tablet, Take 1 tablet by mouth Daily., Disp: 90 tablet, Rfl: 1    cyclobenzaprine (FLEXERIL) 10 MG tablet, TAKE 1 TABLET BY MOUTH THREE TIMES DAILY AS NEEDED FOR MUSCLE SPASMS, Disp: 45 tablet, Rfl: 2    meloxicam (MOBIC) 15 MG tablet, TAKE 1 TABLET BY MOUTH ONCE DAILY, Disp: 90 tablet, Rfl: 1    pantoprazole (PROTONIX) 40 MG EC tablet, Take 1 tablet by mouth 2 (Two) Times a Day., Disp: 180 tablet, Rfl: 1    vilazodone (VIIBRYD) 40 MG tablet tablet, TAKE 1 TABLET BY MOUTH ONCE DAILY, Disp: 90 tablet, Rfl: 1    Allergy  Ceftin [cefuroxime axetil], Vancomycin, Cefuroxime, Oxycodone, and Oxycodone-acetaminophen      Vitals:    07/28/25 1335   BP: (!) 142/102   Pulse: 100   SpO2: 100%     Body mass index is 25.99 kg/m².      Physical Exam    Physical Exam  Constitutional:       General: She is not in acute distress.     Appearance: She is well-developed.   HENT:      Head: Normocephalic and atraumatic.   Abdominal:      General: There is no distension.      Palpations: Abdomen is soft.   Musculoskeletal:         General: Normal range of motion.   Neurological:      Mental Status: She is alert.   Psychiatric:         Thought Content: Thought content normal.         Review of Medical Record:  I reviewed medical records as detailed in HPI.     Assessment & Plan  1. Diverticulitis.  She has a history of diverticulitis and recently experienced a flare-up, which was managed with antibiotics. A CT scan on 06/18/2025 showed postsurgical abnormal wall thickening and adjacent inflammation above and below the anastomosis, with a significant stool burden. She was previously on Trulance 1 mg but experienced diarrhea. Linzess 145 mcg was tried but was ineffective. She is advised  to increase her fiber intake to around 30 g daily, focusing on dark greens, whole grains, nuts, and berries. FiberCon, 1 to 2 pills daily, is recommended. Adequate hydration is emphasized, aiming for light yellow urine. The dosage of Linzess will be increased to 290 mcg daily, which can be taken at night if preferred. Over-the-counter magnesium gluconate or oxide 400 mg tablets are suggested for nightly use to aid sleep, alleviate muscle cramps, and act as a natural cathartic. If the increased dose of Linzess causes diarrhea, she can reduce it back to 145 mcg.    Follow-up  A follow-up visit is scheduled in 6 to 8 weeks.     1. Diverticulitis    2. Constipation, unspecified constipation type          Patient or patient representative verbalized consent for the use of Ambient Listening during the visit with  Alejandro Johnston MD for chart documentation. 8/1/2025  14:18 EDT

## 2025-08-13 RX ORDER — MELOXICAM 15 MG/1
15 TABLET ORAL DAILY
Qty: 90 TABLET | Refills: 1 | Status: SHIPPED | OUTPATIENT
Start: 2025-08-13

## 2025-08-18 RX ORDER — CYCLOBENZAPRINE HCL 10 MG
10 TABLET ORAL 3 TIMES DAILY PRN
Qty: 45 TABLET | Refills: 2 | Status: SHIPPED | OUTPATIENT
Start: 2025-08-18

## (undated) DEVICE — TP UMB COTN 1/8X36 U12T

## (undated) DEVICE — SUCTION IRRIGATOR: Brand: ENDOWRIST

## (undated) DEVICE — REDUCER: Brand: ENDOWRIST

## (undated) DEVICE — ARM DRAPE

## (undated) DEVICE — STERILE LATEX POWDER-FREE SURGICAL GLOVESWITH NITRILE COATING: Brand: PROTEXIS

## (undated) DEVICE — TIP COVER ACCESSORY

## (undated) DEVICE — TTL1LYR 16FR10ML 100%SIL TMPST TR: Brand: MEDLINE

## (undated) DEVICE — APPL HEMOS FIBRIN DUPLOTIP W/SNPLK 5MM 32CM

## (undated) DEVICE — TUBING, SUCTION, 1/4" X 20', STRAIGHT: Brand: MEDLINE INDUSTRIES, INC.

## (undated) DEVICE — LOU GENERAL ROBOT: Brand: MEDLINE INDUSTRIES, INC.

## (undated) DEVICE — GOWN,NON-REINFORCED,SIRUS,SET IN SLV,XL: Brand: MEDLINE

## (undated) DEVICE — SOL NACL 0.9PCT 1000ML

## (undated) DEVICE — CANNULA SEAL

## (undated) DEVICE — SUT VIC 0 TIES 18IN J912G

## (undated) DEVICE — DISPOSABLE GRASPER CARTRIDGE: Brand: DIRECT DRIVE REPOSABLE GRASPERS

## (undated) DEVICE — COVER,MAYO STAND,STERILE: Brand: MEDLINE

## (undated) DEVICE — ANTIBACTERIAL UNDYED BRAIDED (POLYGLACTIN 910), SYNTHETIC ABSORBABLE SUTURE: Brand: COATED VICRYL

## (undated) DEVICE — SOL ANTISTICK CAUTRY ELECTROLUBE LF

## (undated) DEVICE — ACCESS PLATFORM FOR MINIMALLY INVASIVE SURGERY: Brand: GELPOINT®  MINI ADVANCED ACCESS PLATFORM

## (undated) DEVICE — VISUALIZATION SYSTEM: Brand: CLEARIFY

## (undated) DEVICE — LEGGINGS, PAIR, CLEAR, STERILE: Brand: MEDLINE

## (undated) DEVICE — 3M™ IOBAN™ 2 ANTIMICROBIAL INCISE DRAPE 6648EZ: Brand: IOBAN™ 2

## (undated) DEVICE — DEV COND GAS LAP INSUFLOW W/LUER CONN

## (undated) DEVICE — DRP SURG UTIL W/TPE 2/LAYR 15X26IN DISP

## (undated) DEVICE — PATIENT RETURN ELECTRODE, SINGLE-USE, CONTACT QUALITY MONITORING, ADULT, WITH 9FT CORD, FOR PATIENTS WEIGING OVER 33LBS. (15KG): Brand: MEGADYNE

## (undated) DEVICE — STAPLER 60: Brand: SUREFORM

## (undated) DEVICE — SYNCHROSEAL: Brand: DA VINCI ENERGY

## (undated) DEVICE — LAPAROSCOPIC SMOKE FILTRATION SYSTEM: Brand: PALL LAPAROSHIELD® PLUS LAPAROSCOPIC SMOKE FILTRATION SYSTEM

## (undated) DEVICE — SUT MNCRYL PLS ANTIB UD 4/0 PS2 18IN

## (undated) DEVICE — SPNG LAP 18X18IN LF STRL PK/5

## (undated) DEVICE — GOWN SURG AERO CHROME XL

## (undated) DEVICE — GOWN,SIRUS,NON REINFRCD,LARGE,SET IN SL: Brand: MEDLINE

## (undated) DEVICE — BANDAGE,GAUZE,BULKEE II,4.5"X4.1YD,STRL: Brand: MEDLINE

## (undated) DEVICE — GLV SURG SENSICARE PI LF PF 7.5 GRN STRL

## (undated) DEVICE — 1000ML,PRESSURE INFUSER W/STOPCOCK: Brand: MEDLINE

## (undated) DEVICE — SUT VIC 2/0 UR6 27IN J602H

## (undated) DEVICE — SEAL

## (undated) DEVICE — VIOLET BRAIDED (POLYGLACTIN 910), SYNTHETIC ABSORBABLE SUTURE: Brand: COATED VICRYL

## (undated) DEVICE — MEDI-VAC YANKAUER SUCTION HANDLE W/BULBOUS TIP: Brand: CARDINAL HEALTH

## (undated) DEVICE — PENCL ES MEGADINE EZ/CLEAN BUTN W/HOLSTR 10FT

## (undated) DEVICE — 3M™ STERI-DRAPE™ INSTRUMENT POUCH 1018L: Brand: STERI-DRAPE™

## (undated) DEVICE — ENDOPATH XCEL BLADELESS TROCARS WITH STABILITY SLEEVES: Brand: ENDOPATH XCEL

## (undated) DEVICE — BLADELESS OBTURATOR: Brand: WECK VISTA

## (undated) DEVICE — COLUMN DRAPE

## (undated) DEVICE — IRRIGATOR BULB ASEPTO 60CC STRL